# Patient Record
Sex: MALE | Race: WHITE | NOT HISPANIC OR LATINO | Employment: FULL TIME | URBAN - METROPOLITAN AREA
[De-identification: names, ages, dates, MRNs, and addresses within clinical notes are randomized per-mention and may not be internally consistent; named-entity substitution may affect disease eponyms.]

---

## 2017-04-13 ENCOUNTER — OFFICE VISIT (OUTPATIENT)
Dept: INTERNAL MEDICINE | Facility: CLINIC | Age: 37
End: 2017-04-13
Payer: COMMERCIAL

## 2017-04-13 VITALS
WEIGHT: 239.88 LBS | BODY MASS INDEX: 30.79 KG/M2 | HEART RATE: 95 BPM | HEIGHT: 74 IN | TEMPERATURE: 100 F | DIASTOLIC BLOOD PRESSURE: 70 MMHG | RESPIRATION RATE: 16 BRPM | SYSTOLIC BLOOD PRESSURE: 126 MMHG

## 2017-04-13 DIAGNOSIS — F41.1 GAD (GENERALIZED ANXIETY DISORDER): ICD-10-CM

## 2017-04-13 DIAGNOSIS — R41.840 CONCENTRATION DEFICIT: ICD-10-CM

## 2017-04-13 DIAGNOSIS — M51.36 BULGE OF LUMBAR DISC WITHOUT MYELOPATHY: ICD-10-CM

## 2017-04-13 DIAGNOSIS — Z79.4 TYPE 2 DIABETES MELLITUS WITHOUT COMPLICATION, WITH LONG-TERM CURRENT USE OF INSULIN: ICD-10-CM

## 2017-04-13 DIAGNOSIS — E11.9 TYPE 2 DIABETES MELLITUS WITHOUT COMPLICATION, WITH LONG-TERM CURRENT USE OF INSULIN: ICD-10-CM

## 2017-04-13 DIAGNOSIS — Z00.00 ANNUAL PHYSICAL EXAM: Primary | ICD-10-CM

## 2017-04-13 DIAGNOSIS — M25.861 PATELLOFEMORAL DYSFUNCTION OF RIGHT KNEE: ICD-10-CM

## 2017-04-13 PROCEDURE — 99999 PR PBB SHADOW E&M-NEW PATIENT-LVL III: CPT | Mod: PBBFAC,,, | Performed by: INTERNAL MEDICINE

## 2017-04-13 PROCEDURE — 99385 PREV VISIT NEW AGE 18-39: CPT | Mod: S$GLB,,, | Performed by: INTERNAL MEDICINE

## 2017-04-13 RX ORDER — METFORMIN HYDROCHLORIDE 500 MG/1
500 TABLET, EXTENDED RELEASE ORAL
COMMUNITY
End: 2019-03-19 | Stop reason: SDUPTHER

## 2017-04-13 RX ORDER — INSULIN GLARGINE 300 [IU]/ML
40 INJECTION, SOLUTION SUBCUTANEOUS NIGHTLY
COMMUNITY
End: 2017-10-23

## 2017-04-13 RX ORDER — INSULIN LISPRO 100 [IU]/ML
15 INJECTION, SOLUTION INTRAVENOUS; SUBCUTANEOUS
COMMUNITY
End: 2018-01-08 | Stop reason: DRUGHIGH

## 2017-04-13 RX ORDER — BUPROPION HYDROCHLORIDE 150 MG/1
150 TABLET ORAL DAILY
Qty: 30 TABLET | Refills: 11 | Status: SHIPPED | OUTPATIENT
Start: 2017-04-13 | End: 2017-10-23

## 2017-04-13 RX ORDER — BACLOFEN 20 MG/1
20 TABLET ORAL DAILY PRN
Qty: 30 TABLET | Refills: 11 | Status: SHIPPED | OUTPATIENT
Start: 2017-04-13 | End: 2019-02-11 | Stop reason: SDUPTHER

## 2017-04-13 RX ORDER — ETODOLAC 200 MG/1
200 CAPSULE ORAL DAILY PRN
Qty: 30 CAPSULE | Refills: 11 | Status: SHIPPED | OUTPATIENT
Start: 2017-04-13 | End: 2019-02-11 | Stop reason: SDUPTHER

## 2017-04-13 NOTE — PROGRESS NOTES
Subjective:       Patient ID: Chip Arora is a 36 y.o. male.    Chief Complaint: Establish Care    Patient is a 36 y.o.male who presents today for annual. He is a type 2 diabetes and every six months he visits with endocrine.       -Cholesterol: (due now)  -Vaccines: Influenza (done); Tetanus (due in three years); declines pneumonia vaccine for now  -Exercise: yes, walks dog for one hour 2-3 weeks; lots of stretching; push ups; weight lifting  -Diet: diabetic diet; low carb and low fat    MARIAELENA/ depression: affects blood sugar a lot; anxiety seems to be causing the depression. Mixture of social anxiety and regular anxiety. Mundane tasks seem daunting. Has taken zoloft and prozac in the past but it didn't work. Sees a counselor in the past. Trouble with concentration as well.    Back pain: flares intermittently; has taken baclofen and etodolac prn; needs refills    Past Medical History:   Diagnosis Date    Anxiety     Bulge of lumbar disc without myelopathy     Depression     Diabetes mellitus, type 2     sees endocrine; Dr. Hernandez at [facility]; states due to anthrax inoculation in the     Patellofemoral dysfunction     right    Rotator cuff disorder     right     Past Surgical History:   Procedure Laterality Date    WISDOM TOOTH EXTRACTION       Social History     Social History    Marital status:      Spouse name: N/A    Number of children: N/A    Years of education: N/A     Occupational History    [employer]      Social History Main Topics    Smoking status: Never Smoker    Smokeless tobacco: Not on file    Alcohol use No    Drug use: No    Sexual activity: Not on file     Other Topics Concern    Not on file     Social History Narrative    ; wife is a RN at [employer]     Review of patient's allergies indicates:  Allergies not on file  Mr. Arora does not currently have medications on file.    Review of Systems   Constitutional: Negative for appetite change, chills, diaphoresis,  fatigue and fever.   HENT: Negative for congestion, dental problem, ear discharge, ear pain, hearing loss, postnasal drip, sinus pressure and sore throat.    Eyes: Negative for discharge, redness and itching.   Respiratory: Negative for chest tightness, shortness of breath and wheezing.    Cardiovascular: Negative for chest pain, palpitations and leg swelling.   Gastrointestinal: Negative for abdominal pain, constipation, diarrhea, nausea and vomiting.   Endocrine: Negative for cold intolerance and heat intolerance.   Genitourinary: Negative for difficulty urinating, frequency, hematuria and urgency.   Musculoskeletal: Positive for back pain. Negative for arthralgias, gait problem, myalgias and neck pain.   Skin: Negative for color change and rash.   Allergic/Immunologic: Negative for environmental allergies.   Neurological: Negative for dizziness, syncope and headaches.   Hematological: Negative for adenopathy.   Psychiatric/Behavioral: Positive for decreased concentration. Negative for behavioral problems and sleep disturbance. The patient is nervous/anxious.        Objective:      Physical Exam   Constitutional: He is oriented to person, place, and time. He appears well-developed and well-nourished. No distress.   HENT:   Head: Normocephalic and atraumatic.   Right Ear: Tympanic membrane and external ear normal.   Left Ear: Tympanic membrane and external ear normal.   Nose: Nose normal. No mucosal edema or rhinorrhea.   Mouth/Throat: Uvula is midline, oropharynx is clear and moist and mucous membranes are normal. No oropharyngeal exudate, posterior oropharyngeal edema, posterior oropharyngeal erythema or tonsillar abscesses.   Eyes: Conjunctivae and EOM are normal. Pupils are equal, round, and reactive to light. Right eye exhibits no discharge. Left eye exhibits no discharge. No scleral icterus.   Neck: Normal range of motion. Neck supple. No JVD present. No tracheal deviation present. No thyromegaly present.    Cardiovascular: Normal rate, regular rhythm, normal heart sounds and intact distal pulses.  Exam reveals no gallop and no friction rub.    No murmur heard.  Pulmonary/Chest: Effort normal and breath sounds normal. No stridor. No respiratory distress. He has no wheezes. He has no rales. He exhibits no tenderness.   Abdominal: Soft. Bowel sounds are normal. He exhibits no distension. There is no tenderness. There is no rebound.   Musculoskeletal: Normal range of motion. He exhibits no edema or tenderness.   Lymphadenopathy:     He has no cervical adenopathy.   Neurological: He is alert and oriented to person, place, and time. He has normal reflexes.   Skin: Skin is warm. No rash noted. He is not diaphoretic. No erythema.   Psychiatric: He has a normal mood and affect. His behavior is normal.   Nursing note and vitals reviewed.      Assessment and Plan:       1. Annual physical exam  - CBC auto differential; Future  - Comprehensive metabolic panel; Future  - TSH; Future  - Lipid panel; Future  - Vitamin D; Future  - Urinalysis; Future  - Hemoglobin A1c; Future  - Microalbumin/creatinine urine ratio; Future    2. MARIAELENA (generalized anxiety disorder)  - buPROPion (WELLBUTRIN XL) 150 MG TB24 tablet; Take 1 tablet (150 mg total) by mouth once daily.  Dispense: 30 tablet; Refill: 11    3. Concentration deficit  - buPROPion (WELLBUTRIN XL) 150 MG TB24 tablet; Take 1 tablet (150 mg total) by mouth once daily.  Dispense: 30 tablet; Refill: 11    4. Type 2 diabetes mellitus without complication, with long-term current use of insulin  - follows with endocrine; pt states that his last A1C was 17    5. Bulge of lumbar disc without myelopathy  - baclofen and etodolac prn    6. Patellofemoral dysfunction of right knee  - baclofen and etodolac prn        No Follow-up on file.

## 2017-04-17 ENCOUNTER — LAB VISIT (OUTPATIENT)
Dept: LAB | Facility: HOSPITAL | Age: 37
End: 2017-04-17
Attending: INTERNAL MEDICINE
Payer: COMMERCIAL

## 2017-04-17 DIAGNOSIS — Z00.00 ANNUAL PHYSICAL EXAM: ICD-10-CM

## 2017-04-17 LAB
25(OH)D3+25(OH)D2 SERPL-MCNC: 26 NG/ML
ALBUMIN SERPL BCP-MCNC: 3.9 G/DL
ALP SERPL-CCNC: 96 U/L
ALT SERPL W/O P-5'-P-CCNC: 13 U/L
ANION GAP SERPL CALC-SCNC: 11 MMOL/L
AST SERPL-CCNC: 16 U/L
BASOPHILS # BLD AUTO: 0.04 K/UL
BASOPHILS NFR BLD: 0.4 %
BILIRUB SERPL-MCNC: 0.4 MG/DL
BUN SERPL-MCNC: 16 MG/DL
CALCIUM SERPL-MCNC: 9.6 MG/DL
CHLORIDE SERPL-SCNC: 99 MMOL/L
CHOLEST/HDLC SERPL: 6.3 {RATIO}
CO2 SERPL-SCNC: 27 MMOL/L
CREAT SERPL-MCNC: 0.9 MG/DL
DIFFERENTIAL METHOD: ABNORMAL
EOSINOPHIL # BLD AUTO: 0.7 K/UL
EOSINOPHIL NFR BLD: 7.3 %
ERYTHROCYTE [DISTWIDTH] IN BLOOD BY AUTOMATED COUNT: 12.9 %
EST. GFR  (AFRICAN AMERICAN): >60 ML/MIN/1.73 M^2
EST. GFR  (NON AFRICAN AMERICAN): >60 ML/MIN/1.73 M^2
GLUCOSE SERPL-MCNC: 255 MG/DL
HCT VFR BLD AUTO: 41.7 %
HDL/CHOLESTEROL RATIO: 16 %
HDLC SERPL-MCNC: 219 MG/DL
HDLC SERPL-MCNC: 35 MG/DL
HGB BLD-MCNC: 14.6 G/DL
LDLC SERPL CALC-MCNC: 151.4 MG/DL
LYMPHOCYTES # BLD AUTO: 3 K/UL
LYMPHOCYTES NFR BLD: 33.4 %
MCH RBC QN AUTO: 27.9 PG
MCHC RBC AUTO-ENTMCNC: 35 %
MCV RBC AUTO: 80 FL
MONOCYTES # BLD AUTO: 0.8 K/UL
MONOCYTES NFR BLD: 8.8 %
NEUTROPHILS # BLD AUTO: 4.5 K/UL
NEUTROPHILS NFR BLD: 49.8 %
NONHDLC SERPL-MCNC: 184 MG/DL
PLATELET # BLD AUTO: 248 K/UL
PMV BLD AUTO: 11 FL
POTASSIUM SERPL-SCNC: 5.3 MMOL/L
PROT SERPL-MCNC: 7.3 G/DL
RBC # BLD AUTO: 5.24 M/UL
SODIUM SERPL-SCNC: 137 MMOL/L
TRIGL SERPL-MCNC: 163 MG/DL
TSH SERPL DL<=0.005 MIU/L-ACNC: 3.26 UIU/ML
WBC # BLD AUTO: 9.06 K/UL

## 2017-04-17 PROCEDURE — 36415 COLL VENOUS BLD VENIPUNCTURE: CPT | Mod: PO

## 2017-04-17 PROCEDURE — 80061 LIPID PANEL: CPT

## 2017-04-17 PROCEDURE — 85025 COMPLETE CBC W/AUTO DIFF WBC: CPT

## 2017-04-17 PROCEDURE — 83036 HEMOGLOBIN GLYCOSYLATED A1C: CPT

## 2017-04-17 PROCEDURE — 82306 VITAMIN D 25 HYDROXY: CPT

## 2017-04-17 PROCEDURE — 84443 ASSAY THYROID STIM HORMONE: CPT

## 2017-04-17 PROCEDURE — 80053 COMPREHEN METABOLIC PANEL: CPT

## 2017-04-18 ENCOUNTER — TELEPHONE (OUTPATIENT)
Dept: INTERNAL MEDICINE | Facility: CLINIC | Age: 37
End: 2017-04-18

## 2017-04-18 DIAGNOSIS — E78.5 HYPERLIPIDEMIA, UNSPECIFIED HYPERLIPIDEMIA TYPE: Primary | ICD-10-CM

## 2017-04-18 LAB
ESTIMATED AVG GLUCOSE: 272 MG/DL
HBA1C MFR BLD HPLC: 11.1 %

## 2017-04-18 NOTE — TELEPHONE ENCOUNTER
Please inform patient of the following:    -Blood counts are normal; no signs of anemia  -Electrolytes, kidney and liver function are normal  - fasting glucose was 255  -Thyroid function is normal  -Cholesterol panel is elevated. Bad cholesterol is 151; our goal is less than 70. Triglycerides are 163; goal is less than 150. Recommend starting low fat and low cholesterol diet. Will do this for three months then repeat levels; if no improvement will need to start a medication. Recommend taking fish oil 2 grams daily to help out as well.  -Vit D is low; recommend starting vit D 1000 units daily otc  - diabetic marker is 11.1; f/u with endocrine  - urine reveals protein in urine; I do recommend starting a low dose medication to protect his kidney function; it is a blood pressure medication called lisinopril but it will be a low dosage to purely only protect his kidney. He can discuss with his endocrine if he prefers

## 2017-04-21 NOTE — TELEPHONE ENCOUNTER
Spoke to pt and informed of lab results, pt verbalized understanding. SAV message sent to pt with lab results, mailed diets to pt. Pt wants to hold off on lisinopril for now. Dr. Beatty notified.

## 2017-06-13 ENCOUNTER — TELEPHONE (OUTPATIENT)
Dept: INTERNAL MEDICINE | Facility: CLINIC | Age: 37
End: 2017-06-13

## 2017-06-13 DIAGNOSIS — M79.672 LEFT FOOT PAIN: Primary | ICD-10-CM

## 2017-06-15 ENCOUNTER — OFFICE VISIT (OUTPATIENT)
Dept: PODIATRY | Facility: CLINIC | Age: 37
End: 2017-06-15
Payer: COMMERCIAL

## 2017-06-15 VITALS
BODY MASS INDEX: 30.67 KG/M2 | HEART RATE: 87 BPM | HEIGHT: 74 IN | SYSTOLIC BLOOD PRESSURE: 163 MMHG | WEIGHT: 239 LBS | DIASTOLIC BLOOD PRESSURE: 95 MMHG

## 2017-06-15 DIAGNOSIS — M72.2 PLANTAR FASCIAL FIBROMATOSIS OF LEFT FOOT: ICD-10-CM

## 2017-06-15 DIAGNOSIS — E11.9 TYPE 2 DIABETES MELLITUS WITHOUT COMPLICATION, WITH LONG-TERM CURRENT USE OF INSULIN: ICD-10-CM

## 2017-06-15 DIAGNOSIS — M79.672 LEFT FOOT PAIN: Primary | ICD-10-CM

## 2017-06-15 DIAGNOSIS — Z79.4 TYPE 2 DIABETES MELLITUS WITHOUT COMPLICATION, WITH LONG-TERM CURRENT USE OF INSULIN: ICD-10-CM

## 2017-06-15 DIAGNOSIS — E11.9 ENCOUNTER FOR DIABETIC FOOT EXAM: ICD-10-CM

## 2017-06-15 PROCEDURE — 99204 OFFICE O/P NEW MOD 45 MIN: CPT | Mod: S$GLB,,, | Performed by: PODIATRIST

## 2017-06-15 PROCEDURE — 99999 PR PBB SHADOW E&M-EST. PATIENT-LVL III: CPT | Mod: PBBFAC,,, | Performed by: PODIATRIST

## 2017-06-15 PROCEDURE — 3046F HEMOGLOBIN A1C LEVEL >9.0%: CPT | Mod: S$GLB,,, | Performed by: PODIATRIST

## 2017-06-15 NOTE — LETTER
Kimberli 15, 2017      Yessenia Albert DO  [address] [facility] [id number]           [facility]  [address] [facility] [address]  Phone: [phone]          Patient: Chip Arora   MR Number: [id number]   YOB: 1980   Date of Visit: 6/15/2017       Dear Dr. Yessenia Albert:    Thank you for referring Chip Arora to me for evaluation. Attached you will find relevant portions of my assessment and plan of care.    If you have questions, please do not hesitate to call me. I look forward to following Chip Arora along with you.    Sincerely,    Debbie Augustin, ARIE    Enclosure  CC:  No Recipients    If you would like to receive this communication electronically, please contact [email or web address] or [phone] to request more information on [employer] Link access.    For providers and/or their staff who would like to refer a patient to [facility], please contact us through our one-stop-shop provider referral line, [facility], at [phone].    If you feel you have received this communication in error or would no longer like to receive these types of communications, please e-mail [email or web address]

## 2017-06-15 NOTE — PATIENT INSTRUCTIONS
Hemoglobin A1C   Date Value Ref Range Status   04/17/2017 11.1 (H) 4.5 - 6.2 % Final     Comment:     According to ADA guidelines, hemoglobin A1C <7.0% represents  optimal control in non-pregnant diabetic patients.  Different  metrics may apply to specific populations.   Standards of Medical Care in Diabetes - 2016.  For the purpose of screening for the presence of diabetes:  <5.7%     Consistent with the absence of diabetes  5.7-6.4%  Consistent with increasing risk for diabetes   (prediabetes)  >or=6.5%  Consistent with diabetes  Currently no consensus exists for use of hemoglobin A1C  for diagnosis of diabetes for children.         How to Check Your Feet    Below are tips to help you look for foot problems. Try to check your feet at the same time each day, such as when you get out of bed in the morning.    · Check the top of each foot. The tops of toes, back of the heel, and outer edge of the foot can get a lot of rubbing from poor-fitting shoes.    · Check the bottom of each foot. Daily wear and tear often leads to problems at pressure spots.    · Check the toes and nails. Fungal infections often occur between toes. Toenail problems can also be a sign of fungal infections or lead to breaks in the skin.    · Check your shoes, too. Loose objects inside a shoe can injure the foot. Use your hand to feel inside your shoes for things like concepción, loose stitching, or rough areas that could irritate your skin.        Diabetic Foot Care    Diabetes can lead to a number of different foot complications. Fortunately, most of these complications can be prevented with a little extra foot care. If diabetes is not well controlled, the high blood sugar can cause damage to blood vessels and result in poor circulation to the foot. When the skin does not get enough blood flow, it becomes prone to pressure sores and ulcers, which heal slowly.  High blood sugar can also damage nerves, interfering with the ability to feel pain and  pressure. When you cant feel your foot normally, it is easy to injure your skin, bones and joints without knowing it. For these reasons diabetes increases the risk of fungal infections, bunions and ulcers. Deep ulcers can lead to bone infection. Gangrene is the most serious foot complication of diabetes. It usually occurs on the tips of the toes as blacked areas of skin. The black area is dead tissue. In severe cases, gangrene spreads to involve the entire toe, other toes and the entire foot. Foot or toe amputation may be required. Good foot care and blood sugar control can prevent this.    Home Care  1. Wear comfortable, proper fitting shoes.  2. Wash your feet daily with warm water and mild soap.  3. After drying, apply a moisturizing cream or lotion.  4. Check your feet daily for skin breaks, blisters, swelling, or redness. Look between your toes also.  5. Wear cotton socks and change them every day.  6. Trim toe nails carefully and do not cut your cuticles.  7. Strive to keep your blood sugar under control with a combination of medicines, diet and activity.  8. If you smoke and have diabetes, it is very important that you stop. Smoking reduces blood flow to your foot.  9. Avoid activities that increase your risk of foot injury:  · Do not walk barefoot.  · Do not use heating pads or hot water bottles on your feet.  · Do not put your foot in a hot tub without first checking the temperature with your hand.  10) Schedule yearly foot exams.    Follow Up  with your doctor or as advised by our staff. Report any cut, puncture, scrape, other injury, blister, ingrown toenail or ulcer on your foot.    Get Prompt Medical Attention  if any of the following occur:  -- Open ulcer with pus draining from the wound  -- Increasing foot or leg pain  -- New areas of redness or swelling or tender areas of the foot    © 9907-7181 [employer]. [address] [place] [address], [place] [address]. All rights reserved. This  information is not intended as a substitute for professional medical care. Always follow your healthcare professional's instructions.      Plantar Fibroma and Plantar Fibromatosis    What is it?    A plantar fibroma is a benign nodule that grows on the bottom of the foot that usually appears in the second through sixth decade of life. It is usually slow growing and measures less than an inch in size. More invasive, rapid-growing and multiplanar fibromas are considered plantar fibromatosis. Both of them are benign tumors made up of  cells found in ligaments, or fibrocytes.      Symptoms and Clinical Presentation    Symptoms consist of a painful mass on the bottom of the foot, roughly in the middle of the arch or instep, between the heel pad and the forefoot pad. The mass will cause a soft convexity in the contour of the bottom of the foot that may be painful with pressure or shoewear.       Cause (including risk factors)     The cause is unknown but thought to have a genetic component. Trauma to the foot does not seem to be a factor.      Anatomy    Plantar fibromas reside in the deep fascia of the foot between the skin and the first (superficial) layers of muscle. The more aggressive condition of plantar fibromatosis may involve the skin and the muscle layers and may also wrap around the local digital nerves and arteries.       Diagnosis     There are a few conditions that can cause soft-tissue masses in the foot, including cysts, swollen tendons or tendon ruptures, nerve tumors (neurilemomas) or fat tumors. Foreign body reactions from previous penetrating trauma can also cause a mass in the bottom of the foot, as can an infection. A more serious synovial cell sarcoma, a malignancy, will usually show calcification on X-ray and a more worrisome appearance on MRI. Clinical exam, X-ray and sometimes an MRI may be needed for diganosis. Biopsy is usually not needed.       Treatment Options     Asymptomatic fibromas may  be observed. Painful fibromas may be treated with an off-loading insole or pad. Surgery is done for symptomatic fibromas when conservative treatment fails to give adequate pain relief. The recurrence rate is low for fibromas and significantly higher for plantar fibromatosis and in revision cases. Risks of surgery include wound complications; injury to local structures such as the digital nerves; and recurrence.      Recovery     Recovery may be hastened by elevation of the foot and diligent control of swelling to help prevent blood clot formation and delayed wound healing. Return to unrestricted activity and shoewear is in the one- to two-month range.   Outcome     Recurrence is rare for fibromas but more common in multiple lesions or if invasive lesions are encountered.      Complications     Potential complications include wound drainage or infection, a healed but painful wound, the return of a mass, and chronic neuritic pain, especially for an invasive lesion or in revision surgery.      Frequently Asked Questions     ?How did I get a plantar fibroma?   You inherited it but we dont know on which gene.      How long should I take off work for surgery?  One to two weeks, if you can keep your foot elevated and stay on crutches, or longer if this is not possible.      What happens if I wait?  There is no harm in waiting.

## 2017-06-15 NOTE — PROGRESS NOTES
CC:     Foot exam       HPI:   The patient is a 37 y.o.  male  who presents for a diabetic foot exam.     Patient reports no presence of abnormal sensation to the feet .    History of diabetic foot ulcers: none   History of foot surgery: none.     Shoes worn today:  Casual lace up shoes.   He is concerned about a bump on the bottom of his left foot, approximately one week.  No trauma to the area recalled.  There is tenderness with direct palpation. Doesn't hurt to much to walk on it because the bump is in the arch of the foot.      Primary care doctor is: Yessenia Albert DO  Patient last saw primary care doctor on: 4/13/17        Past Medical History:   Diagnosis Date    Anxiety     Bulge of lumbar disc without myelopathy     Depression     Diabetes mellitus, type 2     sees endocrine; Dr. Hernandez at [facility]; states due to anthrax inoculation in the     Patellofemoral dysfunction     right    Rotator cuff disorder     right         Current Outpatient Prescriptions on File Prior to Visit   Medication Sig Dispense Refill    baclofen (LIORESAL) 20 MG tablet Take 1 tablet (20 mg total) by mouth daily as needed (muscle spasm). 30 tablet 11    buPROPion (WELLBUTRIN XL) 150 MG TB24 tablet Take 1 tablet (150 mg total) by mouth once daily. 30 tablet 11    etodolac (LODINE) 200 MG Cap Take 1 capsule (200 mg total) by mouth daily as needed (pain). 30 capsule 11    insulin glargine, TOUJEO, (TOUJEO) 300 unit/mL (1.5 mL) InPn pen Inject 40 Units into the skin every evening.      insulin lispro (HUMALOG) 100 unit/mL injection Inject 15 Units into the skin 3 (three) times daily before meals.      metformin (GLUCOPHAGE XR) 500 MG 24 hr tablet Take 500 mg by mouth daily with breakfast.       No current facility-administered medications on file prior to visit.          Review of patient's allergies indicates:  No Known Allergies          ROS:  General ROS: negative  Respiratory ROS: no cough, shortness of breath, or  "wheezing  Cardiovascular ROS: no chest pain or dyspnea on exertion  Musculoskeletal ROS: negative  Neurological ROS:   negative for - impaired coordination/balance or numbness/tingling  Dermatological ROS: negative      LAST HbA1c:   Hemoglobin A1C   Date Value Ref Range Status   04/17/2017 11.1 (H) 4.5 - 6.2 % Final     Comment:     According to ADA guidelines, hemoglobin A1C <7.0% represents  optimal control in non-pregnant diabetic patients.  Different  metrics may apply to specific populations.   Standards of Medical Care in Diabetes - 2016.  For the purpose of screening for the presence of diabetes:  <5.7%     Consistent with the absence of diabetes  5.7-6.4%  Consistent with increasing risk for diabetes   (prediabetes)  >or=6.5%  Consistent with diabetes  Currently no consensus exists for use of hemoglobin A1C  for diagnosis of diabetes for children.             EXAM:   Vitals:    06/15/17 1000   BP: (!) 163/95   Pulse: 87   Weight: 108.4 kg (239 lb)   Height: 6' 2" (1.88 m)       General: alert, no distress, cooperative    Vascular:   Dorsalis pedis:   2+ bilateral.   Posterior Tibial:   2+ bilateral.   3 seconds capillary refill time   Temperature of toes are warm to touch.   normal hair growth on the feet.    Edema on feet:   none   Varicosities:  Hemosiderin deposits    Dermatological:    Skin: thin,  chronic bilateral dorsal red scaly plaque, no pruritis.  Nails: toenails 1-5 L and 1-5 R  are normal nails without lesions  Callus:  None  Open Wounds: None  Ecchymoses is not observed.      Erythema:  none .     Interdigital spaces: clean, dry and without evidence of break in skin integrity      Neurological:    normal light touch sensation and normal position sensation  [place] normal      Musculoskeletal:     Muscle strength: 5/5, adequate ROM, adequate strength     Right foot:  no gross deformity   Left foot: no gross deformity; marble-size palpable firm mass in the medial arch without overlying skin " changes.  No hyperemia to the area.  It is tender with direct palpation.  No other masses on the feet.       Hemoglobin A1C   Date Value Ref Range Status   04/17/2017 11.1 (H) 4.5 - 6.2 % Final     Comment:     According to ADA guidelines, hemoglobin A1C <7.0% represents  optimal control in non-pregnant diabetic patients.  Different  metrics may apply to specific populations.   Standards of Medical Care in Diabetes - 2016.  For the purpose of screening for the presence of diabetes:  <5.7%     Consistent with the absence of diabetes  5.7-6.4%  Consistent with increasing risk for diabetes   (prediabetes)  >or=6.5%  Consistent with diabetes  Currently no consensus exists for use of hemoglobin A1C  for diagnosis of diabetes for children.                ASSESSMENT/PLAN:      I counseled the patient on his conditions, their implications and medical management.       Encounter for diabetic foot exam  (Type 2 diabetes mellitus without complication, with long-term current use of insulin)  · Shoe inspection. Diabetic Foot Education. Patient reminded of the importance of good nutrition and blood sugar control to help prevent podiatric complications of diabetes. Patient instructed on proper foot hygiene. We discussed wearing proper shoe gear, daily foot inspections, never walking without protective shoe gear, never putting sharp instruments to feet.    Left foot pain 2/2 Plantar fascial fibromatosis of left foot  · Reassurance.  Monitor the area.  May consider MRI if worsening signs and symptoms.       Return in about 1 year (around 6/15/2018).

## 2017-08-09 ENCOUNTER — LAB VISIT (OUTPATIENT)
Dept: LAB | Facility: HOSPITAL | Age: 37
End: 2017-08-09
Attending: INTERNAL MEDICINE
Payer: COMMERCIAL

## 2017-08-09 ENCOUNTER — TELEPHONE (OUTPATIENT)
Dept: INTERNAL MEDICINE | Facility: CLINIC | Age: 37
End: 2017-08-09

## 2017-08-09 DIAGNOSIS — N28.9 UNSPECIFIED DISORDER OF KIDNEY AND URETER: ICD-10-CM

## 2017-08-09 LAB
25(OH)D3+25(OH)D2 SERPL-MCNC: 86 NG/ML
ANION GAP SERPL CALC-SCNC: 11 MMOL/L
BUN SERPL-MCNC: 17 MG/DL
CALCIUM SERPL-MCNC: 9.8 MG/DL
CHLORIDE SERPL-SCNC: 101 MMOL/L
CO2 SERPL-SCNC: 28 MMOL/L
CREAT SERPL-MCNC: 1.1 MG/DL
EST. GFR  (AFRICAN AMERICAN): >60 ML/MIN/1.73 M^2
EST. GFR  (NON AFRICAN AMERICAN): >60 ML/MIN/1.73 M^2
GLUCOSE SERPL-MCNC: 212 MG/DL
POTASSIUM SERPL-SCNC: 4.5 MMOL/L
SODIUM SERPL-SCNC: 140 MMOL/L
VIT B12 SERPL-MCNC: 1100 PG/ML

## 2017-08-09 PROCEDURE — 82607 VITAMIN B-12: CPT

## 2017-08-09 PROCEDURE — 36415 COLL VENOUS BLD VENIPUNCTURE: CPT | Mod: PO

## 2017-08-09 PROCEDURE — 82306 VITAMIN D 25 HYDROXY: CPT

## 2017-08-09 PROCEDURE — 80048 BASIC METABOLIC PNL TOTAL CA: CPT

## 2017-08-09 PROCEDURE — 84681 ASSAY OF C-PEPTIDE: CPT

## 2017-08-09 NOTE — TELEPHONE ENCOUNTER
Pt walked in with outside lab orders, lab refusing to draw due to order to [facility]. Pt requesting Dr. Beatty to order if possible.

## 2017-08-10 LAB — C PEPTIDE SERPL-MCNC: 1.07 NG/ML

## 2017-10-20 ENCOUNTER — TELEPHONE (OUTPATIENT)
Dept: ORTHOPEDICS | Facility: CLINIC | Age: 37
End: 2017-10-20

## 2017-10-20 ENCOUNTER — TELEPHONE (OUTPATIENT)
Dept: INTERNAL MEDICINE | Facility: CLINIC | Age: 37
End: 2017-10-20

## 2017-10-20 DIAGNOSIS — S92.506A CLOSED NONDISPLACED FRACTURE OF PHALANX OF LESSER TOE, UNSPECIFIED LATERALITY, UNSPECIFIED PHALANX, INITIAL ENCOUNTER: Primary | ICD-10-CM

## 2017-10-20 NOTE — TELEPHONE ENCOUNTER
Spoke to pt who stated that he fractured pinky toe at joint. Pt was seen in [facility] clinic and did have x-ray done. Pt was advised to f/u with ortho within 2 days. Advised pt to bring x-ray results to apt. Please order referral and route back to me.

## 2017-10-20 NOTE — TELEPHONE ENCOUNTER
Ortho Telephone Triage Call [phone]  Spoke with pt to schedule Ortho appt per referral by Dr. Albert for closed nondisplaced fracture 5th toe unspecified laterality. Pt states that he needed to be seen today r/t work and has been seen elsewhere. Pt has no further questions/concerns.

## 2017-10-23 ENCOUNTER — OFFICE VISIT (OUTPATIENT)
Dept: INTERNAL MEDICINE | Facility: CLINIC | Age: 37
End: 2017-10-23
Payer: COMMERCIAL

## 2017-10-23 VITALS
TEMPERATURE: 98 F | WEIGHT: 245.81 LBS | BODY MASS INDEX: 31.56 KG/M2 | SYSTOLIC BLOOD PRESSURE: 140 MMHG | HEART RATE: 85 BPM | RESPIRATION RATE: 15 BRPM | DIASTOLIC BLOOD PRESSURE: 92 MMHG

## 2017-10-23 DIAGNOSIS — R41.840 CONCENTRATION DEFICIT: ICD-10-CM

## 2017-10-23 DIAGNOSIS — M94.0 ACUTE COSTOCHONDRITIS: Primary | ICD-10-CM

## 2017-10-23 DIAGNOSIS — F41.8 SITUATIONAL ANXIETY: ICD-10-CM

## 2017-10-23 DIAGNOSIS — E10.3393 MODERATE NONPROLIFERATIVE DIABETIC RETINOPATHY OF BOTH EYES WITHOUT MACULAR EDEMA ASSOCIATED WITH TYPE 1 DIABETES MELLITUS: ICD-10-CM

## 2017-10-23 PROCEDURE — 99214 OFFICE O/P EST MOD 30 MIN: CPT | Mod: S$GLB,,, | Performed by: INTERNAL MEDICINE

## 2017-10-23 PROCEDURE — 99999 PR PBB SHADOW E&M-EST. PATIENT-LVL III: CPT | Mod: PBBFAC,,, | Performed by: INTERNAL MEDICINE

## 2017-10-23 RX ORDER — LIDOCAINE 50 MG/G
1 PATCH TOPICAL DAILY
Qty: 30 PATCH | Refills: 0 | Status: SHIPPED | OUTPATIENT
Start: 2017-10-23 | End: 2018-01-08

## 2017-10-23 RX ORDER — BUSPIRONE HYDROCHLORIDE 7.5 MG/1
7.5 TABLET ORAL 3 TIMES DAILY PRN
Qty: 90 TABLET | Refills: 11 | Status: SHIPPED | OUTPATIENT
Start: 2017-10-23 | End: 2018-06-26 | Stop reason: SDUPTHER

## 2017-10-23 RX ORDER — BUPROPION HYDROCHLORIDE 300 MG/1
300 TABLET ORAL DAILY
Qty: 30 TABLET | Refills: 11 | Status: SHIPPED | OUTPATIENT
Start: 2017-10-23 | End: 2018-04-20

## 2017-10-23 NOTE — PROGRESS NOTES
"Subjective:       Patient ID: Chip Arora is a 37 y.o. male.    Chief Complaint: Anxiety and Flank Pain (left "topical not an organ")    Patient is a 37 y.o.male who presents today for follow up.      1. Left sided rib/ flank pain. Thinks it is muscular. Affecting his sleep. Cannot lay on his left side due to this. Ongoing for one month. Also hurts when taking a deep breath. He has been taking etodolac and baclofen.     2. MARIAELENA: anxiety is much worse. Taking wellbutrin daily. He is having trouble focusing.     Review of Systems   Constitutional: Positive for unexpected weight change. Negative for activity change, appetite change, chills, diaphoresis, fatigue and fever.   HENT: Negative for congestion, dental problem, ear discharge, ear pain, hearing loss, postnasal drip, rhinorrhea, sinus pressure, sore throat and trouble swallowing.    Eyes: Positive for visual disturbance. Negative for discharge, redness and itching.   Respiratory: Positive for chest tightness. Negative for shortness of breath and wheezing.    Cardiovascular: Positive for chest pain. Negative for palpitations and leg swelling.   Gastrointestinal: Positive for constipation. Negative for abdominal pain, blood in stool, diarrhea, nausea and vomiting.   Endocrine: Negative for cold intolerance, heat intolerance, polydipsia and polyuria.   Genitourinary: Negative for difficulty urinating, frequency, hematuria and urgency.   Musculoskeletal: Negative for arthralgias, back pain, gait problem, joint swelling, myalgias and neck pain.   Skin: Negative for color change and rash.   Allergic/Immunologic: Negative for environmental allergies.   Neurological: Positive for headaches. Negative for dizziness, syncope and weakness.   Hematological: Negative for adenopathy.   Psychiatric/Behavioral: Positive for dysphoric mood. Negative for behavioral problems, confusion and sleep disturbance. The patient is not nervous/anxious.        Objective:      Physical Exam "   Constitutional: He is oriented to person, place, and time. He appears well-developed and well-nourished. No distress.   HENT:   Head: Normocephalic and atraumatic.   Right Ear: External ear normal.   Left Ear: External ear normal.   Nose: Nose normal.   Mouth/Throat: Oropharynx is clear and moist. No oropharyngeal exudate.   Eyes: Conjunctivae and EOM are normal. Pupils are equal, round, and reactive to light. Right eye exhibits no discharge. Left eye exhibits no discharge. No scleral icterus.   Neck: Normal range of motion. Neck supple. No JVD present. No tracheal deviation present. No thyromegaly present.   Cardiovascular: Normal rate, regular rhythm, normal heart sounds and intact distal pulses.  Exam reveals no gallop and no friction rub.    No murmur heard.  Pulmonary/Chest: Effort normal and breath sounds normal. No stridor. No respiratory distress. He has no wheezes. He has no rales. He exhibits no tenderness.   Abdominal: Soft. Bowel sounds are normal. He exhibits no distension. There is no tenderness. There is no rebound.   Musculoskeletal: Normal range of motion. He exhibits no edema or tenderness.   Lymphadenopathy:     He has no cervical adenopathy.   Neurological: He is alert and oriented to person, place, and time. He has normal reflexes. No cranial nerve deficit.   Skin: Skin is warm and dry. No rash noted. He is not diaphoretic. No erythema.   Psychiatric: He has a normal mood and affect. His behavior is normal.   Nursing note and vitals reviewed.      Assessment and Plan:       1. Acute costochondritis  - trial of etodolac and baclofen daily x 1 week  - lidocaine (LIDODERM) 5 %; Place 1 patch onto the skin once daily. Remove & Discard patch within 12 hours or as directed by MD  Dispense: 30 patch; Refill: 0    2. Concentration deficit  - increase wellbutrin to 300 mg  - buPROPion (WELLBUTRIN XL) 300 MG 24 hr tablet; Take 1 tablet (300 mg total) by mouth once daily.  Dispense: 30 tablet; Refill:  11    3. Situational anxiety  - busPIRone (BUSPAR) 7.5 MG tablet; Take 1 tablet (7.5 mg total) by mouth 3 (three) times daily as needed (anxiety).  Dispense: 90 tablet; Refill: 11    4. Moderate nonproliferative diabetic retinopathy of both eyes without macular edema associated with type 1 diabetes mellitus  - seeing optho          No Follow-up on file.

## 2017-10-27 DIAGNOSIS — E11.9 TYPE 2 DIABETES MELLITUS WITHOUT COMPLICATION: ICD-10-CM

## 2017-11-21 ENCOUNTER — PATIENT MESSAGE (OUTPATIENT)
Dept: INTERNAL MEDICINE | Facility: CLINIC | Age: 37
End: 2017-11-21

## 2017-11-21 DIAGNOSIS — E11.8 CONTROLLED DIABETES MELLITUS TYPE 2 WITH COMPLICATIONS, UNSPECIFIED LONG TERM INSULIN USE STATUS: ICD-10-CM

## 2017-11-21 DIAGNOSIS — E55.9 VITAMIN D DEFICIENCY: Primary | ICD-10-CM

## 2017-11-22 ENCOUNTER — LAB VISIT (OUTPATIENT)
Dept: LAB | Facility: HOSPITAL | Age: 37
End: 2017-11-22
Attending: INTERNAL MEDICINE
Payer: COMMERCIAL

## 2017-11-22 ENCOUNTER — PATIENT MESSAGE (OUTPATIENT)
Dept: INTERNAL MEDICINE | Facility: CLINIC | Age: 37
End: 2017-11-22

## 2017-11-22 DIAGNOSIS — E11.9 TYPE 2 DIABETES MELLITUS WITHOUT COMPLICATION: ICD-10-CM

## 2017-11-22 DIAGNOSIS — E55.9 VITAMIN D DEFICIENCY: ICD-10-CM

## 2017-11-22 DIAGNOSIS — E11.8 CONTROLLED DIABETES MELLITUS TYPE 2 WITH COMPLICATIONS, UNSPECIFIED LONG TERM INSULIN USE STATUS: ICD-10-CM

## 2017-11-22 LAB
25(OH)D3+25(OH)D2 SERPL-MCNC: 29 NG/ML
ANION GAP SERPL CALC-SCNC: 8 MMOL/L
BUN SERPL-MCNC: 16 MG/DL
C PEPTIDE SERPL-MCNC: 0.19 NG/ML
CALCIUM SERPL-MCNC: 10.2 MG/DL
CHLORIDE SERPL-SCNC: 100 MMOL/L
CO2 SERPL-SCNC: 31 MMOL/L
CREAT SERPL-MCNC: 1 MG/DL
EST. GFR  (AFRICAN AMERICAN): >60 ML/MIN/1.73 M^2
EST. GFR  (NON AFRICAN AMERICAN): >60 ML/MIN/1.73 M^2
ESTIMATED AVG GLUCOSE: 226 MG/DL
GLUCOSE SERPL-MCNC: 143 MG/DL
HBA1C MFR BLD HPLC: 9.5 %
POTASSIUM SERPL-SCNC: 4.3 MMOL/L
SODIUM SERPL-SCNC: 139 MMOL/L
VIT B12 SERPL-MCNC: 1366 PG/ML

## 2017-11-22 PROCEDURE — 82306 VITAMIN D 25 HYDROXY: CPT

## 2017-11-22 PROCEDURE — 82607 VITAMIN B-12: CPT

## 2017-11-22 PROCEDURE — 80048 BASIC METABOLIC PNL TOTAL CA: CPT

## 2017-11-22 PROCEDURE — 36415 COLL VENOUS BLD VENIPUNCTURE: CPT | Mod: PO

## 2017-11-22 PROCEDURE — 84681 ASSAY OF C-PEPTIDE: CPT

## 2017-11-22 PROCEDURE — 83036 HEMOGLOBIN GLYCOSYLATED A1C: CPT

## 2017-12-05 ENCOUNTER — PATIENT MESSAGE (OUTPATIENT)
Dept: INTERNAL MEDICINE | Facility: CLINIC | Age: 37
End: 2017-12-05

## 2017-12-05 ENCOUNTER — HOSPITAL ENCOUNTER (OUTPATIENT)
Dept: RADIOLOGY | Facility: HOSPITAL | Age: 37
Discharge: HOME | End: 2017-12-05
Attending: INTERNAL MEDICINE
Payer: COMMERCIAL

## 2017-12-05 ENCOUNTER — OFFICE VISIT (OUTPATIENT)
Dept: INTERNAL MEDICINE | Facility: CLINIC | Age: 37
End: 2017-12-05
Payer: COMMERCIAL

## 2017-12-05 VITALS
RESPIRATION RATE: 18 BRPM | SYSTOLIC BLOOD PRESSURE: 166 MMHG | DIASTOLIC BLOOD PRESSURE: 98 MMHG | WEIGHT: 250.44 LBS | TEMPERATURE: 98 F | HEIGHT: 74 IN | HEART RATE: 86 BPM | BODY MASS INDEX: 32.14 KG/M2

## 2017-12-05 DIAGNOSIS — B96.89 ACUTE BACTERIAL BRONCHITIS: Primary | ICD-10-CM

## 2017-12-05 DIAGNOSIS — B96.89 ACUTE BACTERIAL BRONCHITIS: ICD-10-CM

## 2017-12-05 DIAGNOSIS — E11.9 TYPE 2 DIABETES MELLITUS WITHOUT COMPLICATION, WITH LONG-TERM CURRENT USE OF INSULIN: ICD-10-CM

## 2017-12-05 DIAGNOSIS — J20.8 ACUTE BACTERIAL BRONCHITIS: Primary | ICD-10-CM

## 2017-12-05 DIAGNOSIS — J20.8 ACUTE BACTERIAL BRONCHITIS: ICD-10-CM

## 2017-12-05 DIAGNOSIS — Z79.4 TYPE 2 DIABETES MELLITUS WITHOUT COMPLICATION, WITH LONG-TERM CURRENT USE OF INSULIN: ICD-10-CM

## 2017-12-05 PROCEDURE — 71020 XR CHEST PA AND LATERAL: CPT | Mod: TC,PO

## 2017-12-05 PROCEDURE — 99214 OFFICE O/P EST MOD 30 MIN: CPT | Mod: S$GLB,,, | Performed by: INTERNAL MEDICINE

## 2017-12-05 PROCEDURE — 71020 XR CHEST PA AND LATERAL: CPT | Mod: 26,,, | Performed by: RADIOLOGY

## 2017-12-05 PROCEDURE — 99999 PR PBB SHADOW E&M-EST. PATIENT-LVL III: CPT | Mod: PBBFAC,,,

## 2017-12-05 RX ORDER — BENZONATATE 200 MG/1
200 CAPSULE ORAL 3 TIMES DAILY PRN
Qty: 30 CAPSULE | Refills: 0 | Status: SHIPPED | OUTPATIENT
Start: 2017-12-05 | End: 2017-12-15

## 2017-12-05 RX ORDER — INSULIN DEGLUDEC 200 U/ML
INJECTION, SOLUTION SUBCUTANEOUS
Refills: 3 | COMMUNITY
Start: 2017-09-07 | End: 2018-11-15 | Stop reason: SDUPTHER

## 2017-12-05 RX ORDER — DOXYCYCLINE 100 MG/1
100 CAPSULE ORAL 2 TIMES DAILY
Qty: 14 CAPSULE | Refills: 0 | Status: SHIPPED | OUTPATIENT
Start: 2017-12-05 | End: 2017-12-12

## 2017-12-05 RX ORDER — ALBUTEROL SULFATE 90 UG/1
2 AEROSOL, METERED RESPIRATORY (INHALATION) EVERY 6 HOURS PRN
Qty: 18 G | Refills: 0 | Status: SHIPPED | OUTPATIENT
Start: 2017-12-05 | End: 2018-01-08

## 2017-12-05 NOTE — PROGRESS NOTES
Subjective:       Patient ID: Chip Arora is a 37 y.o. male.    Chief Complaint: Cough; Nasal Congestion; Chest Congestion; Fever; and Generalized Body Aches    HPI   Chip Arora is a 37 y.o. male w/ insulin-dependent DMII (follows [facility])  who presents to clinic today w/ a productive cough along w/ other upper respiratory symptoms. Started around 11/25 and symptoms have only worsened. Tried Tylenol, dextromethorphan, pseudoephedrine and guaifenesin w/o much improvement. Noticed green sputum w/ productive cough. Complains of low-grade fevers at home w/ malaise and severe congestion. No sick contacts.     Review of Systems   Constitutional: Positive for chills, fatigue and fever. Negative for activity change, appetite change and unexpected weight change.   HENT: Positive for congestion, dental problem, postnasal drip, rhinorrhea, sinus pain, sinus pressure and sneezing. Negative for ear discharge, ear pain, hearing loss and voice change.    Eyes: Negative for visual disturbance.   Respiratory: Positive for cough and chest tightness. Negative for shortness of breath and wheezing.    Cardiovascular: Negative for chest pain and leg swelling.   Gastrointestinal: Negative for abdominal pain, diarrhea and nausea.   Endocrine: Negative for cold intolerance and heat intolerance.   Genitourinary: Negative for dysuria and hematuria.   Musculoskeletal: Negative for arthralgias and myalgias.   Skin: Negative for pallor and rash.   Neurological: Positive for dizziness and light-headedness. Negative for headaches.   Psychiatric/Behavioral: Negative for dysphoric mood and sleep disturbance.       Objective:      Physical Exam   Constitutional: He is oriented to person, place, and time. He appears well-developed and well-nourished. No distress.   HENT:   Head: Normocephalic and atraumatic.   Right Ear: Hearing, tympanic membrane, external ear and ear canal normal.   Left Ear: Hearing, tympanic membrane, external ear and ear  canal normal.   Nose: Mucosal edema, rhinorrhea and sinus tenderness present. Right sinus exhibits maxillary sinus tenderness and frontal sinus tenderness. Left sinus exhibits maxillary sinus tenderness and frontal sinus tenderness.   Mouth/Throat: Mucous membranes are normal. Oropharyngeal exudate present.   Eyes: Conjunctivae and EOM are normal. Pupils are equal, round, and reactive to light.   Neck: Normal range of motion. Neck supple.   Cardiovascular: Normal rate, regular rhythm, normal heart sounds and intact distal pulses.    Pulmonary/Chest: Effort normal and breath sounds normal.   Abdominal: Soft. Bowel sounds are normal.   Musculoskeletal: Normal range of motion. He exhibits no edema.   Neurological: He is alert and oriented to person, place, and time.   Skin: Skin is warm and dry. Capillary refill takes less than 2 seconds.   Psychiatric: He has a normal mood and affect. His behavior is normal. Judgment and thought content normal.       Assessment:       1. Acute bacterial bronchitis    2. Type 2 diabetes mellitus without complication, with long-term current use of insulin        38 yo m w/ poorly controlled T2DM who present w/ both upper and lower respiratory symptoms that have been present for nearly 2 weeks, concerning for bacterial bronchitis.   Plan:   1) Obtain PA/lateral CXR w/ concerns for PNA, though presenting hx suggests unlikely. Prescribed 100 mg of doxycycline BID for 7 days along benzonatate and albuterol for symptom management. If symptoms don't improve or worsen patient and wife instructed to call clinic and/or proceed to the ED. Will call pt about CXR results.    Gareth Cruz DO PGY-III  [facility] Medicine Resident   Pager [phone]

## 2017-12-05 NOTE — PROGRESS NOTES
Patient seen and examined with resident and agree with assessment and plan.    1.  Bronchitis - CXR.  Doxycycline 100 mg bid x 7 days. Albuterol inhaler.  Tessalon permarlen.  2.  Diabetes - not well controlled.  Avoid abx.

## 2018-01-03 ENCOUNTER — PATIENT MESSAGE (OUTPATIENT)
Dept: INTERNAL MEDICINE | Facility: CLINIC | Age: 38
End: 2018-01-03

## 2018-01-08 ENCOUNTER — OFFICE VISIT (OUTPATIENT)
Dept: INTERNAL MEDICINE | Facility: CLINIC | Age: 38
End: 2018-01-08
Payer: COMMERCIAL

## 2018-01-08 ENCOUNTER — LAB VISIT (OUTPATIENT)
Dept: LAB | Facility: HOSPITAL | Age: 38
End: 2018-01-08
Attending: INTERNAL MEDICINE
Payer: COMMERCIAL

## 2018-01-08 VITALS
SYSTOLIC BLOOD PRESSURE: 136 MMHG | TEMPERATURE: 99 F | HEART RATE: 72 BPM | HEIGHT: 74 IN | RESPIRATION RATE: 18 BRPM | WEIGHT: 246.06 LBS | BODY MASS INDEX: 31.58 KG/M2 | DIASTOLIC BLOOD PRESSURE: 86 MMHG

## 2018-01-08 DIAGNOSIS — R00.2 HEART PALPITATIONS: ICD-10-CM

## 2018-01-08 DIAGNOSIS — R10.9 ABDOMINAL PAIN, UNSPECIFIED ABDOMINAL LOCATION: ICD-10-CM

## 2018-01-08 DIAGNOSIS — Z79.4 TYPE 2 DIABETES MELLITUS WITHOUT COMPLICATION, WITH LONG-TERM CURRENT USE OF INSULIN: Primary | ICD-10-CM

## 2018-01-08 DIAGNOSIS — E11.9 TYPE 2 DIABETES MELLITUS WITHOUT COMPLICATION, WITH LONG-TERM CURRENT USE OF INSULIN: Primary | ICD-10-CM

## 2018-01-08 LAB
ALBUMIN SERPL BCP-MCNC: 3.7 G/DL
ALP SERPL-CCNC: 137 U/L
ALT SERPL W/O P-5'-P-CCNC: 16 U/L
AMYLASE SERPL-CCNC: 33 U/L
ANION GAP SERPL CALC-SCNC: 8 MMOL/L
AST SERPL-CCNC: 11 U/L
BILIRUB SERPL-MCNC: 0.2 MG/DL
BUN SERPL-MCNC: 15 MG/DL
CALCIUM SERPL-MCNC: 9.6 MG/DL
CHLORIDE SERPL-SCNC: 102 MMOL/L
CO2 SERPL-SCNC: 29 MMOL/L
CREAT SERPL-MCNC: 0.9 MG/DL
EST. GFR  (AFRICAN AMERICAN): >60 ML/MIN/1.73 M^2
EST. GFR  (NON AFRICAN AMERICAN): >60 ML/MIN/1.73 M^2
GLUCOSE SERPL-MCNC: 280 MG/DL
LIPASE SERPL-CCNC: 23 U/L
POTASSIUM SERPL-SCNC: 5.1 MMOL/L
PROT SERPL-MCNC: 7.7 G/DL
SODIUM SERPL-SCNC: 139 MMOL/L

## 2018-01-08 PROCEDURE — 82150 ASSAY OF AMYLASE: CPT

## 2018-01-08 PROCEDURE — 93010 ELECTROCARDIOGRAM REPORT: CPT | Mod: S$GLB,,, | Performed by: INTERNAL MEDICINE

## 2018-01-08 PROCEDURE — 83690 ASSAY OF LIPASE: CPT

## 2018-01-08 PROCEDURE — 99214 OFFICE O/P EST MOD 30 MIN: CPT | Mod: S$GLB,,, | Performed by: INTERNAL MEDICINE

## 2018-01-08 PROCEDURE — 36415 COLL VENOUS BLD VENIPUNCTURE: CPT | Mod: PO

## 2018-01-08 PROCEDURE — 80053 COMPREHEN METABOLIC PANEL: CPT

## 2018-01-08 PROCEDURE — 99999 PR PBB SHADOW E&M-EST. PATIENT-LVL III: CPT | Mod: PBBFAC,,, | Performed by: INTERNAL MEDICINE

## 2018-01-08 PROCEDURE — 93005 ELECTROCARDIOGRAM TRACING: CPT | Mod: S$GLB,,, | Performed by: INTERNAL MEDICINE

## 2018-01-08 RX ORDER — INSULIN LISPRO 200 [IU]/ML
INJECTION, SOLUTION SUBCUTANEOUS
COMMUNITY
Start: 2017-12-31 | End: 2019-07-18 | Stop reason: SDUPTHER

## 2018-01-08 RX ORDER — ESOMEPRAZOLE MAGNESIUM 40 MG/1
40 CAPSULE, DELAYED RELEASE ORAL
COMMUNITY
Start: 2018-01-05

## 2018-01-08 NOTE — PROGRESS NOTES
Subjective:       Patient ID: Chip Arora is a 37 y.o. male.    Chief Complaint: Forms (FMLA); Palpitations; and Nausea (associated with LUQ abd pain, no pain today)    Patient is a 37 y.o.male who presents today for diabetes follow up. He needs FMLA paperwork forms completed.     Abdominal pain and nausea: occurs after eating; associated with diarrhea. Wife noticed sweating and color change in face. He had eaten meatloaf. He takes trulicity on Sunday evenings. Sharp pain in left upper quadrant pain with it. No pain currently but still nauseated.     Palpitations: occurs during intercourse; will then have ED afterwards. He notes heavy pounding, associated with pain. He has stopped the wellbutrin thinking that was the cause.     Review of Systems   Constitutional: Positive for fatigue. Negative for appetite change, chills, diaphoresis and fever.   HENT: Negative for congestion, dental problem, ear discharge, ear pain, hearing loss, postnasal drip, sinus pressure and sore throat.    Eyes: Negative for discharge, redness and itching.   Respiratory: Negative for chest tightness, shortness of breath and wheezing.    Cardiovascular: Positive for chest pain. Negative for palpitations and leg swelling.   Gastrointestinal: Negative for abdominal pain, constipation, diarrhea, nausea and vomiting.   Endocrine: Negative for cold intolerance, heat intolerance, polydipsia, polyphagia and polyuria.   Genitourinary: Negative for difficulty urinating, frequency, hematuria and urgency.   Musculoskeletal: Negative for arthralgias, back pain, gait problem, myalgias and neck pain.   Skin: Negative for color change, pallor and rash.   Allergic/Immunologic: Negative for environmental allergies.   Neurological: Negative for dizziness, tremors, seizures, syncope, speech difficulty, weakness and headaches.   Hematological: Negative for adenopathy.   Psychiatric/Behavioral: Negative for behavioral problems, confusion and sleep disturbance.  The patient is nervous/anxious.        Objective:      Physical Exam   Constitutional: He is oriented to person, place, and time. He appears well-developed and well-nourished. No distress.   HENT:   Head: Normocephalic and atraumatic.   Right Ear: External ear normal.   Left Ear: External ear normal.   Nose: Nose normal.   Mouth/Throat: Oropharynx is clear and moist. No oropharyngeal exudate.   Eyes: Conjunctivae and EOM are normal. Pupils are equal, round, and reactive to light. Right eye exhibits no discharge. Left eye exhibits no discharge. No scleral icterus.   Neck: Normal range of motion. Neck supple. No JVD present. No tracheal deviation present. No thyromegaly present.   Cardiovascular: Normal rate, regular rhythm, normal heart sounds and intact distal pulses.  Exam reveals no gallop and no friction rub.    No murmur heard.  Pulmonary/Chest: Effort normal and breath sounds normal. No stridor. No respiratory distress. He has no wheezes. He has no rales. He exhibits no tenderness.   Abdominal: Soft. Bowel sounds are normal. He exhibits no distension. There is no tenderness. There is no rebound.   Musculoskeletal: Normal range of motion. He exhibits no edema or tenderness.   Lymphadenopathy:     He has no cervical adenopathy.   Neurological: He is alert and oriented to person, place, and time. He has normal reflexes. No cranial nerve deficit.   Skin: Skin is warm and dry. No rash noted. He is not diaphoretic. No erythema.   Psychiatric: He has a normal mood and affect. His behavior is normal.   Nursing note and vitals reviewed.      Assessment and Plan:       1. Type 2 diabetes mellitus without complication, with long-term current use of insulin  - fmla form completed  - pt needs to eat within 4 hours of getting to work    2. Heart palpitations  - EKG 12-lead  - Holter monitor - 24 hour; Future    3. Abdominal pain, unspecified abdominal location  - CT Abdomen Without Contrast; Future  - Amylase; Future  -  Lipase; Future  - Comprehensive metabolic panel; Future          No Follow-up on file.

## 2018-01-09 ENCOUNTER — PATIENT MESSAGE (OUTPATIENT)
Dept: INTERNAL MEDICINE | Facility: CLINIC | Age: 38
End: 2018-01-09

## 2018-01-11 ENCOUNTER — PATIENT MESSAGE (OUTPATIENT)
Dept: INTERNAL MEDICINE | Facility: CLINIC | Age: 38
End: 2018-01-11

## 2018-01-13 ENCOUNTER — PATIENT MESSAGE (OUTPATIENT)
Dept: INTERNAL MEDICINE | Facility: CLINIC | Age: 38
End: 2018-01-13

## 2018-01-16 ENCOUNTER — CLINICAL SUPPORT (OUTPATIENT)
Dept: CARDIOLOGY | Facility: CLINIC | Age: 38
End: 2018-01-16
Attending: INTERNAL MEDICINE
Payer: COMMERCIAL

## 2018-01-16 DIAGNOSIS — R00.2 HEART PALPITATIONS: ICD-10-CM

## 2018-01-16 PROCEDURE — 93224 XTRNL ECG REC UP TO 48 HRS: CPT | Mod: S$GLB,,, | Performed by: INTERNAL MEDICINE

## 2018-01-24 ENCOUNTER — PATIENT MESSAGE (OUTPATIENT)
Dept: INTERNAL MEDICINE | Facility: CLINIC | Age: 38
End: 2018-01-24

## 2018-01-24 DIAGNOSIS — E11.9 TYPE 2 DIABETES MELLITUS WITHOUT COMPLICATION, UNSPECIFIED LONG TERM INSULIN USE STATUS: Primary | ICD-10-CM

## 2018-01-29 ENCOUNTER — PATIENT MESSAGE (OUTPATIENT)
Dept: INTERNAL MEDICINE | Facility: CLINIC | Age: 38
End: 2018-01-29

## 2018-01-31 ENCOUNTER — LAB VISIT (OUTPATIENT)
Dept: LAB | Facility: HOSPITAL | Age: 38
End: 2018-01-31
Attending: INTERNAL MEDICINE
Payer: COMMERCIAL

## 2018-01-31 ENCOUNTER — PATIENT MESSAGE (OUTPATIENT)
Dept: INTERNAL MEDICINE | Facility: CLINIC | Age: 38
End: 2018-01-31

## 2018-01-31 DIAGNOSIS — E11.9 TYPE 2 DIABETES MELLITUS WITHOUT COMPLICATION, UNSPECIFIED LONG TERM INSULIN USE STATUS: ICD-10-CM

## 2018-01-31 LAB
25(OH)D3+25(OH)D2 SERPL-MCNC: 17 NG/ML
ALBUMIN SERPL BCP-MCNC: 3.7 G/DL
ALP SERPL-CCNC: 158 U/L
ALT SERPL W/O P-5'-P-CCNC: 15 U/L
AMYLASE SERPL-CCNC: 35 U/L
ANION GAP SERPL CALC-SCNC: 10 MMOL/L
AST SERPL-CCNC: 13 U/L
BILIRUB SERPL-MCNC: 0.5 MG/DL
BUN SERPL-MCNC: 23 MG/DL
CALCIUM SERPL-MCNC: 9.6 MG/DL
CHLORIDE SERPL-SCNC: 99 MMOL/L
CHOLEST SERPL-MCNC: 208 MG/DL
CHOLEST/HDLC SERPL: 6.3 {RATIO}
CO2 SERPL-SCNC: 29 MMOL/L
CREAT SERPL-MCNC: 0.9 MG/DL
EST. GFR  (AFRICAN AMERICAN): >60 ML/MIN/1.73 M^2
EST. GFR  (NON AFRICAN AMERICAN): >60 ML/MIN/1.73 M^2
ESTIMATED AVG GLUCOSE: 206 MG/DL
GLUCOSE SERPL-MCNC: 212 MG/DL
HBA1C MFR BLD HPLC: 8.8 %
HDLC SERPL-MCNC: 33 MG/DL
HDLC SERPL: 15.9 %
LDLC SERPL CALC-MCNC: 136.6 MG/DL
LIPASE SERPL-CCNC: 19 U/L
NONHDLC SERPL-MCNC: 175 MG/DL
POTASSIUM SERPL-SCNC: 4.7 MMOL/L
PROT SERPL-MCNC: 7.7 G/DL
SODIUM SERPL-SCNC: 138 MMOL/L
TRIGL SERPL-MCNC: 192 MG/DL
TSH SERPL DL<=0.005 MIU/L-ACNC: 2.03 UIU/ML
VIT B12 SERPL-MCNC: 721 PG/ML

## 2018-01-31 PROCEDURE — 84443 ASSAY THYROID STIM HORMONE: CPT

## 2018-01-31 PROCEDURE — 80053 COMPREHEN METABOLIC PANEL: CPT

## 2018-01-31 PROCEDURE — 82150 ASSAY OF AMYLASE: CPT

## 2018-01-31 PROCEDURE — 82607 VITAMIN B-12: CPT

## 2018-01-31 PROCEDURE — 83036 HEMOGLOBIN GLYCOSYLATED A1C: CPT

## 2018-01-31 PROCEDURE — 83690 ASSAY OF LIPASE: CPT

## 2018-01-31 PROCEDURE — 80061 LIPID PANEL: CPT

## 2018-01-31 PROCEDURE — 82306 VITAMIN D 25 HYDROXY: CPT

## 2018-01-31 PROCEDURE — 36415 COLL VENOUS BLD VENIPUNCTURE: CPT | Mod: PO

## 2018-02-06 ENCOUNTER — PATIENT MESSAGE (OUTPATIENT)
Dept: INTERNAL MEDICINE | Facility: CLINIC | Age: 38
End: 2018-02-06

## 2018-03-26 ENCOUNTER — PATIENT MESSAGE (OUTPATIENT)
Dept: INTERNAL MEDICINE | Facility: CLINIC | Age: 38
End: 2018-03-26

## 2018-03-26 DIAGNOSIS — E11.8 CONTROLLED DIABETES MELLITUS TYPE 2 WITH COMPLICATIONS, UNSPECIFIED LONG TERM INSULIN USE STATUS: ICD-10-CM

## 2018-03-26 DIAGNOSIS — N52.9 ERECTILE DYSFUNCTION, UNSPECIFIED ERECTILE DYSFUNCTION TYPE: Primary | ICD-10-CM

## 2018-03-26 DIAGNOSIS — F41.1 GAD (GENERALIZED ANXIETY DISORDER): ICD-10-CM

## 2018-04-05 ENCOUNTER — PATIENT MESSAGE (OUTPATIENT)
Dept: INTERNAL MEDICINE | Facility: CLINIC | Age: 38
End: 2018-04-05

## 2018-04-11 ENCOUNTER — LAB VISIT (OUTPATIENT)
Dept: LAB | Facility: HOSPITAL | Age: 38
End: 2018-04-11
Attending: INTERNAL MEDICINE
Payer: COMMERCIAL

## 2018-04-11 DIAGNOSIS — E11.8 CONTROLLED DIABETES MELLITUS TYPE 2 WITH COMPLICATIONS, UNSPECIFIED LONG TERM INSULIN USE STATUS: ICD-10-CM

## 2018-04-11 LAB
ALBUMIN SERPL BCP-MCNC: 3.8 G/DL
ALP SERPL-CCNC: 132 U/L
ALT SERPL W/O P-5'-P-CCNC: 17 U/L
ANION GAP SERPL CALC-SCNC: 7 MMOL/L
AST SERPL-CCNC: 12 U/L
BILIRUB SERPL-MCNC: 0.5 MG/DL
BUN SERPL-MCNC: 20 MG/DL
CALCIUM SERPL-MCNC: 9.8 MG/DL
CHLORIDE SERPL-SCNC: 98 MMOL/L
CO2 SERPL-SCNC: 31 MMOL/L
CREAT SERPL-MCNC: 0.9 MG/DL
EST. GFR  (AFRICAN AMERICAN): >60 ML/MIN/1.73 M^2
EST. GFR  (NON AFRICAN AMERICAN): >60 ML/MIN/1.73 M^2
ESTIMATED AVG GLUCOSE: 237 MG/DL
GLUCOSE SERPL-MCNC: 217 MG/DL
HBA1C MFR BLD HPLC: 9.9 %
POTASSIUM SERPL-SCNC: 5.1 MMOL/L
PROT SERPL-MCNC: 7.4 G/DL
SODIUM SERPL-SCNC: 136 MMOL/L

## 2018-04-11 PROCEDURE — 36415 COLL VENOUS BLD VENIPUNCTURE: CPT | Mod: PO

## 2018-04-11 PROCEDURE — 83036 HEMOGLOBIN GLYCOSYLATED A1C: CPT

## 2018-04-11 PROCEDURE — 80053 COMPREHEN METABOLIC PANEL: CPT

## 2018-04-19 NOTE — PROGRESS NOTES
"Subjective:      Chip Arora is a 37 y.o. male who was referred by Yessenia Albert DO for evaluation of ED.      Erectile Dysfunction  Patient complains of erectile dysfunction. Onset of dysfunction was 5 months ago and was sudden in onset.  Patient states the nature of difficulty is maintaining erection. Full erections occur with intercourse. Partial erections occur never. Libido is possibly affected.  Patient denies history of cardiovascular disease. He was recently on wellbutrin for depression but stopped 2 months ago. Previous treatment of ED includes none.    His medical history is significant for diabetes (type 2) diagnosed in 2002. His most recent A1C is 9.9; he states until recently he has been > 10 and often around 14.     The following portions of the patient's history were reviewed and updated as appropriate: allergies, current medications, past family history, past medical history, past social history, past surgical history and problem list.    Review of Systems  Constitutional: no fever or chills  ENT: no nasal congestion or sore throat  Respiratory: no cough or shortness of breath  Cardiovascular: no chest pain or palpitations  Gastrointestinal: no nausea or vomiting, tolerating diet  Genitourinary: as per HPI  Hematologic/Lymphatic: no easy bruising or lymphadenopathy  Musculoskeletal: no arthralgias or myalgias  Neurological: no seizures or tremors  Behavioral/Psych: no auditory or visual hallucinations     Objective:   Vitals: BP (!) 141/90 (BP Location: Left arm, Patient Position: Sitting, BP Method: X-Large (Automatic))   Pulse 98   Ht 6' 2" (1.88 m)   Wt 111.6 kg (246 lb 0.5 oz)   BMI 31.59 kg/m²     Physical Exam   Physical Exam   Constitutional: He is oriented to person, place, and time. He appears well-developed and well-nourished. No distress.   HENT:   Head: Normocephalic and atraumatic.   Right Ear: External ear normal.   Left Ear: External ear normal.   Nose: Nose normal. "   Mouth/Throat: Oropharynx is clear and moist.   Eyes: Conjunctivae and EOM are normal. Right eye exhibits no discharge. Left eye exhibits no discharge. No scleral icterus.   Neck: Neck supple. No tracheal deviation present. No thyromegaly present.   Cardiovascular: Normal rate and regular rhythm.  PMI is not displaced.    Pulmonary/Chest: Effort normal. No respiratory distress. He exhibits no tenderness.   Abdominal: Soft. He exhibits no distension. There is no tenderness. There is no CVA tenderness. No hernia.   Genitourinary: Testes normal and penis normal. Right testis shows no mass, no swelling and no tenderness. Left testis shows no mass, no swelling and no tenderness. No hypospadias.   Musculoskeletal: He exhibits no edema.   Neurological: He is alert and oriented to person, place, and time.   Skin: Skin is warm and dry. No rash noted. No erythema.   Psychiatric: He has a normal mood and affect. His speech is normal and behavior is normal. Judgment and thought content normal. His mood appears not anxious. Cognition and memory are normal.      Lab Review     Lab Results   Component Value Date    WBC 9.06 04/17/2017    HGB 14.6 04/17/2017    HCT 41.7 04/17/2017    MCV 80 (L) 04/17/2017     04/17/2017     Lab Results   Component Value Date    CREATININE 0.9 04/11/2018    BUN 20 04/11/2018     Assessment:     1. Erectile dysfunction due to arterial insufficiency        Plan:   1. Explained that in young men ED is often psychogenic, which may be contributing some here. However, given his long history of DM with relatively poor control, I think this is more likely the most significant underlying etiology.  2. Recommend trial of generic sildenafil. Discussed dosing and timing.  3. FU 3 mos

## 2018-04-20 ENCOUNTER — OFFICE VISIT (OUTPATIENT)
Dept: UROLOGY | Facility: CLINIC | Age: 38
End: 2018-04-20
Payer: COMMERCIAL

## 2018-04-20 VITALS
BODY MASS INDEX: 31.58 KG/M2 | HEIGHT: 74 IN | HEART RATE: 98 BPM | DIASTOLIC BLOOD PRESSURE: 90 MMHG | SYSTOLIC BLOOD PRESSURE: 141 MMHG | WEIGHT: 246.06 LBS

## 2018-04-20 DIAGNOSIS — N52.01 ERECTILE DYSFUNCTION DUE TO ARTERIAL INSUFFICIENCY: Primary | ICD-10-CM

## 2018-04-20 PROCEDURE — 99244 OFF/OP CNSLTJ NEW/EST MOD 40: CPT | Mod: S$GLB,,, | Performed by: UROLOGY

## 2018-04-20 PROCEDURE — 99999 PR PBB SHADOW E&M-EST. PATIENT-LVL III: CPT | Mod: PBBFAC,,, | Performed by: UROLOGY

## 2018-04-20 RX ORDER — SILDENAFIL CITRATE 20 MG/1
20 TABLET ORAL SEE ADMIN INSTRUCTIONS
Qty: 100 TABLET | Refills: 11 | Status: SHIPPED | OUTPATIENT
Start: 2018-04-20 | End: 2019-05-23

## 2018-04-20 NOTE — LETTER
April 20, 2018      Yessenia Albert DO  [address] [place] [address]           [place] [facility]  [address] [facility] [address]  Phone: [phone]          Patient: Chip Arora   MR Number: [id number]   YOB: 1980   Date of Visit: 4/20/2018       Dear Dr. Yessenia Albert:    Thank you for referring Chip Arora to me for evaluation. Attached you will find relevant portions of my assessment and plan of care.    If you have questions, please do not hesitate to call me. I look forward to following Chip Arora along with you.    Sincerely,    Diego Pruett MD    Enclosure  CC:  No Recipients    If you would like to receive this communication electronically, please contact [email or web address] or [phone] to request more information on [facility] access.    For providers and/or their staff who would like to refer a patient to [facility], please contact us through our one-stop-shop provider referral line, [facility], at [phone].    If you feel you have received this communication in error or would no longer like to receive these types of communications, please e-mail [email or web address]

## 2018-06-26 ENCOUNTER — OFFICE VISIT (OUTPATIENT)
Dept: PSYCHIATRY | Facility: CLINIC | Age: 38
End: 2018-06-26
Payer: COMMERCIAL

## 2018-06-26 VITALS
DIASTOLIC BLOOD PRESSURE: 91 MMHG | HEART RATE: 81 BPM | HEIGHT: 74 IN | WEIGHT: 249 LBS | SYSTOLIC BLOOD PRESSURE: 161 MMHG | BODY MASS INDEX: 31.95 KG/M2

## 2018-06-26 DIAGNOSIS — F41.8 SITUATIONAL ANXIETY: ICD-10-CM

## 2018-06-26 DIAGNOSIS — F90.0 ATTENTION DEFICIT HYPERACTIVITY DISORDER (ADHD), PREDOMINANTLY INATTENTIVE TYPE: Primary | ICD-10-CM

## 2018-06-26 PROCEDURE — 99999 PR PBB SHADOW E&M-EST. PATIENT-LVL III: CPT | Mod: PBBFAC,,, | Performed by: NURSE PRACTITIONER

## 2018-06-26 PROCEDURE — 3008F BODY MASS INDEX DOCD: CPT | Mod: CPTII,S$GLB,, | Performed by: NURSE PRACTITIONER

## 2018-06-26 PROCEDURE — 99205 OFFICE O/P NEW HI 60 MIN: CPT | Mod: S$GLB,,, | Performed by: NURSE PRACTITIONER

## 2018-06-26 RX ORDER — DEXTROAMPHETAMINE SACCHARATE, AMPHETAMINE ASPARTATE, DEXTROAMPHETAMINE SULFATE AND AMPHETAMINE SULFATE 2.5; 2.5; 2.5; 2.5 MG/1; MG/1; MG/1; MG/1
10 TABLET ORAL 2 TIMES DAILY
Qty: 60 TABLET | Refills: 0 | Status: SHIPPED | OUTPATIENT
Start: 2018-06-26 | End: 2018-06-26 | Stop reason: SDUPTHER

## 2018-06-26 RX ORDER — BUSPIRONE HYDROCHLORIDE 7.5 MG/1
7.5 TABLET ORAL 3 TIMES DAILY PRN
Qty: 90 TABLET | Refills: 11 | Status: SHIPPED | OUTPATIENT
Start: 2018-06-26 | End: 2019-06-20 | Stop reason: SDUPTHER

## 2018-06-26 RX ORDER — DEXTROAMPHETAMINE SACCHARATE, AMPHETAMINE ASPARTATE, DEXTROAMPHETAMINE SULFATE AND AMPHETAMINE SULFATE 2.5; 2.5; 2.5; 2.5 MG/1; MG/1; MG/1; MG/1
10 TABLET ORAL 2 TIMES DAILY
Qty: 60 TABLET | Refills: 0 | Status: SHIPPED | OUTPATIENT
Start: 2018-07-26 | End: 2018-10-24 | Stop reason: SDUPTHER

## 2018-06-26 NOTE — PROGRESS NOTES
"Outpatient Psychiatry Initial Visit (MD/NP)    6/26/2018    Chip Arora, a 38 y.o. male, presenting for initial evaluation visit. Met with patient.    Reason for Encounter: Referral from Yessenia Albert DO. Patient complains of anxiety and attention problems.    History of Present Illness:    Pt is a 37 y/o male with self-reported hx of anxiety problems.  Never evaluated by psychiatry.  PCP ordered trials of buspar, Zoloft, and Wellbutrin with no reported benefit. Currently endorses chronic anxiety/mood symptoms of excessive worrying, muscle tension, irritability, mental fatigue, avolition, and socially withdrawn.  Denies SI/HI/AVH.  Thought processes are clear and organized; no delusional content.  No hx of campbell.  Sleep and appetite intact.   Pt endorses hx of attention problems starting in childhood but never treated.  Symptoms affect functioning at home and school.  Pt in [facility] program at [facility] and reports trouble with focus and attention.       ADHD Adult:  · Have difficulty sustaining attention in tasks or fun activities?  yes  · Don't follow through on instructions and fail to finish work?  yes  · Have difficulty organizing tasks and activities? yes  · Avoid, dislike, or are reluctant to engage in work thar requires sustained mental effort?  yes  · Easily distracted? yes  · Forgetful in daily activities? yes  · Fidget with hands or feet, or squirm in seat?  no  · Have difficulty engaging in leisure activities or doing fun things quietly?  no  · Feel "on the go" or "driven by a motor"? no  · Blurt out answers before questions have been completed? no  · Have difficulty waiting your turn, are impatient?  no  · Interrupt or intrude on others? no    Psychiatric Medications: currently taking Buspar 7.5 mg po TID PRN    Past trials include: Zoloft (drowsiness), Wellbutrin    Psychosocial History: In [facility] program      Screens / Scales:  ASRS Adult ADHD Self-Report  Part A:  (20)  Part B: (14)     Medical History: " "IDDM    Review Of Systems:     GENERAL:  No weight gain or loss  SKIN:  No rashes or lacerations  HEAD:  No headaches  EYES:  No exophthalmos, jaundice or blindness  EARS:  No dizziness, tinnitus or hearing loss  NOSE:  No changes in smell  MOUTH & THROAT:  No dyskinetic movements or obvious goiter  CHEST:  No shortness of breath, hyperventilation or cough  CARDIOVASCULAR:  No tachycardia or chest pain  ABDOMEN:  No nausea, vomiting, pain, constipation or diarrhea  URINARY:  No frequency, dysuria or sexual dysfunction  ENDOCRINE:  No polydipsia, polyuria  MUSCULOSKELETAL:  No pain or stiffness of the joints  NEUROLOGIC:  No weakness, sensory changes, seizures, confusion, memory loss, tremor or other abnormal movements    Current Evaluation:     Nutritional Screening: Considering the patient's height and weight, medications, medical history and preferences, should a referral be made to the dietitian? no    Constitutional  Vitals:  Most recent vital signs, dated greater than 90 days prior to this appointment, were reviewed.    Vitals:    06/26/18 0747   BP: (!) 161/91   Pulse: 81   Weight: 112.9 kg (249 lb 0.2 oz)   Height: 6' 2" (1.88 m)        General:  unremarkable, age appropriate     Musculoskeletal  Muscle Strength/Tone:  no tremor, no tic   Gait & Station:  non-ataxic     Psychiatric  Speech:  no latency; no press   Mood & Affect:  euthymic  congruent and appropriate   Thought Process:  normal and logical   Associations:  intact   Thought Content:  normal, no suicidality, no homicidality, delusions, or paranoia   Insight:  intact   Judgement: behavior is adequate to circumstances   Orientation:  grossly intact   Memory: intact for content of interview   Language: grossly intact   Attention Span & Concentration:  able to focus   Fund of Knowledge:  intact and appropriate to age and level of education       Relevant Elements of Neurological Exam: normal gait    Functioning in Relationships:  Spouse/partner: see " above HPI  Peers: see above HPI  Employers: see above HPI    Laboratory Data  No visits with results within 1 Month(s) from this visit.   Latest known visit with results is:   Lab Visit on 04/11/2018   Component Date Value Ref Range Status    Sodium 04/11/2018 136  136 - 145 mmol/L Final    Potassium 04/11/2018 5.1  3.5 - 5.1 mmol/L Final    Chloride 04/11/2018 98  95 - 110 mmol/L Final    CO2 04/11/2018 31* 23 - 29 mmol/L Final    Glucose 04/11/2018 217* 70 - 110 mg/dL Final    BUN, Bld 04/11/2018 20  6 - 20 mg/dL Final    Creatinine 04/11/2018 0.9  0.5 - 1.4 mg/dL Final    Calcium 04/11/2018 9.8  8.7 - 10.5 mg/dL Final    Total Protein 04/11/2018 7.4  6.0 - 8.4 g/dL Final    Albumin 04/11/2018 3.8  3.5 - 5.2 g/dL Final    Total Bilirubin 04/11/2018 0.5  0.1 - 1.0 mg/dL Final    Alkaline Phosphatase 04/11/2018 132  55 - 135 U/L Final    AST 04/11/2018 12  10 - 40 U/L Final    ALT 04/11/2018 17  10 - 44 U/L Final    Anion Gap 04/11/2018 7* 8 - 16 mmol/L Final    eGFR if African American 04/11/2018 >60.0  >60 mL/min/1.73 m^2 Final    eGFR if non African American 04/11/2018 >60.0  >60 mL/min/1.73 m^2 Final    Hemoglobin A1C 04/11/2018 9.9* 4.0 - 5.6 % Final    Estimated Avg Glucose 04/11/2018 237* 68 - 131 mg/dL Final         Medications  Outpatient Encounter Prescriptions as of 6/26/2018   Medication Sig Dispense Refill    baclofen (LIORESAL) 20 MG tablet Take 1 tablet (20 mg total) by mouth daily as needed (muscle spasm). 30 tablet 11    busPIRone (BUSPAR) 7.5 MG tablet Take 1 tablet (7.5 mg total) by mouth 3 (three) times daily as needed (anxiety). 90 tablet 11    dulaglutide (TRULICITY) 0.75 mg/0.5 mL PnIj Inject 0.75 mg into the skin every 7 days.      esomeprazole (NEXIUM) 40 MG capsule       etodolac (LODINE) 200 MG Cap Take 1 capsule (200 mg total) by mouth daily as needed (pain). 30 capsule 11    HUMALOG KWIKPEN 200 unit/mL (3 mL) InPn       metformin (GLUCOPHAGE XR) 500 MG 24 hr  tablet Take 500 mg by mouth daily with breakfast.      sildenafil, antihypertensive, (REVATIO) 20 mg Tab Take 1 tablet (20 mg total) by mouth As instructed. Take 2-5 tablets as needed. 100 tablet 11    TRESIBA FLEXTOUCH U-200 200 unit/mL (3 mL) InPn INJECT 40 UNITS SUBCUTANEOUSLY AT BEDTIME  3     No facility-administered encounter medications on file as of 6/26/2018.            Assessment - Diagnosis - Goals:     Impression:      ICD-10-CM ICD-9-CM   1. Attention deficit hyperactivity disorder (ADHD), predominantly inattentive type F90.0 314.00   2. Situational anxiety F41.8 300.09       Strengths and Liabilities: Strength: Patient accepts guidance/feedback, Strength: Patient is expressive/articulate., Strength: Patient is intelligent., Liability: Patient lacks coping skills.    Treatment Goals:  Specify outcomes written in observable, behavioral terms:   Anxiety: acquiring relapse prevention skills, reducing negative automatic thoughts, reducing physical symptoms of anxiety and reducing time spent worrying (<30 minutes/day)  ADHD: will improve capacity for sustained attention and focus    Treatment Plan/Recommendations:   · Medication Management: The risks and benefits of medication were discussed with the patient.  · The treatment plan and follow up plan were reviewed with the patient.   · Adderall 10 mg po BID ( 2 month Rx printed)  · Continue Buspar 7.5 mg po TID   · Reviewed chart and labs  · Completed ASRS screens/scales  · Counseling this visit focused on building adaptive coping skills for anxiety, cognitive behavioral therapy (CBT) resources, and behavior modification strategies for ADHD.     Return to Clinic: 6 weeks    Counseling time: 32 minutes  Total time: 60 minutes  Consulting clinician was informed of the encounter and consult note.

## 2018-07-28 NOTE — PROGRESS NOTES
Subjective:       Patient ID: Chip Arora is a 38 y.o. male.    Chief Complaint: Annual Exam    Patient is a 38 y.o.male who presents today for annual. (+) fatigue lately.    Wants to establish with new endo    Cholesterol: (due now)  Eye: dr. Gallego; [place]  -Vaccines: Influenza (done); Tetanus (due in two years); declines pneumonia vaccine for now  -Exercise: yes, walks dog for one hour 2-3 weeks; lots of stretching; push ups; weight lifting  -Diet: diabetic diet; low carb and low fat; eating more just at home        Review of Systems   Constitutional: Negative for activity change, appetite change, chills, diaphoresis, fatigue, fever and unexpected weight change.   HENT: Negative for congestion, dental problem, ear discharge, ear pain, hearing loss, postnasal drip, rhinorrhea, sinus pressure, sore throat and trouble swallowing.    Eyes: Positive for visual disturbance. Negative for discharge, redness and itching.   Respiratory: Negative for chest tightness, shortness of breath and wheezing.    Cardiovascular: Positive for palpitations. Negative for chest pain and leg swelling.   Gastrointestinal: Positive for diarrhea. Negative for abdominal pain, blood in stool, constipation, nausea and vomiting.   Endocrine: Negative for cold intolerance, heat intolerance, polydipsia and polyuria.   Genitourinary: Negative for difficulty urinating, frequency, hematuria and urgency.   Musculoskeletal: Positive for arthralgias and neck pain. Negative for back pain, gait problem, joint swelling and myalgias.   Skin: Negative for color change and rash.   Allergic/Immunologic: Negative for environmental allergies.   Neurological: Positive for headaches. Negative for dizziness, syncope and weakness.   Hematological: Negative for adenopathy.   Psychiatric/Behavioral: Positive for dysphoric mood. Negative for behavioral problems, confusion and sleep disturbance. The patient is not nervous/anxious.        Objective:      Physical  Exam   Constitutional: He is oriented to person, place, and time. He appears well-developed and well-nourished. No distress.   HENT:   Head: Normocephalic and atraumatic.   Right Ear: External ear normal.   Left Ear: External ear normal.   Nose: Nose normal.   Mouth/Throat: Oropharynx is clear and moist. No oropharyngeal exudate.   Eyes: Conjunctivae and EOM are normal. Pupils are equal, round, and reactive to light. Right eye exhibits no discharge. Left eye exhibits no discharge. No scleral icterus.   Neck: Normal range of motion. Neck supple. No JVD present. No tracheal deviation present. No thyromegaly present.   Cardiovascular: Normal rate, regular rhythm, normal heart sounds and intact distal pulses.  Exam reveals no gallop and no friction rub.    No murmur heard.  Pulmonary/Chest: Effort normal and breath sounds normal. No stridor. No respiratory distress. He has no wheezes. He has no rales. He exhibits no tenderness.   Abdominal: Soft. Bowel sounds are normal. He exhibits no distension. There is no tenderness. There is no rebound.   Musculoskeletal: Normal range of motion. He exhibits no edema or tenderness.   Lymphadenopathy:     He has no cervical adenopathy.   Neurological: He is alert and oriented to person, place, and time. He has normal reflexes. No cranial nerve deficit.   Skin: Skin is warm and dry. No rash noted. He is not diaphoretic. No erythema.   Psychiatric: He has a normal mood and affect. His behavior is normal.   Nursing note and vitals reviewed.      Assessment and Plan:       1. Annual physical exam  - labs due  - declines pneumonia vaccine  - Testosterone; Future  - Vitamin B12; Future    2. Fatigue, unspecified type  - due for labs  - Testosterone; Future  - Vitamin B12; Future  - Testosterone, free; Future    3. Type 2 diabetes mellitus without complication, with long-term current use of insulin  - Ambulatory referral to Endocrinology    4. Allergic rhinitis, unspecified seasonality,  unspecified trigger  - trial of xyal and astelin          No Follow-up on file.

## 2018-07-30 ENCOUNTER — PATIENT MESSAGE (OUTPATIENT)
Dept: INTERNAL MEDICINE | Facility: CLINIC | Age: 38
End: 2018-07-30

## 2018-07-30 DIAGNOSIS — Z00.00 ROUTINE GENERAL MEDICAL EXAMINATION AT A HEALTH CARE FACILITY: Primary | ICD-10-CM

## 2018-07-31 ENCOUNTER — OFFICE VISIT (OUTPATIENT)
Dept: INTERNAL MEDICINE | Facility: CLINIC | Age: 38
End: 2018-07-31
Payer: COMMERCIAL

## 2018-07-31 VITALS
BODY MASS INDEX: 31.46 KG/M2 | DIASTOLIC BLOOD PRESSURE: 88 MMHG | HEART RATE: 97 BPM | TEMPERATURE: 99 F | HEIGHT: 74 IN | SYSTOLIC BLOOD PRESSURE: 126 MMHG | RESPIRATION RATE: 18 BRPM | WEIGHT: 245.13 LBS | OXYGEN SATURATION: 95 %

## 2018-07-31 DIAGNOSIS — J30.9 ALLERGIC RHINITIS, UNSPECIFIED SEASONALITY, UNSPECIFIED TRIGGER: ICD-10-CM

## 2018-07-31 DIAGNOSIS — Z79.4 TYPE 2 DIABETES MELLITUS WITHOUT COMPLICATION, WITH LONG-TERM CURRENT USE OF INSULIN: ICD-10-CM

## 2018-07-31 DIAGNOSIS — R53.83 FATIGUE, UNSPECIFIED TYPE: ICD-10-CM

## 2018-07-31 DIAGNOSIS — Z00.00 ANNUAL PHYSICAL EXAM: Primary | ICD-10-CM

## 2018-07-31 DIAGNOSIS — E11.9 TYPE 2 DIABETES MELLITUS WITHOUT COMPLICATION, WITH LONG-TERM CURRENT USE OF INSULIN: ICD-10-CM

## 2018-07-31 PROCEDURE — 3046F HEMOGLOBIN A1C LEVEL >9.0%: CPT | Mod: CPTII,S$GLB,, | Performed by: INTERNAL MEDICINE

## 2018-07-31 PROCEDURE — 99999 PR PBB SHADOW E&M-EST. PATIENT-LVL III: CPT | Mod: PBBFAC,,, | Performed by: INTERNAL MEDICINE

## 2018-07-31 PROCEDURE — 99395 PREV VISIT EST AGE 18-39: CPT | Mod: S$GLB,,, | Performed by: INTERNAL MEDICINE

## 2018-07-31 RX ORDER — LEVOCETIRIZINE DIHYDROCHLORIDE 5 MG/1
5 TABLET, FILM COATED ORAL NIGHTLY
Qty: 30 TABLET | Refills: 11 | Status: SHIPPED | OUTPATIENT
Start: 2018-07-31 | End: 2019-12-19 | Stop reason: SDUPTHER

## 2018-07-31 RX ORDER — AZELASTINE 1 MG/ML
1 SPRAY, METERED NASAL DAILY
Qty: 30 ML | Refills: 0 | Status: SHIPPED | OUTPATIENT
Start: 2018-07-31 | End: 2019-07-31

## 2018-08-01 ENCOUNTER — LAB VISIT (OUTPATIENT)
Dept: LAB | Facility: HOSPITAL | Age: 38
End: 2018-08-01
Attending: INTERNAL MEDICINE
Payer: COMMERCIAL

## 2018-08-01 ENCOUNTER — PATIENT MESSAGE (OUTPATIENT)
Dept: INTERNAL MEDICINE | Facility: CLINIC | Age: 38
End: 2018-08-01

## 2018-08-01 DIAGNOSIS — Z00.00 ROUTINE GENERAL MEDICAL EXAMINATION AT A HEALTH CARE FACILITY: ICD-10-CM

## 2018-08-01 DIAGNOSIS — Z00.00 ANNUAL PHYSICAL EXAM: ICD-10-CM

## 2018-08-01 DIAGNOSIS — R53.83 FATIGUE, UNSPECIFIED TYPE: ICD-10-CM

## 2018-08-01 LAB
25(OH)D3+25(OH)D2 SERPL-MCNC: 35 NG/ML
ALBUMIN SERPL BCP-MCNC: 3.8 G/DL
ALP SERPL-CCNC: 145 U/L
ALT SERPL W/O P-5'-P-CCNC: 17 U/L
ANION GAP SERPL CALC-SCNC: 10 MMOL/L
AST SERPL-CCNC: 13 U/L
BASOPHILS # BLD AUTO: 0.06 K/UL
BASOPHILS NFR BLD: 0.6 %
BILIRUB SERPL-MCNC: 0.3 MG/DL
BUN SERPL-MCNC: 13 MG/DL
CALCIUM SERPL-MCNC: 9.9 MG/DL
CHLORIDE SERPL-SCNC: 100 MMOL/L
CHOLEST SERPL-MCNC: 148 MG/DL
CHOLEST/HDLC SERPL: 5.1 {RATIO}
CO2 SERPL-SCNC: 30 MMOL/L
CREAT SERPL-MCNC: 0.8 MG/DL
DIFFERENTIAL METHOD: NORMAL
EOSINOPHIL # BLD AUTO: 0.5 K/UL
EOSINOPHIL NFR BLD: 4.8 %
ERYTHROCYTE [DISTWIDTH] IN BLOOD BY AUTOMATED COUNT: 13.2 %
EST. GFR  (AFRICAN AMERICAN): >60 ML/MIN/1.73 M^2
EST. GFR  (NON AFRICAN AMERICAN): >60 ML/MIN/1.73 M^2
ESTIMATED AVG GLUCOSE: 226 MG/DL
GLUCOSE SERPL-MCNC: 140 MG/DL
HBA1C MFR BLD HPLC: 9.5 %
HCT VFR BLD AUTO: 44.1 %
HDLC SERPL-MCNC: 29 MG/DL
HDLC SERPL: 19.6 %
HGB BLD-MCNC: 14.7 G/DL
IMM GRANULOCYTES # BLD AUTO: 0.02 K/UL
IMM GRANULOCYTES NFR BLD AUTO: 0.2 %
LDLC SERPL CALC-MCNC: 87 MG/DL
LYMPHOCYTES # BLD AUTO: 2.8 K/UL
LYMPHOCYTES NFR BLD: 28.8 %
MCH RBC QN AUTO: 27.5 PG
MCHC RBC AUTO-ENTMCNC: 33.3 G/DL
MCV RBC AUTO: 83 FL
MONOCYTES # BLD AUTO: 0.9 K/UL
MONOCYTES NFR BLD: 9.6 %
NEUTROPHILS # BLD AUTO: 5.5 K/UL
NEUTROPHILS NFR BLD: 56 %
NONHDLC SERPL-MCNC: 119 MG/DL
NRBC BLD-RTO: 0 /100 WBC
PLATELET # BLD AUTO: 300 K/UL
PMV BLD AUTO: 10.6 FL
POTASSIUM SERPL-SCNC: 4.5 MMOL/L
PROT SERPL-MCNC: 7.4 G/DL
RBC # BLD AUTO: 5.34 M/UL
SODIUM SERPL-SCNC: 140 MMOL/L
TESTOST SERPL-MCNC: 201 NG/DL
TRIGL SERPL-MCNC: 160 MG/DL
TSH SERPL DL<=0.005 MIU/L-ACNC: 3.73 UIU/ML
VIT B12 SERPL-MCNC: 441 PG/ML
WBC # BLD AUTO: 9.77 K/UL

## 2018-08-01 PROCEDURE — 85025 COMPLETE CBC W/AUTO DIFF WBC: CPT

## 2018-08-01 PROCEDURE — 80053 COMPREHEN METABOLIC PANEL: CPT

## 2018-08-01 PROCEDURE — 82306 VITAMIN D 25 HYDROXY: CPT

## 2018-08-01 PROCEDURE — 80061 LIPID PANEL: CPT

## 2018-08-01 PROCEDURE — 83036 HEMOGLOBIN GLYCOSYLATED A1C: CPT

## 2018-08-01 PROCEDURE — 36415 COLL VENOUS BLD VENIPUNCTURE: CPT | Mod: PO

## 2018-08-01 PROCEDURE — 84402 ASSAY OF FREE TESTOSTERONE: CPT

## 2018-08-01 PROCEDURE — 82607 VITAMIN B-12: CPT

## 2018-08-01 PROCEDURE — 84403 ASSAY OF TOTAL TESTOSTERONE: CPT

## 2018-08-01 PROCEDURE — 84443 ASSAY THYROID STIM HORMONE: CPT

## 2018-08-03 LAB — TESTOST FREE SERPL-MCNC: 8.4 PG/ML

## 2018-08-11 ENCOUNTER — PATIENT MESSAGE (OUTPATIENT)
Dept: INTERNAL MEDICINE | Facility: CLINIC | Age: 38
End: 2018-08-11

## 2018-08-11 DIAGNOSIS — E34.9 TESTOSTERONE DEFICIENCY: Primary | ICD-10-CM

## 2018-08-11 DIAGNOSIS — Z79.4 TYPE 2 DIABETES MELLITUS WITHOUT COMPLICATION, WITH LONG-TERM CURRENT USE OF INSULIN: ICD-10-CM

## 2018-08-11 DIAGNOSIS — E11.9 TYPE 2 DIABETES MELLITUS WITHOUT COMPLICATION, WITH LONG-TERM CURRENT USE OF INSULIN: ICD-10-CM

## 2018-08-18 ENCOUNTER — PATIENT MESSAGE (OUTPATIENT)
Dept: INTERNAL MEDICINE | Facility: CLINIC | Age: 38
End: 2018-08-18

## 2018-08-18 RX ORDER — AMOXICILLIN AND CLAVULANATE POTASSIUM 875; 125 MG/1; MG/1
1 TABLET, FILM COATED ORAL 2 TIMES DAILY
Qty: 20 TABLET | Refills: 0 | Status: SHIPPED | OUTPATIENT
Start: 2018-08-18 | End: 2018-08-28

## 2018-10-24 ENCOUNTER — PATIENT MESSAGE (OUTPATIENT)
Dept: PSYCHIATRY | Facility: CLINIC | Age: 38
End: 2018-10-24

## 2018-10-24 DIAGNOSIS — F90.0 ATTENTION DEFICIT HYPERACTIVITY DISORDER (ADHD), PREDOMINANTLY INATTENTIVE TYPE: ICD-10-CM

## 2018-10-24 RX ORDER — DEXTROAMPHETAMINE SACCHARATE, AMPHETAMINE ASPARTATE, DEXTROAMPHETAMINE SULFATE AND AMPHETAMINE SULFATE 2.5; 2.5; 2.5; 2.5 MG/1; MG/1; MG/1; MG/1
10 TABLET ORAL 2 TIMES DAILY
Qty: 60 TABLET | Refills: 0 | Status: SHIPPED | OUTPATIENT
Start: 2018-10-24 | End: 2019-06-20 | Stop reason: SDUPTHER

## 2018-11-15 ENCOUNTER — PATIENT MESSAGE (OUTPATIENT)
Dept: INTERNAL MEDICINE | Facility: CLINIC | Age: 38
End: 2018-11-15

## 2018-11-15 RX ORDER — INSULIN DEGLUDEC 200 U/ML
INJECTION, SOLUTION SUBCUTANEOUS
Qty: 3 ML | Refills: 3 | Status: SHIPPED | OUTPATIENT
Start: 2018-11-15 | End: 2019-01-12 | Stop reason: SDUPTHER

## 2018-11-19 ENCOUNTER — PATIENT MESSAGE (OUTPATIENT)
Dept: INTERNAL MEDICINE | Facility: CLINIC | Age: 38
End: 2018-11-19

## 2018-11-26 ENCOUNTER — PATIENT MESSAGE (OUTPATIENT)
Dept: INTERNAL MEDICINE | Facility: CLINIC | Age: 38
End: 2018-11-26

## 2018-11-27 NOTE — PROGRESS NOTES
Subjective:       Patient ID: Chip Arora is a 38 y.o. male.    Chief Complaint: Follow-up; FMLA forms; and Sinusitis    Patient is a 38 y.o.male who presents today for follow up. Needs FMLA forms completed. Started with sinus congestion and productive cough last Friday. Symptoms not improved with otc meds.    Cholesterol: (due now; needs new endo doc)  Eye: dr. Gallego; [place]  -Vaccines: Influenza (done); Tetanus (due in two years); declines pneumonia vaccine for now  -Exercise: yes, walks dog for one hour 2-3 weeks; lots of stretching; push ups; weight lifting  -Diet: diabetic diet; low carb and low fat; eating more just at home    Review of Systems   Constitutional: Negative for appetite change, chills, diaphoresis, fatigue and fever.   HENT: Positive for congestion, postnasal drip, sinus pressure and sore throat. Negative for dental problem, ear discharge, ear pain and hearing loss.    Eyes: Negative for discharge, redness and itching.   Respiratory: Negative for chest tightness, shortness of breath and wheezing.    Cardiovascular: Negative for chest pain, palpitations and leg swelling.   Gastrointestinal: Negative for abdominal pain, constipation, diarrhea, nausea and vomiting.   Endocrine: Negative for cold intolerance and heat intolerance.   Genitourinary: Negative for difficulty urinating, frequency, hematuria and urgency.   Musculoskeletal: Negative for arthralgias, back pain, gait problem, myalgias and neck pain.   Skin: Negative for color change and rash.   Allergic/Immunologic: Negative for environmental allergies.   Neurological: Negative for dizziness, syncope and headaches.   Hematological: Negative for adenopathy.   Psychiatric/Behavioral: Negative for behavioral problems and sleep disturbance. The patient is not nervous/anxious.        Objective:      Physical Exam   Constitutional: He is oriented to person, place, and time. He appears well-developed and well-nourished. No distress.   HENT:    Head: Normocephalic and atraumatic.   Right Ear: Tympanic membrane and external ear normal.   Left Ear: Tympanic membrane and external ear normal.   Nose: Mucosal edema and rhinorrhea present.   Mouth/Throat: Uvula is midline, oropharynx is clear and moist and mucous membranes are normal. No oropharyngeal exudate, posterior oropharyngeal edema, posterior oropharyngeal erythema or tonsillar abscesses.   Eyes: Conjunctivae and EOM are normal. Pupils are equal, round, and reactive to light. Right eye exhibits no discharge. Left eye exhibits no discharge. No scleral icterus.   Neck: Normal range of motion. Neck supple. No JVD present. No tracheal deviation present. No thyromegaly present.   Cardiovascular: Normal rate, regular rhythm, normal heart sounds and intact distal pulses. Exam reveals no gallop and no friction rub.   No murmur heard.  Pulmonary/Chest: Effort normal and breath sounds normal. No stridor. No respiratory distress. He has no wheezes. He has no rales. He exhibits no tenderness.   Abdominal: Soft. Bowel sounds are normal. He exhibits no distension. There is no tenderness. There is no rebound.   Musculoskeletal: Normal range of motion. He exhibits no edema or tenderness.   Lymphadenopathy:     He has no cervical adenopathy.   Neurological: He is alert and oriented to person, place, and time. He has normal reflexes. No cranial nerve deficit.   Skin: Skin is warm and dry. No rash noted. He is not diaphoretic. No erythema.   Psychiatric: He has a normal mood and affect. His behavior is normal.   Nursing note and vitals reviewed.      Assessment and Plan:       1. Type 2 diabetes mellitus without complication, with long-term current use of insulin  - FMLA forms completed  - due for labs  - Ambulatory referral to Endocrinology    2. Sinusitis, unspecified chronicity, unspecified location  - rest, fluids; astelin bid  - doxycycline (VIBRAMYCIN) 100 MG Cap; Take 1 capsule (100 mg total) by mouth 2 (two)  times daily. for 7 days  Dispense: 14 capsule; Refill: 0    Notify clinic if symptoms change, worsen or do not improve        No Follow-up on file.

## 2018-11-28 ENCOUNTER — OFFICE VISIT (OUTPATIENT)
Dept: INTERNAL MEDICINE | Facility: CLINIC | Age: 38
End: 2018-11-28
Payer: COMMERCIAL

## 2018-11-28 ENCOUNTER — TELEPHONE (OUTPATIENT)
Dept: INTERNAL MEDICINE | Facility: CLINIC | Age: 38
End: 2018-11-28

## 2018-11-28 VITALS
BODY MASS INDEX: 31.51 KG/M2 | DIASTOLIC BLOOD PRESSURE: 86 MMHG | HEART RATE: 76 BPM | HEIGHT: 74 IN | RESPIRATION RATE: 16 BRPM | SYSTOLIC BLOOD PRESSURE: 132 MMHG | WEIGHT: 245.56 LBS | TEMPERATURE: 99 F

## 2018-11-28 DIAGNOSIS — E11.9 TYPE 2 DIABETES MELLITUS WITHOUT COMPLICATION, WITH LONG-TERM CURRENT USE OF INSULIN: Primary | ICD-10-CM

## 2018-11-28 DIAGNOSIS — J32.9 SINUSITIS, UNSPECIFIED CHRONICITY, UNSPECIFIED LOCATION: ICD-10-CM

## 2018-11-28 DIAGNOSIS — Z79.4 TYPE 2 DIABETES MELLITUS WITHOUT COMPLICATION, WITH LONG-TERM CURRENT USE OF INSULIN: Primary | ICD-10-CM

## 2018-11-28 PROCEDURE — 3046F HEMOGLOBIN A1C LEVEL >9.0%: CPT | Mod: CPTII,S$GLB,, | Performed by: INTERNAL MEDICINE

## 2018-11-28 PROCEDURE — 3008F BODY MASS INDEX DOCD: CPT | Mod: CPTII,S$GLB,, | Performed by: INTERNAL MEDICINE

## 2018-11-28 PROCEDURE — 99214 OFFICE O/P EST MOD 30 MIN: CPT | Mod: S$GLB,,, | Performed by: INTERNAL MEDICINE

## 2018-11-28 PROCEDURE — 99999 PR PBB SHADOW E&M-EST. PATIENT-LVL III: CPT | Mod: PBBFAC,,, | Performed by: INTERNAL MEDICINE

## 2018-11-28 RX ORDER — DOXYCYCLINE 100 MG/1
100 CAPSULE ORAL 2 TIMES DAILY
Qty: 14 CAPSULE | Refills: 0 | Status: SHIPPED | OUTPATIENT
Start: 2018-11-28 | End: 2018-12-05

## 2018-11-28 NOTE — TELEPHONE ENCOUNTER
----- Message from Nayana Frye sent at 11/28/2018  1:12 PM CST -----  Doctor appointment and lab have been scheduled.  Please link lab orders to the lab appointment.  Date of doctor appointment:    Physical or EP:  EP  Date of lab appointment:  12/3  Comments:

## 2019-01-09 ENCOUNTER — LAB VISIT (OUTPATIENT)
Dept: LAB | Facility: HOSPITAL | Age: 39
End: 2019-01-09
Attending: INTERNAL MEDICINE
Payer: COMMERCIAL

## 2019-01-09 DIAGNOSIS — E11.9 TYPE 2 DIABETES MELLITUS WITHOUT COMPLICATION, WITH LONG-TERM CURRENT USE OF INSULIN: ICD-10-CM

## 2019-01-09 DIAGNOSIS — Z79.4 TYPE 2 DIABETES MELLITUS WITHOUT COMPLICATION, WITH LONG-TERM CURRENT USE OF INSULIN: ICD-10-CM

## 2019-01-09 LAB
ALBUMIN SERPL BCP-MCNC: 3.9 G/DL
ALP SERPL-CCNC: 114 U/L
ALT SERPL W/O P-5'-P-CCNC: 23 U/L
ANION GAP SERPL CALC-SCNC: 7 MMOL/L
AST SERPL-CCNC: 18 U/L
BILIRUB SERPL-MCNC: 0.5 MG/DL
BUN SERPL-MCNC: 16 MG/DL
CALCIUM SERPL-MCNC: 9.7 MG/DL
CHLORIDE SERPL-SCNC: 102 MMOL/L
CHOLEST SERPL-MCNC: 184 MG/DL
CHOLEST/HDLC SERPL: 5.6 {RATIO}
CO2 SERPL-SCNC: 29 MMOL/L
CREAT SERPL-MCNC: 0.9 MG/DL
EST. GFR  (AFRICAN AMERICAN): >60 ML/MIN/1.73 M^2
EST. GFR  (NON AFRICAN AMERICAN): >60 ML/MIN/1.73 M^2
ESTIMATED AVG GLUCOSE: 229 MG/DL
GLUCOSE SERPL-MCNC: 134 MG/DL
HBA1C MFR BLD HPLC: 9.6 %
HDLC SERPL-MCNC: 33 MG/DL
HDLC SERPL: 17.9 %
LDLC SERPL CALC-MCNC: 129.4 MG/DL
NONHDLC SERPL-MCNC: 151 MG/DL
POTASSIUM SERPL-SCNC: 4.6 MMOL/L
PROT SERPL-MCNC: 7.6 G/DL
SODIUM SERPL-SCNC: 138 MMOL/L
TRIGL SERPL-MCNC: 108 MG/DL

## 2019-01-09 PROCEDURE — 80061 LIPID PANEL: CPT

## 2019-01-09 PROCEDURE — 80053 COMPREHEN METABOLIC PANEL: CPT

## 2019-01-09 PROCEDURE — 83036 HEMOGLOBIN GLYCOSYLATED A1C: CPT

## 2019-01-09 PROCEDURE — 36415 COLL VENOUS BLD VENIPUNCTURE: CPT | Mod: PO

## 2019-01-10 ENCOUNTER — OFFICE VISIT (OUTPATIENT)
Dept: ENDOCRINOLOGY | Facility: CLINIC | Age: 39
End: 2019-01-10
Payer: COMMERCIAL

## 2019-01-10 VITALS
HEIGHT: 74 IN | HEART RATE: 82 BPM | BODY MASS INDEX: 31.63 KG/M2 | SYSTOLIC BLOOD PRESSURE: 140 MMHG | WEIGHT: 246.5 LBS | DIASTOLIC BLOOD PRESSURE: 88 MMHG

## 2019-01-10 DIAGNOSIS — E11.9 TYPE 2 DIABETES MELLITUS WITHOUT COMPLICATION, WITH LONG-TERM CURRENT USE OF INSULIN: ICD-10-CM

## 2019-01-10 DIAGNOSIS — Z79.4 TYPE 2 DIABETES MELLITUS WITH MICROALBUMINURIA, WITH LONG-TERM CURRENT USE OF INSULIN: Primary | ICD-10-CM

## 2019-01-10 DIAGNOSIS — E11.29 TYPE 2 DIABETES MELLITUS WITH MICROALBUMINURIA, WITH LONG-TERM CURRENT USE OF INSULIN: Primary | ICD-10-CM

## 2019-01-10 DIAGNOSIS — R80.9 TYPE 2 DIABETES MELLITUS WITH MICROALBUMINURIA, WITH LONG-TERM CURRENT USE OF INSULIN: Primary | ICD-10-CM

## 2019-01-10 DIAGNOSIS — Z79.4 TYPE 2 DIABETES MELLITUS WITHOUT COMPLICATION, WITH LONG-TERM CURRENT USE OF INSULIN: ICD-10-CM

## 2019-01-10 DIAGNOSIS — I10 ESSENTIAL HYPERTENSION: ICD-10-CM

## 2019-01-10 DIAGNOSIS — E78.2 MIXED DYSLIPIDEMIA: ICD-10-CM

## 2019-01-10 LAB — GLUCOSE SERPL-MCNC: 154 MG/DL (ref 70–110)

## 2019-01-10 PROCEDURE — 3046F HEMOGLOBIN A1C LEVEL >9.0%: CPT | Mod: CPTII,S$GLB,, | Performed by: INTERNAL MEDICINE

## 2019-01-10 PROCEDURE — 3077F SYST BP >= 140 MM HG: CPT | Mod: CPTII,S$GLB,, | Performed by: INTERNAL MEDICINE

## 2019-01-10 PROCEDURE — 99204 OFFICE O/P NEW MOD 45 MIN: CPT | Mod: S$GLB,,, | Performed by: INTERNAL MEDICINE

## 2019-01-10 PROCEDURE — 3079F DIAST BP 80-89 MM HG: CPT | Mod: CPTII,S$GLB,, | Performed by: INTERNAL MEDICINE

## 2019-01-10 PROCEDURE — 99999 PR PBB SHADOW E&M-EST. PATIENT-LVL IV: ICD-10-PCS | Mod: PBBFAC,,, | Performed by: INTERNAL MEDICINE

## 2019-01-10 PROCEDURE — 82962 GLUCOSE BLOOD TEST: CPT | Mod: S$GLB,,, | Performed by: INTERNAL MEDICINE

## 2019-01-10 PROCEDURE — 3079F PR MOST RECENT DIASTOLIC BLOOD PRESSURE 80-89 MM HG: ICD-10-PCS | Mod: CPTII,S$GLB,, | Performed by: INTERNAL MEDICINE

## 2019-01-10 PROCEDURE — 82962 POCT GLUCOSE, HAND-HELD DEVICE: ICD-10-PCS | Mod: S$GLB,,, | Performed by: INTERNAL MEDICINE

## 2019-01-10 PROCEDURE — 3008F PR BODY MASS INDEX (BMI) DOCUMENTED: ICD-10-PCS | Mod: CPTII,S$GLB,, | Performed by: INTERNAL MEDICINE

## 2019-01-10 PROCEDURE — 3046F PR MOST RECENT HEMOGLOBIN A1C LEVEL > 9.0%: ICD-10-PCS | Mod: CPTII,S$GLB,, | Performed by: INTERNAL MEDICINE

## 2019-01-10 PROCEDURE — 99204 PR OFFICE/OUTPT VISIT, NEW, LEVL IV, 45-59 MIN: ICD-10-PCS | Mod: S$GLB,,, | Performed by: INTERNAL MEDICINE

## 2019-01-10 PROCEDURE — 99999 PR PBB SHADOW E&M-EST. PATIENT-LVL IV: CPT | Mod: PBBFAC,,, | Performed by: INTERNAL MEDICINE

## 2019-01-10 PROCEDURE — 3008F BODY MASS INDEX DOCD: CPT | Mod: CPTII,S$GLB,, | Performed by: INTERNAL MEDICINE

## 2019-01-10 PROCEDURE — 3077F PR MOST RECENT SYSTOLIC BLOOD PRESSURE >= 140 MM HG: ICD-10-PCS | Mod: CPTII,S$GLB,, | Performed by: INTERNAL MEDICINE

## 2019-01-10 RX ORDER — OLMESARTAN MEDOXOMIL 20 MG/1
20 TABLET ORAL DAILY
Qty: 60 TABLET | Refills: 3 | Status: SHIPPED | OUTPATIENT
Start: 2019-01-10 | End: 2020-01-08 | Stop reason: SDUPTHER

## 2019-01-10 RX ORDER — ATORVASTATIN CALCIUM 20 MG/1
20 TABLET, FILM COATED ORAL NIGHTLY
Qty: 60 TABLET | Refills: 3 | Status: SHIPPED | OUTPATIENT
Start: 2019-01-10 | End: 2019-07-12

## 2019-01-10 NOTE — PROGRESS NOTES
Subjective:      Patient ID: Chip Arora is a 38 y.o. male.    Chief Complaint: Type 2 DM    History of Present Illness  A 39 yo man with a diagnosis of type 2 DM, is here to establish care.   Medical history includes depression, ADHD, GERD.    Was initially dx in . Reports having symptoms of polyuria at that time. At the time of dx, he was started on Metformin. Was also on Rosiglitazone for a short period (about 8 months). Insulin was added in .   Reports having diarrhea when the dose of Metformin was increased.    Current medications:   -Metformin  mg daily  -Trulicity 0.75 mg weekly (started about 1 year ago)  -Tresiba 40 units, hs  -Humalog 12 units with breakfast, 14-16 units with lunch and dinner    Reports missing Humalog around lunchtime about once in 2 weeks.  Hasn't taken Tresiba the past two nights due to delay in the mail order.    BG ranges as follows:  Fastin-150  Before dinner: 150-180      Hypoglycemia: No      Diabetes complications: diabetic retinopathy (s/p laser therapy), neuropathy    HbA1c: 9.6%  Urine micro-albumin: (Aug, 2018): 81 ug/ml    ACE inhibitor of angiotensin II receptor blocker: no  Statin: no  Low dose ASA: no  Eye exam within last year: last week  Dental exam: 6 months ago  Podiatry exam: about one and a half years ago    Denies personal history of thyroid cancer, pancreatitis, or DKA.    FH: mother has type 2 DM. No h/o thyroid cancer.    Reports erectile dysfunction. First noticed about one year ago. Currently takes Sildenafil as needed (prescribed by Urology).   Reports not having spontaneous erections. Reports normal libido.      Review of Systems   Constitutional: Negative for unexpected weight change.   Eyes: Negative for visual disturbance.   Respiratory: Negative for shortness of breath.    Cardiovascular: Negative for chest pain.   Gastrointestinal: Negative for abdominal pain.   Genitourinary: Negative for urgency.   Musculoskeletal: Negative for  arthralgias.   Skin: Negative for wound.   Neurological: Negative for headaches.   Hematological: Does not bruise/bleed easily.   Psychiatric/Behavioral: Negative for sleep disturbance.       Objective:   Physical Exam   Constitutional: He is oriented to person, place, and time. He appears well-developed.   HENT:   Right Ear: External ear normal.   Left Ear: External ear normal.   Nose: Nose normal.   Hearing grossly normal  Dentition grossly normal   Neck: No tracheal deviation present. No thyromegaly present.   Cardiovascular: Normal rate.   No murmur heard.  Pulmonary/Chest: Effort normal and breath sounds normal.   Abdominal: Soft. He exhibits no mass. There is no tenderness.   Musculoskeletal: He exhibits no edema.   No digital clubbing or extremity cyanosis   Neurological: He is alert and oriented to person, place, and time. Coordination normal.   Decreased sensation in right foot (plantar surface)   Skin: No rash noted.   No subcutaneous nodules noted.   Psychiatric: He has a normal mood and affect. His behavior is normal.   Alert and oriented to person, place, and situation.   Nursing note and vitals reviewed.      Lab Review:   Results for MARVEL SALAS (MRN [id number]) as of 1/10/2019 08:10   Ref. Range 1/9/2019 10:51   Hemoglobin A1C Latest Ref Range: 4.0 - 5.6 % 9.6 (H)   Estimated Avg Glucose Latest Ref Range: 68 - 131 mg/dL 229 (H)        Ref. Range 8/1/2018 10:36   Testosterone, Free Latest Ref Range: 5.1 - 41.5 pg/mL 8.4   Testosterone, Total Latest Ref Range: 195.0 - 1138.0 ng/dL 201     Assessment & Plan:   Type 2 diabetes mellitus with ckd 1, DR with long-term current use of insulin  Pt has poorly controlled type 2 DM with HbA1c of 9.6%    -Continue current insulin regimen:Tresiba 40 units, hs and Humalog 12 units with breakfast, 14-16 units with lunch and dinner    -Continue Metformin  mg daily  -Add Empagliflozin 10 mg daily  -Increase dose of Trulicity to 1.5 mg weekly  -Add olmesartan as pt  has elevated urine microalbumin  -Add Atorvastatin 20 mg, hs  -Follow up with Podiatry  -Follow up with diabetic education  -Repeat HbA1c in 3 months  -Follow up in 3 months    Discussed with Dr. Santiago GUSTAFSON, Julissa Henderson MD,  have personally taken the history and examined the patient and agree with the resident's note as stated above.    initially declined education but may reconsider   Reminded that our dept policy of yearly education.        Essential hypertension  Add arb    Mixed dyslipidemia  Add statin per ADA recommendations

## 2019-01-10 NOTE — ASSESSMENT & PLAN NOTE
Pt has poorly controlled type 2 DM with HbA1c of 9.6%    -Continue current insulin regimen:Tresiba 40 units, hs and Humalog 12 units with breakfast, 14-16 units with lunch and dinner    -Continue Metformin  mg daily  -Add Empagliflozin 10 mg daily  -Increase dose of Trulicity to 1.5 mg weekly  -Add olmesartan as pt has elevated urine microalbumin  -Add Atorvastatin 20 mg, hs  -Follow up with Podiatry  -Follow up with diabetic education  -Repeat HbA1c in 3 months  -Follow up in 3 months

## 2019-01-10 NOTE — LETTER
January 10, 2019      Yessenia Albert, DO  [address] [facility] [place] [address]           [facility] - Endocrinology  [address]  [place] [address]  Phone: [phone]          Patient: Chip Arora   MR Number: [id number]   YOB: 1980   Date of Visit: 1/10/2019       Dear Dr. Yessenia Albert:    Thank you for referring Chip Arora to me for evaluation. Attached you will find relevant portions of my assessment and plan of care.    If you have questions, please do not hesitate to call me. I look forward to following Chip Arora along with you.    Sincerely,    Yaima Reynoso MD    Enclosure  CC:  No Recipients    If you would like to receive this communication electronically, please contact [email or web address] or [phone] to request more information on [email or web address] Link access.    For providers and/or their staff who would like to refer a patient to [facility], please contact us through our one-stop-shop provider referral line, [facility], at [phone].    If you feel you have received this communication in error or would no longer like to receive these types of communications, please e-mail [email or web address]

## 2019-01-13 RX ORDER — INSULIN DEGLUDEC 200 U/ML
INJECTION, SOLUTION SUBCUTANEOUS
Qty: 3 ML | Refills: 3 | Status: SHIPPED | OUTPATIENT
Start: 2019-01-13 | End: 2019-01-14 | Stop reason: SDUPTHER

## 2019-01-14 ENCOUNTER — PATIENT MESSAGE (OUTPATIENT)
Dept: INTERNAL MEDICINE | Facility: CLINIC | Age: 39
End: 2019-01-14

## 2019-01-14 RX ORDER — INSULIN DEGLUDEC 200 U/ML
INJECTION, SOLUTION SUBCUTANEOUS
Qty: 3 ML | Refills: 3 | Status: SHIPPED | OUTPATIENT
Start: 2019-01-14 | End: 2020-01-27 | Stop reason: SDUPTHER

## 2019-02-05 ENCOUNTER — CLINICAL SUPPORT (OUTPATIENT)
Dept: DIABETES | Facility: CLINIC | Age: 39
End: 2019-02-05
Payer: COMMERCIAL

## 2019-02-05 DIAGNOSIS — Z79.4 TYPE 2 DIABETES MELLITUS WITHOUT COMPLICATION, WITH LONG-TERM CURRENT USE OF INSULIN: ICD-10-CM

## 2019-02-05 DIAGNOSIS — E11.9 TYPE 2 DIABETES MELLITUS WITHOUT COMPLICATION, WITH LONG-TERM CURRENT USE OF INSULIN: ICD-10-CM

## 2019-02-05 PROCEDURE — G0108 PR DIAB MANAGE TRN  PER INDIV: ICD-10-PCS | Mod: S$GLB,,, | Performed by: INTERNAL MEDICINE

## 2019-02-05 PROCEDURE — 99999 PR PBB SHADOW E&M-EST. PATIENT-LVL III: ICD-10-PCS | Mod: PBBFAC,,,

## 2019-02-05 PROCEDURE — G0108 DIAB MANAGE TRN  PER INDIV: HCPCS | Mod: S$GLB,,, | Performed by: INTERNAL MEDICINE

## 2019-02-05 PROCEDURE — 99999 PR PBB SHADOW E&M-EST. PATIENT-LVL III: CPT | Mod: PBBFAC,,,

## 2019-02-05 NOTE — PROGRESS NOTES
Diabetes Education  Author: Noreen Kline RD, CDE  Date: 2/5/2019    Diabetes Care Management Summary  Diabetes Education Record Assessment/Progress: Initial     Diabetes Type  Diabetes Type : Type II    Diabetes History  Diabetes Diagnosis: >10 years  Current Treatment: Oral Medication, Injectable, Insulin(Has had to reduce insulin with changes in medication at last ENDO visit and changes in eating habits; taking Humalog 0-6 units AC and Tresiba 30 units HS; no more AM lows since reducing Tresiba from 40 to 30 units)    Health Maintenance was reviewed today with patient. Discussed with patient importance of routine eye exams, foot exams/foot care, blood work (i.e.: A1c, microalbumin, and lipid), dental visits, yearly flu vaccine, and pneumonia vaccine as indicated by PCP. Patient verbalized understanding.     Health Maintenance Topics with due status: Not Due       Topic Last Completion Date    Foot Exam 07/31/2018    Eye Exam 07/31/2018    Lipid Panel 01/09/2019    Hemoglobin A1c 01/09/2019     Health Maintenance Due   Topic Date Due    TETANUS VACCINE  04/22/1999    Influenza Vaccine  08/01/2018       Nutrition  Meal Plan 24 Hour Recall - Breakfast: (2 eggs (hardboiled), kenny, small source of CHO if less than 100; no insulin with no carbs; 4-6 units if eats CHO)  Meal Plan 24 Hour Recall - Lunch: ([place] - takes 0-4 units depending on BG before meal)  Meal Plan 24 Hour Recall - Dinner: (Meats, veggies, small source of CHO - may take about 4 units depending on BG before meal)  Meal Plan 24 Hour Recall - Snack: (Celery and PB)    Monitoring   Self Monitoring : (Using the Elvin; BG average 99)  Blood Glucose Logs: No  Do you use a personal continuous glucose monitor?: Yes  What kind of glucose monitor do you use?: Freestyle Elvin Flash  In the last month, how often have you had a low blood sugar reaction?: (Since medication adjustment - initially was having 3-4 lows a week; now down to about once a week)  What  are your symptoms of low blood sugar?: (Shaky, cold sweats)  How do you treat low blood sugar?: (Honey)  Can you tell when your blood sugar is too high?: yes  How do you treat high blood sugar?: (Has not been having BGs over 150 lately so not having to do anything - in the past, may drink more water or would give extra insulin)    Exercise   Exercise Type: none    Current Diabetes Treatment   Current Treatment: Oral Medication, Injectable, Insulin(Has had to reduce insulin with changes in medication at last ENDO visit and changes in eating habits; taking Humalog 0-6 units AC and Tresiba 30 units HS; no more AM lows since reducing Tresiba from 40 to 30 units)    Social History  Preferred Learning Method: Face to Face  Primary Support: Self  Smoking Status: Never a Smoker  Alcohol Use: Never    DDS-2 Score  ( > 3 = SIGNIFICANT DISTRESS): 1    Barriers to Change  Barriers to Change: None    Readiness to Learn   Readiness to Learn : Acceptance    Cultural Influences  Cultural Influences: No    Diabetes Education Assessment/Progress  Diabetes Disease Process (diabetes disease process and treatment options): Demonstrates Understanding/Competency(verbalizes/demonstrates), Discussion, Individual Session, Written Materials Provided  Nutrition (Incorporating nutritional management into one's lifestyle): Demonstrates Understanding/Competency (verbalizes/demonstrates), Discussion, Individual Session, Written Materials Provided  Physical Activity (incorporating physical activity into one's lifestyle): Instructed, Discussion, Individual Session, Written Materials Provided(Patient plans to start soon - goal is 3 days a week)  Medications (states correct name, dose, onset, peak, duration, side effects & timing of meds): Demonstrates Understanding/Competency(verbalizes/demonstrates), Discussion, Individual Session(Reviewed medication regimen - seems to have a good understanding of adjusting his insulin based on his CHO  intake)  Monitoring (monitoring blood glucose/other parameters & using results): Demonstrates Understanding/Competency (verbalizes/demonstrates)(Elvin has really helped in better managing his diabetes)  Acute Complications (preventing, detecting, and treating acute complications): Demonstrates Understanding/Competency (verbalizes/demonstrates)  Chronic Complications (preventing, detecting, and treating chronic complications): Demonstrates Understanding/Competency (verbalizes/demonstrates)  Clinical (diabetes, other pertinent medical history, and relevant comorbidities reviewed during visit): Demonstrates Understanding/Competency (verbalizes/demonstrates)  Cognitive (knowledge of self-management skills, functional health literacy): Demonstrates Understanding/Competency (verbalizes/demonstrates)  Psychosocial (emotional response to diabetes): Demonstrates Understanding/Competency (verbalizes/demonstrates)  Diabetes Distress and Support Systems: Demonstrates Understanding/Competency (verbalizes/demonstrates)  Behavioral (readiness for change, lifestyle practices, self-care behaviors): Demonstrates Understanding/Competency (verbalizes/demonstrates)    Goals  Patient has selected/evaluated goals during today's session: Yes, selected  Physical Activity: Set(Increase physical activity)    Diabetes Care Plan/Intervention  Education Plan/Intervention: Individual Follow-Up DSMT    Today's Self-Management Care Plan was developed with the patient's input and is based on barriers identified during today's assessment.    The long and short-term goals in the care plan were written with the patient/caregiver's input. The patient has agreed to work toward these goals to improve his overall diabetes control.      The patient received a copy of today's self-management plan and verbalized understanding of the care plan, goals, and all of today's instructions.      The patient was encouraged to communicate with his physician and care  team regarding his condition(s) and treatment.  I provided the patient with my contact information today and encouraged him to contact me via phone or patient portal as needed.     Education Units of Time   Time Spent: 45 min

## 2019-02-11 ENCOUNTER — PATIENT MESSAGE (OUTPATIENT)
Dept: INTERNAL MEDICINE | Facility: CLINIC | Age: 39
End: 2019-02-11

## 2019-02-11 RX ORDER — ETODOLAC 200 MG/1
200 CAPSULE ORAL DAILY PRN
Qty: 30 CAPSULE | Refills: 11 | Status: SHIPPED | OUTPATIENT
Start: 2019-02-11

## 2019-02-11 RX ORDER — BACLOFEN 20 MG/1
20 TABLET ORAL DAILY PRN
Qty: 30 TABLET | Refills: 11 | Status: SHIPPED | OUTPATIENT
Start: 2019-02-11 | End: 2019-06-26

## 2019-02-19 ENCOUNTER — PATIENT MESSAGE (OUTPATIENT)
Dept: INTERNAL MEDICINE | Facility: CLINIC | Age: 39
End: 2019-02-19

## 2019-02-20 ENCOUNTER — OFFICE VISIT (OUTPATIENT)
Dept: INTERNAL MEDICINE | Facility: CLINIC | Age: 39
End: 2019-02-20
Payer: COMMERCIAL

## 2019-02-20 VITALS
SYSTOLIC BLOOD PRESSURE: 130 MMHG | TEMPERATURE: 99 F | WEIGHT: 242.5 LBS | DIASTOLIC BLOOD PRESSURE: 80 MMHG | RESPIRATION RATE: 18 BRPM | BODY MASS INDEX: 31.12 KG/M2 | HEART RATE: 97 BPM | HEIGHT: 74 IN

## 2019-02-20 DIAGNOSIS — J20.8 ACUTE BACTERIAL BRONCHITIS: Primary | ICD-10-CM

## 2019-02-20 DIAGNOSIS — B96.89 ACUTE BACTERIAL BRONCHITIS: Primary | ICD-10-CM

## 2019-02-20 PROCEDURE — 99213 OFFICE O/P EST LOW 20 MIN: CPT | Mod: 25,S$GLB,, | Performed by: FAMILY MEDICINE

## 2019-02-20 PROCEDURE — 96372 PR INJECTION,THERAP/PROPH/DIAG2ST, IM OR SUBCUT: ICD-10-PCS | Mod: S$GLB,,, | Performed by: FAMILY MEDICINE

## 2019-02-20 PROCEDURE — 99213 PR OFFICE/OUTPT VISIT, EST, LEVL III, 20-29 MIN: ICD-10-PCS | Mod: 25,S$GLB,, | Performed by: FAMILY MEDICINE

## 2019-02-20 PROCEDURE — 3075F PR MOST RECENT SYSTOLIC BLOOD PRESS GE 130-139MM HG: ICD-10-PCS | Mod: CPTII,S$GLB,, | Performed by: FAMILY MEDICINE

## 2019-02-20 PROCEDURE — 99999 PR PBB SHADOW E&M-EST. PATIENT-LVL III: ICD-10-PCS | Mod: PBBFAC,,, | Performed by: FAMILY MEDICINE

## 2019-02-20 PROCEDURE — 3075F SYST BP GE 130 - 139MM HG: CPT | Mod: CPTII,S$GLB,, | Performed by: FAMILY MEDICINE

## 2019-02-20 PROCEDURE — 96372 THER/PROPH/DIAG INJ SC/IM: CPT | Mod: S$GLB,,, | Performed by: FAMILY MEDICINE

## 2019-02-20 PROCEDURE — 3079F DIAST BP 80-89 MM HG: CPT | Mod: CPTII,S$GLB,, | Performed by: FAMILY MEDICINE

## 2019-02-20 PROCEDURE — 3008F PR BODY MASS INDEX (BMI) DOCUMENTED: ICD-10-PCS | Mod: CPTII,S$GLB,, | Performed by: FAMILY MEDICINE

## 2019-02-20 PROCEDURE — 3008F BODY MASS INDEX DOCD: CPT | Mod: CPTII,S$GLB,, | Performed by: FAMILY MEDICINE

## 2019-02-20 PROCEDURE — 3079F PR MOST RECENT DIASTOLIC BLOOD PRESSURE 80-89 MM HG: ICD-10-PCS | Mod: CPTII,S$GLB,, | Performed by: FAMILY MEDICINE

## 2019-02-20 PROCEDURE — 99999 PR PBB SHADOW E&M-EST. PATIENT-LVL III: CPT | Mod: PBBFAC,,, | Performed by: FAMILY MEDICINE

## 2019-02-20 RX ORDER — ALBUTEROL SULFATE 90 UG/1
2 AEROSOL, METERED RESPIRATORY (INHALATION) EVERY 6 HOURS PRN
Qty: 18 G | Refills: 0 | Status: SHIPPED | OUTPATIENT
Start: 2019-02-20 | End: 2019-05-23

## 2019-02-20 RX ORDER — CEFTRIAXONE 1 G/1
1 INJECTION, POWDER, FOR SOLUTION INTRAMUSCULAR; INTRAVENOUS
Status: COMPLETED | OUTPATIENT
Start: 2019-02-20 | End: 2019-02-20

## 2019-02-20 RX ORDER — PROMETHAZINE HYDROCHLORIDE AND CODEINE PHOSPHATE 6.25; 1 MG/5ML; MG/5ML
5 SOLUTION ORAL NIGHTLY PRN
Qty: 240 ML | Refills: 0 | Status: SHIPPED | OUTPATIENT
Start: 2019-02-20 | End: 2019-03-02

## 2019-02-20 RX ORDER — AZITHROMYCIN 250 MG/1
TABLET, FILM COATED ORAL
Qty: 6 TABLET | Refills: 0 | Status: SHIPPED | OUTPATIENT
Start: 2019-02-20 | End: 2019-05-23

## 2019-02-20 RX ADMIN — CEFTRIAXONE 1 G: 1 INJECTION, POWDER, FOR SOLUTION INTRAMUSCULAR; INTRAVENOUS at 10:02

## 2019-02-21 NOTE — PROGRESS NOTES
Subjective:   Patient ID: Chip Arora is a 38 y.o. male.    Chief Complaint: Cough (green mucus); Fever (low grade); Generalized Body Aches; and Sore Throat (after coughing)      Cough   This is a new problem. The current episode started 1 to 4 weeks ago. The problem has been gradually worsening. The cough is productive of brown sputum and productive of purulent sputum. Associated symptoms include chest pain, chills, ear congestion, ear pain, a fever and headaches. Pertinent negatives include no postnasal drip, rash, rhinorrhea, sore throat, shortness of breath or wheezing. Nothing aggravates the symptoms. He has tried nothing for the symptoms.       Patient queried and denies any further complaints.        ALLERGIES AND MEDICATIONS: updated and reviewed.  Review of patient's allergies indicates:  No Known Allergies    Current Outpatient Medications:     baclofen (LIORESAL) 20 MG tablet, Take 1 tablet (20 mg total) by mouth daily as needed (muscle spasm)., Disp: 30 tablet, Rfl: 11    busPIRone (BUSPAR) 7.5 MG tablet, Take 1 tablet (7.5 mg total) by mouth 3 (three) times daily as needed (anxiety)., Disp: 90 tablet, Rfl: 11    dextroamphetamine-amphetamine 10 mg Tab, Take 1 tablet (10 mg total) by mouth 2 (two) times daily. (Patient taking differently: Take 10 mg by mouth as needed. ), Disp: 60 tablet, Rfl: 0    dulaglutide (TRULICITY) 1.5 mg/0.5 mL PnIj, Inject 1.5 mg into the skin every 7 days., Disp: 4 Syringe, Rfl: 5    empagliflozin (JARDIANCE) 10 mg Tab, Take 10 mg by mouth once daily., Disp: 30 tablet, Rfl: 5    esomeprazole (NEXIUM) 40 MG capsule, Take 40 mg by mouth before breakfast. , Disp: , Rfl:     etodolac (LODINE) 200 MG Cap, Take 1 capsule (200 mg total) by mouth daily as needed (pain)., Disp: 30 capsule, Rfl: 11    flash glucose scanning reader (FREESTYLE VIRGIL 14 DAY READER) Misc, Use as directed, Disp: 1 each, Rfl: 0    flash glucose sensor (FREESTYLE VIRGIL 14 DAY SENSOR) Kit, Change every  14 days, Disp: 2 kit, Rfl: 6    HUMALOG KWIKPEN 200 unit/mL (3 mL) InPn, 12 units with breakfast, 14-16 units with lunch and dinner, Disp: , Rfl:     metformin (GLUCOPHAGE XR) 500 MG 24 hr tablet, Take 500 mg by mouth daily with breakfast., Disp: , Rfl:     olmesartan (BENICAR) 20 MG tablet, Take 1 tablet (20 mg total) by mouth once daily., Disp: 60 tablet, Rfl: 3    sildenafil, antihypertensive, (REVATIO) 20 mg Tab, Take 1 tablet (20 mg total) by mouth As instructed. Take 2-5 tablets as needed., Disp: 100 tablet, Rfl: 11    TRESIBA FLEXTOUCH U-200 200 unit/mL (3 mL) InPn, INJECT 40 UNITS SUBCUTANEOUSLY AT BEDTIME, Disp: 3 mL, Rfl: 3    albuterol (PROVENTIL/VENTOLIN HFA) 90 mcg/actuation inhaler, Inhale 2 puffs into the lungs every 6 (six) hours as needed for Wheezing. Rescue, Disp: 18 g, Rfl: 0    atorvastatin (LIPITOR) 20 MG tablet, Take 1 tablet (20 mg total) by mouth every evening., Disp: 60 tablet, Rfl: 3    azelastine (ASTELIN) 137 mcg (0.1 %) nasal spray, 1 spray (137 mcg total) by Nasal route once daily., Disp: 30 mL, Rfl: 0    azithromycin (Z-CLARISSA) 250 MG tablet, Take 2 tablets by mouth on day 1; Take 1 tablet by mouth on days 2-5, Disp: 6 tablet, Rfl: 0    levocetirizine (XYZAL) 5 MG tablet, Take 1 tablet (5 mg total) by mouth every evening., Disp: 30 tablet, Rfl: 11    promethazine-codeine 6.25-10 mg/5 ml (PHENERGAN WITH CODEINE) 6.25-10 mg/5 mL syrup, Take 5 mLs by mouth nightly as needed for Cough. Do not take and drive/ work, Disp: 240 mL, Rfl: 0    Review of Systems   Constitutional: Positive for chills and fever. Negative for activity change, appetite change, diaphoresis, fatigue and unexpected weight change.   HENT: Positive for ear pain. Negative for congestion, ear discharge, postnasal drip, rhinorrhea, sneezing and sore throat.    Eyes: Negative for photophobia and discharge.   Respiratory: Positive for cough. Negative for chest tightness, shortness of breath and wheezing.   "  Cardiovascular: Positive for chest pain. Negative for palpitations.   Gastrointestinal: Negative for abdominal distention, abdominal pain, diarrhea, nausea and vomiting.   Genitourinary: Negative for dysuria.   Musculoskeletal: Negative for arthralgias and neck pain.   Skin: Negative for rash.   Neurological: Positive for headaches.       Objective:     Vitals:    02/20/19 1025   BP: 130/80   Pulse: 97   Resp: 18   Temp: 98.8 °F (37.1 °C)   TempSrc: Oral   Weight: 110 kg (242 lb 8.1 oz)   Height: 6' 2" (1.88 m)   PainSc:   3     Body mass index is 31.14 kg/m².    Physical Exam   Constitutional: He appears well-developed and well-nourished.   HENT:   Head: Normocephalic and atraumatic.   Right Ear: Hearing, tympanic membrane, external ear and ear canal normal. No drainage or swelling. No decreased hearing is noted.   Left Ear: Hearing, tympanic membrane, external ear and ear canal normal. No drainage or swelling. No decreased hearing is noted.   Nose: Nose normal. No rhinorrhea.   Mouth/Throat: Posterior oropharyngeal erythema present. No oropharyngeal exudate or posterior oropharyngeal edema.   Eyes: Conjunctivae, EOM and lids are normal. Pupils are equal, round, and reactive to light. Right eye exhibits no discharge and no exudate. Left eye exhibits no discharge and no exudate. Right conjunctiva is not injected. Left conjunctiva is not injected.   Neck: Trachea normal and full passive range of motion without pain. Normal carotid pulses, no hepatojugular reflux and no JVD present. Carotid bruit is not present. No neck rigidity. No edema and no erythema present. No thyroid mass and no thyromegaly present.   Cardiovascular: Normal rate, regular rhythm and normal heart sounds.   Pulmonary/Chest: Effort normal. No respiratory distress.   Abdominal: Soft. Normal appearance and bowel sounds are normal. There is no tenderness. There is negative Acuna's sign.   Lymphadenopathy:     He has no cervical adenopathy. "   Neurological: He is alert.   Skin: Skin is warm and dry.   Psychiatric: He has a normal mood and affect. His speech is normal and behavior is normal.   Nursing note and vitals reviewed.      Assessment and Plan:   Chip was seen today for cough, fever, generalized body aches and sore throat.    Diagnoses and all orders for this visit:    Acute bacterial bronchitis    Other orders  -     promethazine-codeine 6.25-10 mg/5 ml (PHENERGAN WITH CODEINE) 6.25-10 mg/5 mL syrup; Take 5 mLs by mouth nightly as needed for Cough. Do not take and drive/ work  -     albuterol (PROVENTIL/VENTOLIN HFA) 90 mcg/actuation inhaler; Inhale 2 puffs into the lungs every 6 (six) hours as needed for Wheezing. Rescue  -     cefTRIAXone injection 1 g  -     azithromycin (Z-CLARISSA) 250 MG tablet; Take 2 tablets by mouth on day 1; Take 1 tablet by mouth on days 2-5    Hydrate, rest, OTC Mucinex Expectorant as directed, Nasal saline as needed.  OTC Zyrtec as directed.      Follow-up in about 2 weeks (around 3/6/2019), or if symptoms worsen or fail to improve.    THIS NOTE WILL BE SHARED WITH THE PATIENT.

## 2019-03-18 ENCOUNTER — PATIENT MESSAGE (OUTPATIENT)
Dept: INTERNAL MEDICINE | Facility: CLINIC | Age: 39
End: 2019-03-18

## 2019-03-19 RX ORDER — METFORMIN HYDROCHLORIDE 500 MG/1
500 TABLET, EXTENDED RELEASE ORAL
Qty: 90 TABLET | Refills: 3 | Status: SHIPPED | OUTPATIENT
Start: 2019-03-19 | End: 2020-03-13

## 2019-04-19 ENCOUNTER — PATIENT MESSAGE (OUTPATIENT)
Dept: ENDOCRINOLOGY | Facility: CLINIC | Age: 39
End: 2019-04-19

## 2019-04-22 ENCOUNTER — PATIENT MESSAGE (OUTPATIENT)
Dept: INTERNAL MEDICINE | Facility: CLINIC | Age: 39
End: 2019-04-22

## 2019-04-22 ENCOUNTER — PATIENT MESSAGE (OUTPATIENT)
Dept: ENDOCRINOLOGY | Facility: CLINIC | Age: 39
End: 2019-04-22

## 2019-04-22 DIAGNOSIS — E11.9 TYPE 2 DIABETES MELLITUS WITHOUT COMPLICATION, WITH LONG-TERM CURRENT USE OF INSULIN: ICD-10-CM

## 2019-04-22 DIAGNOSIS — Z79.4 TYPE 2 DIABETES MELLITUS WITHOUT COMPLICATION, WITH LONG-TERM CURRENT USE OF INSULIN: ICD-10-CM

## 2019-05-09 NOTE — TELEPHONE ENCOUNTER
Pt requesting urology referral for ED, psych referral for anxiety/ depression. Pt would also like to hold off on annual until your return from maternity leave. Annual due 4/14/18, last evaluated 1/8/18. If ok to hold off, can we run 3 month labs?   
Referral sent to [facility] to schedule pt.     
Referral to urology and psych placed  Ok to wait on annual; do labs; make sure he is following up with endocrine as well in the meantime  
0

## 2019-05-17 ENCOUNTER — LAB VISIT (OUTPATIENT)
Dept: LAB | Facility: HOSPITAL | Age: 39
End: 2019-05-17
Attending: STUDENT IN AN ORGANIZED HEALTH CARE EDUCATION/TRAINING PROGRAM
Payer: COMMERCIAL

## 2019-05-17 DIAGNOSIS — E11.9 TYPE 2 DIABETES MELLITUS WITHOUT COMPLICATION, WITH LONG-TERM CURRENT USE OF INSULIN: ICD-10-CM

## 2019-05-17 DIAGNOSIS — Z79.4 TYPE 2 DIABETES MELLITUS WITHOUT COMPLICATION, WITH LONG-TERM CURRENT USE OF INSULIN: ICD-10-CM

## 2019-05-17 LAB
ESTIMATED AVG GLUCOSE: 166 MG/DL (ref 68–131)
HBA1C MFR BLD HPLC: 7.4 % (ref 4–5.6)

## 2019-05-17 PROCEDURE — 36415 COLL VENOUS BLD VENIPUNCTURE: CPT | Mod: PO

## 2019-05-17 PROCEDURE — 83036 HEMOGLOBIN GLYCOSYLATED A1C: CPT

## 2019-05-20 ENCOUNTER — PATIENT MESSAGE (OUTPATIENT)
Dept: ENDOCRINOLOGY | Facility: CLINIC | Age: 39
End: 2019-05-20

## 2019-05-23 ENCOUNTER — PATIENT MESSAGE (OUTPATIENT)
Dept: ENDOCRINOLOGY | Facility: CLINIC | Age: 39
End: 2019-05-23

## 2019-05-23 ENCOUNTER — OFFICE VISIT (OUTPATIENT)
Dept: ENDOCRINOLOGY | Facility: CLINIC | Age: 39
End: 2019-05-23
Payer: COMMERCIAL

## 2019-05-23 VITALS
DIASTOLIC BLOOD PRESSURE: 70 MMHG | BODY MASS INDEX: 31.32 KG/M2 | HEIGHT: 74 IN | SYSTOLIC BLOOD PRESSURE: 110 MMHG | WEIGHT: 244.06 LBS | RESPIRATION RATE: 16 BRPM

## 2019-05-23 DIAGNOSIS — E78.2 MIXED DYSLIPIDEMIA: ICD-10-CM

## 2019-05-23 DIAGNOSIS — E11.3393 MODERATE NONPROLIFERATIVE DIABETIC RETINOPATHY OF BOTH EYES WITHOUT MACULAR EDEMA ASSOCIATED WITH TYPE 2 DIABETES MELLITUS: ICD-10-CM

## 2019-05-23 DIAGNOSIS — E11.9 TYPE 2 DIABETES MELLITUS WITHOUT COMPLICATION, WITH LONG-TERM CURRENT USE OF INSULIN: ICD-10-CM

## 2019-05-23 DIAGNOSIS — Z79.4 TYPE 2 DIABETES MELLITUS WITHOUT COMPLICATION, WITH LONG-TERM CURRENT USE OF INSULIN: ICD-10-CM

## 2019-05-23 DIAGNOSIS — Z79.4 TYPE 2 DIABETES MELLITUS WITH MICROALBUMINURIA, WITH LONG-TERM CURRENT USE OF INSULIN: Primary | ICD-10-CM

## 2019-05-23 DIAGNOSIS — E11.29 TYPE 2 DIABETES MELLITUS WITH MICROALBUMINURIA, WITH LONG-TERM CURRENT USE OF INSULIN: Primary | ICD-10-CM

## 2019-05-23 DIAGNOSIS — R80.9 TYPE 2 DIABETES MELLITUS WITH MICROALBUMINURIA, WITH LONG-TERM CURRENT USE OF INSULIN: Primary | ICD-10-CM

## 2019-05-23 PROCEDURE — 3008F BODY MASS INDEX DOCD: CPT | Mod: CPTII,S$GLB,, | Performed by: STUDENT IN AN ORGANIZED HEALTH CARE EDUCATION/TRAINING PROGRAM

## 2019-05-23 PROCEDURE — 3074F SYST BP LT 130 MM HG: CPT | Mod: CPTII,S$GLB,, | Performed by: STUDENT IN AN ORGANIZED HEALTH CARE EDUCATION/TRAINING PROGRAM

## 2019-05-23 PROCEDURE — 99214 PR OFFICE/OUTPT VISIT, EST, LEVL IV, 30-39 MIN: ICD-10-PCS | Mod: S$GLB,,, | Performed by: STUDENT IN AN ORGANIZED HEALTH CARE EDUCATION/TRAINING PROGRAM

## 2019-05-23 PROCEDURE — 99214 OFFICE O/P EST MOD 30 MIN: CPT | Mod: S$GLB,,, | Performed by: STUDENT IN AN ORGANIZED HEALTH CARE EDUCATION/TRAINING PROGRAM

## 2019-05-23 PROCEDURE — 3008F PR BODY MASS INDEX (BMI) DOCUMENTED: ICD-10-PCS | Mod: CPTII,S$GLB,, | Performed by: STUDENT IN AN ORGANIZED HEALTH CARE EDUCATION/TRAINING PROGRAM

## 2019-05-23 PROCEDURE — 3078F PR MOST RECENT DIASTOLIC BLOOD PRESSURE < 80 MM HG: ICD-10-PCS | Mod: CPTII,S$GLB,, | Performed by: STUDENT IN AN ORGANIZED HEALTH CARE EDUCATION/TRAINING PROGRAM

## 2019-05-23 PROCEDURE — 3074F PR MOST RECENT SYSTOLIC BLOOD PRESSURE < 130 MM HG: ICD-10-PCS | Mod: CPTII,S$GLB,, | Performed by: STUDENT IN AN ORGANIZED HEALTH CARE EDUCATION/TRAINING PROGRAM

## 2019-05-23 PROCEDURE — 3045F PR MOST RECENT HEMOGLOBIN A1C LEVEL 7.0-9.0%: ICD-10-PCS | Mod: CPTII,S$GLB,, | Performed by: STUDENT IN AN ORGANIZED HEALTH CARE EDUCATION/TRAINING PROGRAM

## 2019-05-23 PROCEDURE — 99999 PR PBB SHADOW E&M-EST. PATIENT-LVL V: ICD-10-PCS | Mod: PBBFAC,,, | Performed by: STUDENT IN AN ORGANIZED HEALTH CARE EDUCATION/TRAINING PROGRAM

## 2019-05-23 PROCEDURE — 3078F DIAST BP <80 MM HG: CPT | Mod: CPTII,S$GLB,, | Performed by: STUDENT IN AN ORGANIZED HEALTH CARE EDUCATION/TRAINING PROGRAM

## 2019-05-23 PROCEDURE — 3045F PR MOST RECENT HEMOGLOBIN A1C LEVEL 7.0-9.0%: CPT | Mod: CPTII,S$GLB,, | Performed by: STUDENT IN AN ORGANIZED HEALTH CARE EDUCATION/TRAINING PROGRAM

## 2019-05-23 PROCEDURE — 99999 PR PBB SHADOW E&M-EST. PATIENT-LVL V: CPT | Mod: PBBFAC,,, | Performed by: STUDENT IN AN ORGANIZED HEALTH CARE EDUCATION/TRAINING PROGRAM

## 2019-05-23 NOTE — PROGRESS NOTES
I have reviewed and concur with Dr. Reynoso's history, physical, assessment, and plan.  I have personally interviewed and examined the patient.    Anitra Orta MD

## 2019-05-23 NOTE — ASSESSMENT & PLAN NOTE
Prescribed Atorvastatin but pt would like to try taking fish oil first and see if there is an improvement in his lipid profile.     Will repeat lipid profile with next set of labs.

## 2019-05-23 NOTE — ASSESSMENT & PLAN NOTE
Pt has well controlled type 2 DM. HbA1c has improved on current medications.     Goal HbA1c: 7%    As per the BG readings on Elvin, his BG levels tends to rise after lunch.     -Recommend increasing the dose of Humalog with lunch ~8 units.   -Continue Humalog ~5 units with breakfast and dinner  -Advised him to count carbs (ICR- 1:8)  -Continue Tresiba 36 units, hs  -Continue Metformin  mg daily  -Continue Empagliflozin 10 mg daily  -Continue Trulicity to 1.5 mg weekly    -Pt is hesitant to increase the dose of Metformin (had GI side effects in the past) or Empagliflozin (afraid of dehydration).    -Reports lightheadedness/ dizziness after eating breakfast every morning. Unclear if it is related to low BP. No episodes of hypoglycemia in the morning as per Elvin.  -Advised to check BP when symptomatic  -Recommended to decrease the dose of Olmesartan to 10 mg, nightly.    -Re-check HbA1c in 3 months  -F/U in 6 months

## 2019-05-23 NOTE — PROGRESS NOTES
Subjective:      Patient ID: Chip Arora is a 39 y.o. male.    Chief Complaint:  Type 2 DM    History of Present Illness  A 37 yo man with a diagnosis of type 2 DM, is here to establish care.   Medical history includes depression, ADHD, GERD.     Was initially dx in . Reports having symptoms of polyuria at that time. At the time of dx, he was started on Metformin. Was also on Rosiglitazone for a short period (about 8 months). Insulin was added in .   Reports having diarrhea when the dose of Metformin was increased.    Current medications:   Metformin  mg daily  Empagliflozin 10 mg daily  Trulicity to 1.5 mg weekly  Tresiba 36 units, hs   Humalog 5-6 units with meals. Takes Humalog based on what he eats (eg. 3 units for 1 cup of rice) plus 1 unit for every 10 mg/dl glucose level >100.      Misses insulin (Humalog) about once or twice a day because of low BG level.  Didn't take Jardiance and Trulicity for about two weeks in April due to insurance issues.     Recent HbA1c: 7.4%    BG Readings: uses Elvin. Reviewed BG levels  Fasting Bs      Any hypoglycemic episodes:   Reports couple of hypoglycemic episodes overnight (about 30 mg/dl).       Has glucagon kit at home: No    Diet & Exercise:   B: Eggs (2), kenny, and coffee  L: [employer]-- eats between noon-3PM  D: Meat and vegetables. Sometimes potatoes. Eats between 7 PM to midnight  Doesn't usually snack during the day.    Exercises about once a week for about 45 min.    ADA STANDARDS of CARE:        ACE inhibitor of angiotensin II receptor blocker: On Olmesartan        Statin drug: Currently not taking statin        Low dose ASA: No        Eye exam within last year: about 1 month ago        Microalbumin: Aug, 2018        Lipid profile: 2019    Diabetes complications: diabetic retinopathy (s/p laser therapy), neuropathy    Diabetes education: 2019    Denies personal history of thyroid cancer, pancreatitis, or DKA.     FH: mother has type  2 DM. No h/o thyroid cancer.    Pt also reports erectile dysfunction. Reports normal libido. Was seen by Urology in the past.       Review of Systems   Constitutional: Negative for unexpected weight change.   Eyes: Negative for visual disturbance.   Respiratory: Negative for shortness of breath.    Cardiovascular: Negative for chest pain.   Gastrointestinal: Negative for abdominal pain.   Genitourinary: Negative for urgency.   Musculoskeletal: Negative for arthralgias.   Skin: Negative for wound.   Neurological: Negative for headaches.   Hematological: Does not bruise/bleed easily.   Psychiatric/Behavioral: Negative for sleep disturbance.       Objective:   Physical Exam   Constitutional: He is oriented to person, place, and time. He appears well-developed and well-nourished.   HENT:   Head: Normocephalic and atraumatic.   Neck: Neck supple. No thyromegaly present.   Cardiovascular: Normal rate, regular rhythm and normal heart sounds.   Pulmonary/Chest: Effort normal. No respiratory distress.   Abdominal: Soft. There is no tenderness.   Feet:   Right Foot:   Protective Sensation: 6 sites tested. 6 sites sensed.   Skin Integrity: Negative for ulcer, blister or skin breakdown.   Left Foot:   Protective Sensation: 6 sites tested. 6 sites sensed.   Skin Integrity: Negative for ulcer, blister or skin breakdown.   Neurological: He is alert and oriented to person, place, and time.   Skin: Skin is warm. No rash noted.   No injection site reaction   Psychiatric: He has a normal mood and affect. His behavior is normal.       Lab Review:     Results for MARVEL SALAS (MRN [id number]) as of 5/23/2019 13:52   Ref. 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     Will repeat lipid profile with next set of labs.     Type 2 diabetes mellitus with microalbuminuria, with long-term current use of insulin  Pt has well controlled type 2 DM. HbA1c has improved on current medications.     Goal HbA1c: 7%    As per the BG readings on Elvin, his BG levels tends to rise after lunch.     -Recommend increasing the dose of Humalog with lunch ~8 units.   -Continue Humalog ~5 units with breakfast and dinner  -Advised him to count carbs (ICR- 1:8)  -Continue Tresiba 36 units, hs  -Continue Metformin  mg daily  -Continue Empagliflozin 10 mg daily  -Continue Trulicity to 1.5 mg weekly    -Pt is hesitant to increase the dose of Metformin (had GI side effects in the past) or Empagliflozin (afraid of dehydration).    -Reports lightheadedness/ dizziness after eating breakfast every morning. Unclear if it is related to low BP. No episodes of hypoglycemia in the morning as per Elvin.  -Advised to check BP when symptomatic  -Recommended to decrease the dose of Olmesartan to 10 mg, nightly.    -Re-check HbA1c in 3 months  -F/U in 6 months      Discussed with Dr. Orta

## 2019-05-23 NOTE — PATIENT INSTRUCTIONS
Increase dose of Humalog with lunch to 8 units.  Take Olmesartan 10 mg, nightly  Check your BP when symptomatic

## 2019-06-20 ENCOUNTER — OFFICE VISIT (OUTPATIENT)
Dept: PSYCHIATRY | Facility: CLINIC | Age: 39
End: 2019-06-20
Payer: COMMERCIAL

## 2019-06-20 DIAGNOSIS — F90.0 ATTENTION DEFICIT HYPERACTIVITY DISORDER (ADHD), PREDOMINANTLY INATTENTIVE TYPE: ICD-10-CM

## 2019-06-20 DIAGNOSIS — F32.0 CURRENT MILD EPISODE OF MAJOR DEPRESSIVE DISORDER, UNSPECIFIED WHETHER RECURRENT: Primary | ICD-10-CM

## 2019-06-20 DIAGNOSIS — F41.8 SITUATIONAL ANXIETY: ICD-10-CM

## 2019-06-20 PROCEDURE — 99999 PR PBB SHADOW E&M-EST. PATIENT-LVL II: CPT | Mod: PBBFAC,,, | Performed by: NURSE PRACTITIONER

## 2019-06-20 PROCEDURE — 99999 PR PBB SHADOW E&M-EST. PATIENT-LVL II: ICD-10-PCS | Mod: PBBFAC,,, | Performed by: NURSE PRACTITIONER

## 2019-06-20 PROCEDURE — 99213 PR OFFICE/OUTPT VISIT, EST, LEVL III, 20-29 MIN: ICD-10-PCS | Mod: S$GLB,,, | Performed by: NURSE PRACTITIONER

## 2019-06-20 PROCEDURE — 99213 OFFICE O/P EST LOW 20 MIN: CPT | Mod: S$GLB,,, | Performed by: NURSE PRACTITIONER

## 2019-06-20 RX ORDER — BUSPIRONE HYDROCHLORIDE 7.5 MG/1
7.5 TABLET ORAL 3 TIMES DAILY PRN
Qty: 270 TABLET | Refills: 3 | Status: SHIPPED | OUTPATIENT
Start: 2019-06-20 | End: 2019-07-12

## 2019-06-20 RX ORDER — BUPROPION HYDROCHLORIDE 150 MG/1
150 TABLET ORAL DAILY
Qty: 90 TABLET | Refills: 3 | Status: SHIPPED | OUTPATIENT
Start: 2019-06-20 | End: 2020-04-15 | Stop reason: SDUPTHER

## 2019-06-20 RX ORDER — DEXTROAMPHETAMINE SACCHARATE, AMPHETAMINE ASPARTATE, DEXTROAMPHETAMINE SULFATE AND AMPHETAMINE SULFATE 2.5; 2.5; 2.5; 2.5 MG/1; MG/1; MG/1; MG/1
10 TABLET ORAL 2 TIMES DAILY
Qty: 60 TABLET | Refills: 0 | Status: SHIPPED | OUTPATIENT
Start: 2019-06-20 | End: 2019-06-20 | Stop reason: SDUPTHER

## 2019-06-20 RX ORDER — DEXTROAMPHETAMINE SACCHARATE, AMPHETAMINE ASPARTATE, DEXTROAMPHETAMINE SULFATE AND AMPHETAMINE SULFATE 2.5; 2.5; 2.5; 2.5 MG/1; MG/1; MG/1; MG/1
10 TABLET ORAL 2 TIMES DAILY
Qty: 60 TABLET | Refills: 0 | Status: SHIPPED | OUTPATIENT
Start: 2019-07-20 | End: 2019-06-20 | Stop reason: SDUPTHER

## 2019-06-20 RX ORDER — DEXTROAMPHETAMINE SACCHARATE, AMPHETAMINE ASPARTATE, DEXTROAMPHETAMINE SULFATE AND AMPHETAMINE SULFATE 2.5; 2.5; 2.5; 2.5 MG/1; MG/1; MG/1; MG/1
10 TABLET ORAL 2 TIMES DAILY
Qty: 60 TABLET | Refills: 0 | Status: SHIPPED | OUTPATIENT
Start: 2019-08-20 | End: 2020-05-21

## 2019-06-20 NOTE — PROGRESS NOTES
Outpatient Psychiatry Follow-Up Visit (MD/NP)    6/20/2019    Clinical Status of Patient:  Outpatient (Ambulatory)    Chief Complaint:  Chip Arora is a 39 y.o. male who presents today for follow-up of anxiety and attention problems.  Met with patient.      Last visit was: 6/26/18. Chart and  reviewed.     Interval History and Content of Current Session:  Current Psychiatric Medications/changes  · Adderall 10 mg po BID ( 2 month Rx printed)  · Continue Buspar 7.5 mg po TID     Pt states that he is about to finish graduate program.  Also having some depression symptoms (anhedonia, lack of interest); no anxiety symptoms.  Hx of tiredness on Zoloft and Celexa.  Hx of good response on Wellbutrin.  Will restart Wellbutrin and increase to 300 mg after 30 days if symptoms persist.  Reports good response to Buspar and Adderall.  Denies SI/HI/AVH.     Psychotherapy:  · Target symptoms: depression, lack of focus  · Why chosen therapy is appropriate versus another modality: relevant to diagnosis  · Outcome monitoring methods: self-report  · Therapeutic intervention type: insight oriented psychotherapy  · Topics discussed/themes: building skills sets for symptom management, symptom recognition  · The patient's response to the intervention is accepting. The patient's progress toward treatment goals is good.   · Duration of intervention: 13 minutes.    Review of Systems   · PSYCHIATRIC: Pertinant items are noted in the narrative.  · CONSTITUTIONAL: No weight gain or loss.   · MUSCULOSKELETAL: No pain or stiffness of the joints.  · NEUROLOGIC: No weakness, sensory changes, seizures, confusion, memory loss, tremor or other abnormal movements.  · ENDOCRINE: No polydipsia or polyuria.  · INTEGUMENTARY: No rashes or lacerations.  · EYES: No exophthalmos, jaundice or blindness.  · ENT: No dizziness, tinnitus or hearing loss.  · RESPIRATORY: No shortness of breath.  · CARDIOVASCULAR: No tachycardia or chest  pain.  · GASTROINTESTINAL: No nausea, vomiting, pain, constipation or diarrhea.  · GENITOURINARY: No frequency, dysuria or sexual dysfunction.  · HEMATOLOGIC/LYMPHATIC: No excessive bleeding, prolonged or excessive bleeding after dental extraction/injury.    Past Medical, Family and Social History: The patient's past medical, family and social history have been reviewed and updated as appropriate within the electronic medical record - see encounter notes.    Compliance: yes    Side effects: None    Risk Parameters:  Patient reports no suicidal ideation  Patient reports no homicidal ideation  Patient reports no self-injurious behavior  Patient reports no violent behavior    Exam (detailed: at least 9 elements; comprehensive: all 15 elements)   Constitutional  Vitals:  Most recent vital signs, dated greater than 90 days prior to this appointment, were reviewed.   There were no vitals filed for this visit.     General:  unremarkable, age appropriate     Musculoskeletal  Muscle Strength/Tone:  no tremor, no tic   Gait & Station:  non-ataxic     Psychiatric  Speech:  no latency; no press   Mood & Affect:  euthymic  congruent and appropriate   Thought Process:  normal and logical   Associations:  intact   Thought Content:  normal, no suicidality, no homicidality, delusions, or paranoia   Insight:  intact   Judgement: behavior is adequate to circumstances   Orientation:  grossly intact   Memory: intact for content of interview   Language: grossly intact   Attention Span & Concentration:  able to focus   Fund of Knowledge:  intact and appropriate to age and level of education     Assessment and Diagnosis   Status/Progress: Based on the examination today, the patient's problem(s) is/are adequately but not ideally controlled.  New problems have been presented today (depression).   Co-morbidities and Lack of compliance are not complicating management of the primary condition.  There are no active rule-out diagnoses for this  patient at this time.     General Impression:       ICD-10-CM ICD-9-CM   1. Current mild episode of major depressive disorder, unspecified whether recurrent F32.0 296.21   2. Situational anxiety F41.8 300.09   3. Attention deficit hyperactivity disorder (ADHD), predominantly inattentive type F90.0 314.00       Intervention/Counseling/Treatment Plan   · Medication Management: The risks and benefits of medication were discussed with the patient.   · Start Wellbutrin  mg po daily.  May increase to 300 mg next visit.  · Continue Adderall 10 mg po BID ( 2 month Rx printed)  · Continue Buspar 7.5 mg po TID     Return to Clinic: 6 months    Risks, benefits, side effects and alternative treatments discussed with patient. Patient agrees with the current plan as documented.  Encouraged Patient to keep future appointments.  Take medications as prescribed and abstain from substance abuse.  Pt to present to ED for thoughts to harm himself or others

## 2019-06-24 NOTE — PROGRESS NOTES
Subjective:       Patient ID: Chip Arora is a 39 y.o. male.    Chief Complaint: Back Pain    Patient is a 39 y.o.male who presents today for back pain.  - ongoing for 2 months  - was getting into bed, heard a loud pop sound in the back  - L1-L2 area froze up  - pain has been intensifying since then  - tried stretching, low impact exercises have not helped  - pain does radiate to the legs at times  - he tried etodolac and muscle relaxer and ibuprofen; nothing is helping.   - hard to stand for more than 30 min at a time.  - now getting tightness to thoracic spine as well due to compensation  - 3-4 weeks after popping sound, if he lays on his back, he wakes up and cannot feel lower legs and feet.  Takes 10-15 minutes before numbness resolves. When sensation returns, he is having neuropathic pain in his toes.   - he has had to call into work a few times.   - he has baclofen at home. Tried robaxin as well with no relief.   - no bowel or bladder loss      Review of Systems   Constitutional: Negative for appetite change, chills and diaphoresis.   HENT: Negative for congestion, dental problem, drooling, ear discharge, ear pain, hearing loss, mouth sores and tinnitus.    Eyes: Negative for discharge, redness and itching.   Respiratory: Negative for chest tightness, shortness of breath and wheezing.    Cardiovascular: Negative for chest pain, palpitations and leg swelling.   Gastrointestinal: Negative for abdominal pain, constipation, diarrhea, nausea and vomiting.   Endocrine: Negative for cold intolerance and heat intolerance.   Genitourinary: Negative for decreased urine volume, difficulty urinating, flank pain and hematuria.   Musculoskeletal: Positive for back pain. Negative for arthralgias, gait problem and myalgias.   Skin: Negative for color change and rash.   Allergic/Immunologic: Negative for environmental allergies.   Neurological: Negative for dizziness, syncope and headaches.   Hematological: Negative for  adenopathy.   Psychiatric/Behavioral: Negative for agitation, behavioral problems, confusion and sleep disturbance.       Objective:      Physical Exam   Constitutional: He is oriented to person, place, and time. He appears well-developed and well-nourished. No distress.   HENT:   Head: Normocephalic and atraumatic.   Right Ear: External ear normal.   Left Ear: External ear normal.   Nose: Nose normal.   Mouth/Throat: Oropharynx is clear and moist. No oropharyngeal exudate.   Eyes: Pupils are equal, round, and reactive to light. Conjunctivae and EOM are normal. Right eye exhibits no discharge. Left eye exhibits no discharge. No scleral icterus.   Neck: Normal range of motion. Neck supple. No JVD present. No tracheal deviation present. No thyromegaly present.   Cardiovascular: Normal rate, regular rhythm, normal heart sounds and intact distal pulses. Exam reveals no gallop and no friction rub.   No murmur heard.  Pulmonary/Chest: Effort normal and breath sounds normal. No stridor. No respiratory distress. He has no wheezes. He has no rales. He exhibits no tenderness.   Abdominal: Soft. Bowel sounds are normal. He exhibits no distension. There is no tenderness. There is no rebound.   Musculoskeletal: He exhibits no edema.        Lumbar back: He exhibits decreased range of motion and tenderness.   Lymphadenopathy:     He has no cervical adenopathy.   Neurological: He is alert and oriented to person, place, and time. He has normal reflexes. No cranial nerve deficit.   Skin: Skin is warm and dry. No rash noted. He is not diaphoretic. No erythema.   Psychiatric: He has a normal mood and affect. His behavior is normal.   Nursing note and vitals reviewed.      Assessment and Plan:       1. Lumbar radiculopathy  - trial of gabapentin and flexeril prn  - Notify clinic if symptoms change, worsen or do not improve  - X-Ray Lumbar Spine Ap And Lateral; Future  - Ambulatory Referral to [facility]  - MRI Lumbar Spine W WO  Contrast; Future  - Ambulatory consult to [facility]          No follow-ups on file.

## 2019-06-26 ENCOUNTER — HOSPITAL ENCOUNTER (OUTPATIENT)
Dept: RADIOLOGY | Facility: HOSPITAL | Age: 39
Discharge: HOME | End: 2019-06-26
Attending: INTERNAL MEDICINE
Payer: COMMERCIAL

## 2019-06-26 ENCOUNTER — OFFICE VISIT (OUTPATIENT)
Dept: INTERNAL MEDICINE | Facility: CLINIC | Age: 39
End: 2019-06-26
Payer: COMMERCIAL

## 2019-06-26 VITALS
BODY MASS INDEX: 33.35 KG/M2 | HEIGHT: 72 IN | DIASTOLIC BLOOD PRESSURE: 81 MMHG | TEMPERATURE: 99 F | RESPIRATION RATE: 16 BRPM | WEIGHT: 246.25 LBS | SYSTOLIC BLOOD PRESSURE: 135 MMHG | HEART RATE: 82 BPM

## 2019-06-26 DIAGNOSIS — M54.16 LUMBAR RADICULOPATHY: ICD-10-CM

## 2019-06-26 DIAGNOSIS — M54.16 LUMBAR RADICULOPATHY: Primary | ICD-10-CM

## 2019-06-26 PROCEDURE — 72100 XR LUMBAR SPINE AP AND LATERAL: ICD-10-PCS | Mod: 26,,, | Performed by: RADIOLOGY

## 2019-06-26 PROCEDURE — 99214 PR OFFICE/OUTPT VISIT, EST, LEVL IV, 30-39 MIN: ICD-10-PCS | Mod: S$GLB,,, | Performed by: INTERNAL MEDICINE

## 2019-06-26 PROCEDURE — 3079F DIAST BP 80-89 MM HG: CPT | Mod: CPTII,S$GLB,, | Performed by: INTERNAL MEDICINE

## 2019-06-26 PROCEDURE — 99214 OFFICE O/P EST MOD 30 MIN: CPT | Mod: S$GLB,,, | Performed by: INTERNAL MEDICINE

## 2019-06-26 PROCEDURE — 3075F SYST BP GE 130 - 139MM HG: CPT | Mod: CPTII,S$GLB,, | Performed by: INTERNAL MEDICINE

## 2019-06-26 PROCEDURE — 72100 X-RAY EXAM L-S SPINE 2/3 VWS: CPT | Mod: TC,PO

## 2019-06-26 PROCEDURE — 3079F PR MOST RECENT DIASTOLIC BLOOD PRESSURE 80-89 MM HG: ICD-10-PCS | Mod: CPTII,S$GLB,, | Performed by: INTERNAL MEDICINE

## 2019-06-26 PROCEDURE — 99999 PR PBB SHADOW E&M-EST. PATIENT-LVL IV: ICD-10-PCS | Mod: PBBFAC,,, | Performed by: INTERNAL MEDICINE

## 2019-06-26 PROCEDURE — 72100 X-RAY EXAM L-S SPINE 2/3 VWS: CPT | Mod: 26,,, | Performed by: RADIOLOGY

## 2019-06-26 PROCEDURE — 3008F PR BODY MASS INDEX (BMI) DOCUMENTED: ICD-10-PCS | Mod: CPTII,S$GLB,, | Performed by: INTERNAL MEDICINE

## 2019-06-26 PROCEDURE — 3008F BODY MASS INDEX DOCD: CPT | Mod: CPTII,S$GLB,, | Performed by: INTERNAL MEDICINE

## 2019-06-26 PROCEDURE — 3075F PR MOST RECENT SYSTOLIC BLOOD PRESS GE 130-139MM HG: ICD-10-PCS | Mod: CPTII,S$GLB,, | Performed by: INTERNAL MEDICINE

## 2019-06-26 PROCEDURE — 99999 PR PBB SHADOW E&M-EST. PATIENT-LVL IV: CPT | Mod: PBBFAC,,, | Performed by: INTERNAL MEDICINE

## 2019-06-26 RX ORDER — CYCLOBENZAPRINE HCL 10 MG
10 TABLET ORAL 3 TIMES DAILY PRN
Qty: 30 TABLET | Refills: 0 | Status: SHIPPED | OUTPATIENT
Start: 2019-06-26 | End: 2019-09-24 | Stop reason: SDUPTHER

## 2019-06-26 RX ORDER — GABAPENTIN 300 MG/1
300 CAPSULE ORAL NIGHTLY
Qty: 30 CAPSULE | Refills: 11 | Status: SHIPPED | OUTPATIENT
Start: 2019-06-26 | End: 2019-07-12 | Stop reason: SDUPTHER

## 2019-07-01 ENCOUNTER — TELEPHONE (OUTPATIENT)
Dept: INTERNAL MEDICINE | Facility: CLINIC | Age: 39
End: 2019-07-01

## 2019-07-01 NOTE — TELEPHONE ENCOUNTER
----- Message from Delfina Cody sent at 7/1/2019 12:30 PM CDT -----  Contact: BRITTNEY/ Tito [phone]  ext [phone]  and fax # [phone]  You referred patient for an mri to [facility] and they need patient's last x-ray reports and last medical office notes. Please fax asap . Pt's mri is scheduled for this Wednesday.

## 2019-07-09 ENCOUNTER — PATIENT MESSAGE (OUTPATIENT)
Dept: INTERNAL MEDICINE | Facility: CLINIC | Age: 39
End: 2019-07-09

## 2019-07-10 NOTE — PROGRESS NOTES
Subjective:      Patient ID: Chip Arora is a 39 y.o. male.    Chief Complaint: Back Pain    Mr Arora is a 40 yo male sent in consultation by Dr. Gonsalves for evaluation of low back pain.  He has had back pain for the past 20 years, but worse the past 2 months.  He feels like he usually has a flare once a year, but this one is lasting longer and getting worse.  He feels like pain use to be minor, but more intense.  The pain is a dull lumbar pain and sharp sacral pain on right and the outside of the right leg pain and to top of the leg.  The leg pain and back pain is constant.  The pain is with any position for too long.  He has pain with lifting the leg up and getting out of car and out of bed.  He has a hard time bringing right leg out.  He feels like his right back gets tight and left back is not being used when pain is severe.  He does not have MRI report.  We do have report interpretation from PCP.  The pain will go to outsid of calf.  The pain is a pinch.  He does not feel like the pain goes to the foot. He is taking gabapentin 300mg po BID, he is taking etodolac bid, and flexeril at night.  He does feel like the meds help.  He is getting scheduled for healthy back.  He has done PT in the past.  He has been to chiropractor in the past.  Pain is 6/10 now, worst 9/10 in the morning without meds, best 4/10 after medicine atnight    X-ray lumbar 6/26/2019  Mild DJD.  The lumbosacral disc space is slightly narrowed.  The other disc spaces are well maintained.  No fracture, spondylolisthesis or bone destruction identified    MRI lumbar outside Dr. Gonsalves impression  L4-L5 central herniated disc that is displacing your S1 nerve root   L5-S1 central herniated disc   Annular fissure to L5-S1   Facet arthropathy or degenerative change   Foraminal stenosis       Past Medical History:  No date: Anxiety  No date: Bulge of lumbar disc without myelopathy  No date: Depression  No date: Diabetes mellitus, type 2      Comment:   sees endocrine; Dr. Hernandez at [facility]; states due to                anthrax inoculation in the   1/10/2019: Essential hypertension  No date: Hx of psychiatric care  No date: Patellofemoral dysfunction      Comment:  right  No date: Psychiatric problem  No date: Rotator cuff disorder      Comment:  right  No date: Therapy    Past Surgical History:  No date: WISDOM TOOTH EXTRACTION    Review of patient's family history indicates:  Problem: Diabetes      Relation: Mother          Age of Onset: (Not Specified)          Comment: mild  Problem: Cancer      Relation: Maternal Grandmother          Age of Onset: (Not Specified)          Comment: breast      Social History    Socioeconomic History      Marital status:       Spouse name: Not on file      Number of children: Not on file      Years of education: Not on file      Highest education level: Not on file    Occupational History      Occupation: [employer]    Social Needs      Financial resource strain: Not on file      Food insecurity:        Worry: Not on file        Inability: Not on file      Transportation needs:        Medical: Not on file        Non-medical: Not on file    Tobacco Use      Smoking status: Never Smoker      Smokeless tobacco: Never Used    Substance and Sexual Activity      Alcohol use: No      Drug use: No      Sexual activity: Yes        Partners: Female    Lifestyle      Physical activity:        Days per week: Not on file        Minutes per session: Not on file      Stress: Not on file    Relationships      Social connections:        Talks on phone: Not on file        Gets together: Not on file        Attends Yazdanism service: Not on file        Active member of club or organization: Not on file        Attends meetings of clubs or organizations: Not on file        Relationship status: Not on file    Other Topics      Concerns:        Patient feels they ought to cut down on drinking/drug use: Not Asked        Patient annoyed by  others criticizing their drinking/drug use: Not Asked        Patient has felt bad or guilty about drinking/drug use: Not Asked        Patient has had a drink/used drugs as an eye opener in the AM: Not Asked    Social History Narrative      ; wife is a RN at [employer]      Current Outpatient Medications:  atorvastatin (LIPITOR) 20 MG tablet, Take 1 tablet (20 mg total) by mouth every evening., Disp: 60 tablet, Rfl: 3  azelastine (ASTELIN) 137 mcg (0.1 %) nasal spray, 1 spray (137 mcg total) by Nasal route once daily., Disp: 30 mL, Rfl: 0  buPROPion (WELLBUTRIN XL) 150 MG TB24 tablet, Take 1 tablet (150 mg total) by mouth once daily., Disp: 90 tablet, Rfl: 3  busPIRone (BUSPAR) 7.5 MG tablet, Take 1 tablet (7.5 mg total) by mouth 3 (three) times daily as needed (anxiety)., Disp: 270 tablet, Rfl: 3  (START ON 8/20/2019) dextroamphetamine-amphetamine 10 mg Tab, Take 1 tablet (10 mg total) by mouth 2 (two) times daily., Disp: 60 tablet, Rfl: 0  dulaglutide (TRULICITY) 1.5 mg/0.5 mL PnIj, Inject 1.5 mg into the skin every 7 days., Disp: 12 Syringe, Rfl: 3  empagliflozin (JARDIANCE) 10 mg Tab, Take 10 mg by mouth once daily., Disp: 60 tablet, Rfl: 3  esomeprazole (NEXIUM) 40 MG capsule, Take 40 mg by mouth before breakfast. , Disp: , Rfl:   etodolac (LODINE) 200 MG Cap, Take 1 capsule (200 mg total) by mouth daily as needed (pain)., Disp: 30 capsule, Rfl: 11  flash glucose scanning reader (FREESTYLE VIRGIL 14 DAY READER) Misc, Use as directed, Disp: 1 each, Rfl: 0  flash glucose sensor (FREESTYLE VIRGIL 14 DAY SENSOR) Kit, Change every 14 days, Disp: 2 kit, Rfl: 6  gabapentin (NEURONTIN) 300 MG capsule, Take 1 capsule (300 mg total) by mouth every evening., Disp: 30 capsule, Rfl: 11  HUMALOG KWIKPEN 200 unit/mL (3 mL) InPn, 12 units with breakfast, 14-16 units with lunch and dinner, Disp: , Rfl:   levocetirizine (XYZAL) 5 MG tablet, Take 1 tablet (5 mg total) by mouth every evening., Disp: 30 tablet, Rfl:  11  metFORMIN (GLUCOPHAGE XR) 500 MG 24 hr tablet, Take 1 tablet (500 mg total) by mouth daily with breakfast., Disp: 90 tablet, Rfl: 3  olmesartan (BENICAR) 20 MG tablet, Take 1 tablet (20 mg total) by mouth once daily., Disp: 60 tablet, Rfl: 3  TRESIBA FLEXTOUCH U-200 200 unit/mL (3 mL) InPn, INJECT 40 UNITS SUBCUTANEOUSLY AT BEDTIME, Disp: 3 mL, Rfl: 3    No current facility-administered medications for this visit.       Review of patient's allergies indicates:  No Known Allergies        Review of Systems   Constitution: Negative for weight gain and weight loss.   Cardiovascular: Negative for chest pain.   Respiratory: Positive for shortness of breath (with back pain).    Musculoskeletal: Positive for back pain. Negative for joint pain and joint swelling.   Gastrointestinal: Negative for abdominal pain, bowel incontinence, nausea and vomiting.   Genitourinary: Negative for bladder incontinence.   Neurological: Negative for numbness and paresthesias.         Objective:        General: Chip is well-developed, well-nourished, appears stated age, in no acute distress, alert and oriented to time, place and person.     General    Vitals reviewed.  Constitutional: He is oriented to person, place, and time. He appears well-developed and well-nourished.   HENT:   Head: Normocephalic and atraumatic.   Pulmonary/Chest: Effort normal.   Neurological: He is alert and oriented to person, place, and time.   Psychiatric: He has a normal mood and affect. His behavior is normal. Judgment and thought content normal.     General Musculoskeletal Exam   Gait: normal (slow and hesitant)     Right Ankle/Foot Exam     Tests   Heel Walk: able to perform  Tiptoe Walk: able to perform    Left Ankle/Foot Exam     Tests   Heel Walk: able to perform  Tiptoe Walk: able to perform      Right Hip Exam     Range of Motion   External rotation: 50 (with groin pain)   Internal rotation: 10 (with groin pain)     Tests   Pain w/ forced internal  rotation (JUAN): absent  Left Hip Exam     Tests   Pain w/ forced internal rotation (JUAN): absent      Back (L-Spine & T-Spine) / Neck (C-Spine) Exam     Tenderness Right paramedian tenderness of the Lower T-Spine.     Back (L-Spine & T-Spine) Range of Motion   Extension: 20 (pain in upper and lower back)   Flexion: 80 (pain upper back)   Lateral bend right: 10 (pain lower thoracic)   Lateral bend left: 10 (pain lower thoracic)   Rotation right: 30   Rotation left: 30     Spinal Sensation   Right Side Sensation  C-Spine Level: normal   L-Spine Level: normal  S-Spine Level: normal  Left Side Sensation  C-Spine Level: normal  L-Spine Level: normal  S-Spine Level: normal    Back (L-Spine & T-Spine) Tests   Right Side Tests  Straight leg raise:      Sitting SLR: > 70 degrees      Left Side Tests  Straight leg raise:     Sitting SLR: > 70 degrees          Other He has no scoliosis .  Spinal Kyphosis:  Absent      Muscle Strength   Right Upper Extremity   Biceps: 5/5/5   Deltoid:  5/5  Triceps:  5/5  Wrist extension: 5/5/5   Finger Flexors:  5/5  Left Upper Extremity  Biceps: 5/5/5   Deltoid:  5/5  Triceps:  5/5  Wrist extension: 5/5/5   Finger Flexors:  5/5  Right Lower Extremity   Hip Flexion: 5/5   Quadriceps:  5/5   Anterior tibial:  5/5/5  EHL:  5/5  Left Lower Extremity   Hip Flexion: 5/5   Quadriceps:  5/5   Anterior tibial:  5/5/5   EHL:  5/5    Reflexes     Left Side  Biceps:  2+  Triceps:  2+  Brachioradialis:  2+  Quadriceps:  2+  Achilles:  2+  Left Adair's Sign:  Absent  Babinski Sign:  absent    Right Side   Biceps:  2+  Triceps:  2+  Brachioradialis:  2+  Quadriceps:  2+  Achilles:  2+  Right Adair's Sign:  absent  Babinski Sign:  absent    Vascular Exam     Right Pulses        Carotid:                  2+    Left Pulses        Carotid:                  2+              Assessment:       1. DDD (degenerative disc disease), lumbar    2. Chronic right-sided low back pain with right-sided sciatica            Plan:       Orders Placed This Encounter    Procedure Order to [facility]    Ambulatory consult to [facility]       More than 50% of the total time of 45 minutes was spent in counseling on diagnosis and treatment options. I reviewed the MRI report available, and will try to get the full report and the images.  We discussed back pain and the nature of back pain.  We discussed that it will likely improve and that it is not one thing that causes the pain but an accumulation of multiple things that we do.  We discussed posture sitting and the importance of trying to sit better.  We discussed the benefits of therapy and exercise and continuing to move.  1.  Right L5 and S1 TF Jweel with pain management  2  he will bring the disc for review and take to his pain management appointment  3.  Pt at healthy back pattern 1 lumbar  4.  Flexeril try half during the day  5.  Gabapentin 300mg po BID, we discussed increasing to 3-4 a day  6.  Etodolac twice a day  7.  RTC 3months      MRI report review 7/15/2019  L4-L5 central herniated disc  With cranial caudal subligamentous extension that is displacing bilateral L5 nerve root   L5-S1 central herniated disc displacing bilateral S1 nerve root   Annular fissure to L4-5, L5-S1   Bilateral Facet arthropathy L4-5, L5-S1  Foraminal stenosis  l4-5, l5-S1  Straightening of the normal lumbar lordosis    Follow-up: No follow-ups on file. If there are any questions prior to this, the patient was instructed to contact the office.

## 2019-07-11 ENCOUNTER — PATIENT OUTREACH (OUTPATIENT)
Dept: ADMINISTRATIVE | Facility: OTHER | Age: 39
End: 2019-07-11

## 2019-07-12 ENCOUNTER — OFFICE VISIT (OUTPATIENT)
Dept: SPINE | Facility: CLINIC | Age: 39
End: 2019-07-12
Attending: PHYSICAL MEDICINE & REHABILITATION
Payer: COMMERCIAL

## 2019-07-12 VITALS
BODY MASS INDEX: 32.76 KG/M2 | HEIGHT: 72 IN | WEIGHT: 241.88 LBS | HEART RATE: 92 BPM | SYSTOLIC BLOOD PRESSURE: 121 MMHG | DIASTOLIC BLOOD PRESSURE: 78 MMHG | TEMPERATURE: 99 F

## 2019-07-12 DIAGNOSIS — M51.36 DDD (DEGENERATIVE DISC DISEASE), LUMBAR: Primary | ICD-10-CM

## 2019-07-12 DIAGNOSIS — M54.41 CHRONIC RIGHT-SIDED LOW BACK PAIN WITH RIGHT-SIDED SCIATICA: ICD-10-CM

## 2019-07-12 DIAGNOSIS — G89.29 CHRONIC RIGHT-SIDED LOW BACK PAIN WITH RIGHT-SIDED SCIATICA: ICD-10-CM

## 2019-07-12 PROCEDURE — 3078F DIAST BP <80 MM HG: CPT | Mod: CPTII,S$GLB,, | Performed by: PHYSICAL MEDICINE & REHABILITATION

## 2019-07-12 PROCEDURE — 99204 OFFICE O/P NEW MOD 45 MIN: CPT | Mod: S$GLB,,, | Performed by: PHYSICAL MEDICINE & REHABILITATION

## 2019-07-12 PROCEDURE — 99204 PR OFFICE/OUTPT VISIT, NEW, LEVL IV, 45-59 MIN: ICD-10-PCS | Mod: S$GLB,,, | Performed by: PHYSICAL MEDICINE & REHABILITATION

## 2019-07-12 PROCEDURE — 3008F PR BODY MASS INDEX (BMI) DOCUMENTED: ICD-10-PCS | Mod: CPTII,S$GLB,, | Performed by: PHYSICAL MEDICINE & REHABILITATION

## 2019-07-12 PROCEDURE — 3078F PR MOST RECENT DIASTOLIC BLOOD PRESSURE < 80 MM HG: ICD-10-PCS | Mod: CPTII,S$GLB,, | Performed by: PHYSICAL MEDICINE & REHABILITATION

## 2019-07-12 PROCEDURE — 3074F PR MOST RECENT SYSTOLIC BLOOD PRESSURE < 130 MM HG: ICD-10-PCS | Mod: CPTII,S$GLB,, | Performed by: PHYSICAL MEDICINE & REHABILITATION

## 2019-07-12 PROCEDURE — 3074F SYST BP LT 130 MM HG: CPT | Mod: CPTII,S$GLB,, | Performed by: PHYSICAL MEDICINE & REHABILITATION

## 2019-07-12 PROCEDURE — 99999 PR PBB SHADOW E&M-EST. PATIENT-LVL IV: ICD-10-PCS | Mod: PBBFAC,,, | Performed by: PHYSICAL MEDICINE & REHABILITATION

## 2019-07-12 PROCEDURE — 99999 PR PBB SHADOW E&M-EST. PATIENT-LVL IV: CPT | Mod: PBBFAC,,, | Performed by: PHYSICAL MEDICINE & REHABILITATION

## 2019-07-12 PROCEDURE — 3008F BODY MASS INDEX DOCD: CPT | Mod: CPTII,S$GLB,, | Performed by: PHYSICAL MEDICINE & REHABILITATION

## 2019-07-12 RX ORDER — GABAPENTIN 300 MG/1
300 CAPSULE ORAL 3 TIMES DAILY
Qty: 90 CAPSULE | Refills: 2 | Status: SHIPPED | OUTPATIENT
Start: 2019-07-12 | End: 2020-04-15 | Stop reason: SDUPTHER

## 2019-07-12 NOTE — LETTER
July 12, 2019      Yessenia Albert, DO  [address] [facility] [address]           [address]  [facility] [place]  [facility] [address]  Phone: [phone]  Fax: [phone]          Patient: Chip Arora   MR Number: [id number]   YOB: 1980   Date of Visit: 7/12/2019       Dear Dr. Yessenia Albert:    Thank you for referring Chip Arora to me for evaluation. Attached you will find relevant portions of my assessment and plan of care.    If you have questions, please do not hesitate to call me. I look forward to following Chip Arora along with you.    Sincerely,    Miriam Hughes MD    Enclosure  CC:  No Recipients    If you would like to receive this communication electronically, please contact [email or web address] or [phone] to request more information on [employer] access.    For providers and/or their staff who would like to refer a patient to [facility], please contact us through our one-stop-shop provider referral line, [facility], at [phone].    If you feel you have received this communication in error or would no longer like to receive these types of communications, please e-mail [email or web address]

## 2019-07-15 ENCOUNTER — TELEPHONE (OUTPATIENT)
Dept: PAIN MEDICINE | Facility: CLINIC | Age: 39
End: 2019-07-15

## 2019-07-15 NOTE — TELEPHONE ENCOUNTER
[facility] New Consult          Phuong Francois MRN# [id number]   Age: 66 year old YOB: 1951        Reason for consult: CLL       HPI:     66-year-old female with history of CLL is here for a second opinion for CLL.  Her regular oncologist is Dr. Judith Lopez from [facility].      Diagnosis: CLL diagnosed in 2017, with normal cyogenetics  Treatment: No Rx    Oncologic history: In 2017, patient went to PCP for annual exam and was found to have left lump in the neck, cervical lymphadenopathy. CBC showed white count of 24.8 thousand, hemoglobin 13.7, platelet count of 204,000.  With 80% lymphocytes in the differential.  Flow cytometry from peripheral blood consistent with CLL: showed a population of lambda restricted B lymphocytes with positive CD23, CD43 and CD5. (CD19 brightly positive, moderately positive CD79b/CD45/CD23/CD22/CD43/CD5 and dimly positive for CD20 negative markers for CD2/CD 3/CD34///kappa/FMC 7/ CD10/ CD38).   Cytogenetics: Normal cytogenetics with no 17 P deletion or 11 q. Deletion  Hepatitis B and hepatitis C are non-reactive  Ig, IgA: 61, IgM: 33  Stage at diagnosis: Byrne stage I     Most recent CBC on 2018 shows WBC of 83,000, hemoglobin 13.4%, platelets 160 with differential showing 77% lymphocytes.  His chemistries sodium 143, potassium 4.2, chloride 107, bicarb 27, BUN 15, creatinine 0.86, calcium 9, albumin 4.3, total protein 6.5, total bilirubin 0.6, alkaline phosphatase 87, AST 30, ALT 19, , GFR 70    CT imaging 2018    CT of the neck on 2018 showing diffuse lymphadenopathy right neck level 5 lymph node 2.1 into 1.3 cm and left-sided level 5 lymph node 1.5 and 1.2 cm  CT of the chest abdomen pelvis shows right axillary lymph node 3.1 into 1.6 cm and left axillary lymph node 3.3 and 1.6 cm.  Spleen is 10.7 into 11.2 into 9.3 cm.  Left para-aortic lymph nodes measures 2.5 and 1.8 cm.  She also has  Attempted to contact patient to schedule procedure, no answer, left voice message to return call.   significant pelvic and retroperitoneal lymphadenopathy with the largest being right external iliac at 3.4 and 2.5 cm, no enlarged bilateral inguinal lymphadenopathy.    Interval Hx:  Over the last few months she has noticed gradual increase in size of LN in the necka nd noticed new LN in the groin and axilla. She however denies any other symptoms. No fever, night sweats and chills, wt loss, abdominal pain, nausea, vomiting, diarrhea, infectious symptoms. She has noticed some swelling in the legs, rt >lt since 2-3 months. It is stable. ROS is also positive for rash, itchy, raised and red, random occurrence like bug bite. She started having hives and itching a/w rash. She is taking benadryl on and off and steroid cream and seems to be under control. Dermatology biopsied one site which showed bug bite.     Review of system: Complete ROS otherwise negative         Past Medical History:    No significant PMHx  Basal cell Ca excised         Past Surgical History:   Gall bladder resection >10 yrs  Bunion surgery  Back surgery, microdiscetomy, herniated disc           Social History:     Social History     Social History     Marital status:      Spouse name: N/A     Number of children: N/A     Years of education: N/A     Occupational History     Not on file.     Social History Main Topics     Smoking status: Not on file     Smokeless tobacco: Not on file     Alcohol use Not on file     Drug use: Not on file     Sexual activity: Not on file     Other Topics Concern     Not on file     Social History Narrative   Drink occasionally once 2-3 weeks  Was a nurse, [employer]  Lives with , [facility]  3 kids, 38, 37 and 32yrs.            Family History:   Great aunt: Breast cancer  FGM: Uterine cancer  Father: heart problems, mitral valve, CHF  Mother: Necrotic bowel          Allergies:   . Allergies not on file          Medications:     Current Outpatient Prescriptions   Medication     Ascorbic Acid (VITAMIN  "C PO)     DiphenhydrAMINE HCl (BENADRYL PO)     IBUPROFEN PO     multivitamin, therapeutic with minerals (MULTI-VITAMIN) TABS tablet     No current facility-administered medications for this visit.            Vital signs:                         Estimated body mass index is 24.18 kg/(m^2) as calculated from the following:    Height as of this encounter: 1.6 m (5' 3\").    Weight as of this encounter: 61.9 kg (136 lb 8 oz).    Physical exam:    Gen: Well built and nourished, not in any acute distress  HEENT: Mucous Membrane Moist, no oral lesions, no pallor, icterus  Neck: supple, b/l cervical LN in lower neck 1-2 cm, mobile and discrete  Lymph Nodes: B/l axillary LN rt >lt, multiple 2-3 cm, mobile and discrete, b/l inguinal LN 1-2 cm multiple  CVS: Regular Rate Rhythm, no murmurs  Resp: CTA, no added sounds  GI: Soft, non-tender, no Hepatospleenomegaly  Extremities: no edema   Skin: no bruises  Neuro: AAOx4. Cranial nerves grossly intact. Strength 5/5 throughout. Sensations grossly intact.          Data:   The labs and imaging were reviewed.    Assessment and Plan    #CLL, FERNANDEZ stage 1, normal risk cytogenetics-   Pt is otherwise healthy and relatively asymptomatic. She does not have high risk cytogenetics or cytopenias nor  bulk LN or splenomegaly which are all indications for treatment. Therefore, watchful waiting with regular CBC, CMP and imaging (only if indicated) is appropriate. Should she develop any these symptoms in future we can consider treatment.     We discussed about natural Hx of CLL and indications for treatment. She asked several intelligent questions which were answered to her satisfaction. She is happy to f/u with her regular Oncologist. She can contact us for any questions.    Staffed with Dr. Topete.    Surinder Terrazas  Hem Onc fellow  [phone]      Surinder Terrazas MD       Attending Addendum:  The patient was seen and evaluated. All medical records, testing results were reviewed and the plan " "was discussed with the fellow. The note above has been edited to reflect my findings.    Additional comments:  Pleasant 67 yo woman with CLL (without high risk cytogenetics) diagnosed in 2017 with steady WBC progression over the last year and some non-bulky KELSEY in the neck and above and below the diaphragm but with adequate Hgb and platelets and no B symptoms.    As above we reviewed CLL natural history, CLL risk statification by FISH/Cytogenetics, and approaches to observation and treatment. We agree with the current approach of observation with regular CBC checks and physical exams. We conveyed the importance of annual flu shots, vigilance about infection management, and consideration of \"killed\" shingles vaccine (shingrix) and to avoid any live viruses.     She is in agreement with the above and in agreement with observation and was happy to hear the consensus in recommendations. I gave her my card if questions come up in the future but she will be returning to her regular oncologist for ongoing management.    Mis Topete    "

## 2019-07-16 ENCOUNTER — TELEPHONE (OUTPATIENT)
Dept: PAIN MEDICINE | Facility: CLINIC | Age: 39
End: 2019-07-16

## 2019-07-18 ENCOUNTER — TELEPHONE (OUTPATIENT)
Dept: ENDOCRINOLOGY | Facility: CLINIC | Age: 39
End: 2019-07-18

## 2019-07-18 ENCOUNTER — PATIENT MESSAGE (OUTPATIENT)
Dept: ENDOCRINOLOGY | Facility: CLINIC | Age: 39
End: 2019-07-18

## 2019-07-18 DIAGNOSIS — Z79.4 TYPE 2 DIABETES MELLITUS WITHOUT COMPLICATION, WITH LONG-TERM CURRENT USE OF INSULIN: Primary | ICD-10-CM

## 2019-07-18 DIAGNOSIS — E11.9 TYPE 2 DIABETES MELLITUS WITHOUT COMPLICATION, WITH LONG-TERM CURRENT USE OF INSULIN: Primary | ICD-10-CM

## 2019-07-18 RX ORDER — INSULIN LISPRO 200 [IU]/ML
INJECTION, SOLUTION SUBCUTANEOUS
Qty: 45 ML | Refills: 3 | Status: SHIPPED | OUTPATIENT
Start: 2019-07-18

## 2019-07-18 NOTE — TELEPHONE ENCOUNTER
Confirmed current Humalog dose with pt.   He states he is currently doing ICR (1:8) plus LDC.   Will send refill.

## 2019-07-19 ENCOUNTER — TELEPHONE (OUTPATIENT)
Dept: PAIN MEDICINE | Facility: CLINIC | Age: 39
End: 2019-07-19

## 2019-07-19 DIAGNOSIS — M51.36 DDD (DEGENERATIVE DISC DISEASE), LUMBAR: Primary | ICD-10-CM

## 2019-07-29 ENCOUNTER — PATIENT OUTREACH (OUTPATIENT)
Dept: ADMINISTRATIVE | Facility: OTHER | Age: 39
End: 2019-07-29

## 2019-08-01 ENCOUNTER — PATIENT OUTREACH (OUTPATIENT)
Dept: ADMINISTRATIVE | Facility: HOSPITAL | Age: 39
End: 2019-08-01

## 2019-08-02 ENCOUNTER — CLINICAL SUPPORT (OUTPATIENT)
Dept: REHABILITATION | Facility: OTHER | Age: 39
End: 2019-08-02
Attending: PHYSICAL MEDICINE & REHABILITATION
Payer: COMMERCIAL

## 2019-08-02 DIAGNOSIS — M62.81 WEAKNESS OF TRUNK MUSCULATURE: ICD-10-CM

## 2019-08-02 DIAGNOSIS — G89.29 CHRONIC RIGHT-SIDED LOW BACK PAIN WITH RIGHT-SIDED SCIATICA: ICD-10-CM

## 2019-08-02 DIAGNOSIS — M54.41 CHRONIC RIGHT-SIDED LOW BACK PAIN WITH RIGHT-SIDED SCIATICA: ICD-10-CM

## 2019-08-02 PROBLEM — M54.40 CHRONIC RIGHT-SIDED LOW BACK PAIN WITH SCIATICA: Status: ACTIVE | Noted: 2019-08-02

## 2019-08-02 PROCEDURE — 97161 PT EVAL LOW COMPLEX 20 MIN: CPT

## 2019-08-02 PROCEDURE — 97110 THERAPEUTIC EXERCISES: CPT

## 2019-08-02 NOTE — PATIENT INSTRUCTIONS
On Elbows (Prone)        Rise up on elbows as high as possible, keeping hips on floor. Hold ____ seconds.  Repeat ____ times per set. Do ____ sets per session. Do ____ sessions per day.     [email or web address]     Copyright © [employer]. All rights reserved.   Lower Trunk Rotation Stretch        Keeping back flat and feet together, rotate knees to left side. Hold ____ seconds.  Repeat ____ times per set. Do ____ sets per session. Do ____ sessions per day.     [email or web address]     Copyright © [employer]. All rights reserved.   Lumbar Rotation (Sitting)        Arms crossed, gently rotate trunk from side to side in a small, pain-free range of motion.  Repeat ____ times per set. Do ____ sets per session. Do ____ sessions per day.     [email or web address]     Copyright © [employer]. All rights reserved.

## 2019-08-02 NOTE — PROGRESS NOTES
"  [facility] - PHYSICAL THERAPY EVALUATION     Name: Chip Arora  Clinic Number: [id number]    Therapy Diagnosis:   Encounter Diagnoses   Name Primary?    Weakness of trunk musculature     Chronic right-sided low back pain with right-sided sciatica      Physician: Miriam Hughes, *    Physician Orders: PT Eval and Treat   Medical Diagnosis from Referral:   M51.36 (ICD-10-CM) - DDD (degenerative disc disease), lumbar   M54.41,G89.29 (ICD-10-CM) - Chronic right-sided low back pain with right-sided sciatica       Evaluation Date: 8/2/2019  Authorization Period Expiration: 12/31/19  Plan of Care Expiration: 11/2/19  Reassessment Due: 9/2/19  Visit # / Visits authorized: 1/ 20    Time In: 1pm  Time Out: 230pm  Total Billable Time: 90 minutes    Precautions: Standard and Diabetes, HTN, anxiety,depression    Pattern of pain determined: 1  PEP      Subjective   Date of onset: 3months  History of current condition - Chip reports: Pt injured himself during his stint in the  when prevented a five hundred pound piece of airplane equipt landed on his back.  OOW x 5 months.  Periods of exacerbation ,but for the most part pain was consistent and not increased until 3 months ago.  Exacerbations occur 1x month which he may have to lay in bed for a couple of days.  Pt reports LB is mostly R sided, with infrequent "shooting pain " into R LE.  Pt reports R paraspinals go into and spasms.  Pain is back dominant and constant.  Pt reports right leg weakness at times as well.     as per MD:Mr Arora is a 40 yo male sent in consultation by Dr. Albert for evaluation of low back pain.  He has had back pain for the past 20 years, but worse the past 2 months.  He feels like he usually has a flare once a year, but this one is lasting longer and getting worse.  He feels like pain use to be minor, but more intense.  The pain is a dull lumbar pain and sharp sacral pain on right and the outside of the right leg pain and to " top of the leg.  The leg pain and back pain is constant.  The pain is with any position for too long.  He has pain with lifting the leg up and getting out of car and out of bed.  He has a hard time bringing right leg out.  He feels like his right back gets tight and left back is not being used when pain is severe.  He does not have MRI report.  We do have report interpretation from PCP.  The pain will go to outsid of calf.  The pain is a pinch.  He does not feel like the pain goes to the foot. He is taking gabapentin 300mg po BID, he is taking etodolac bid, and flexeril at night.  He does feel like the meds help.  He is getting scheduled for healthy back.  He has done PT in the past.  He has been to chiropractor in the past.  Pain is 6/10 now, worst 9/10 in the morning without meds, best 4/10 after medicine atnight     Medical History:   Past Medical History:   Diagnosis Date    Anxiety     Bulge of lumbar disc without myelopathy     Depression     Diabetes mellitus, type 2     sees endocrine; [facility] 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    Medications:   Chip has a current medication list which includes the following prescription(s): azelastine, bupropion, dextroamphetamine-amphetamine, dulaglutide, empagliflozin, esomeprazole, etodolac, flash glucose scanning reader, flash glucose sensor, gabapentin, humalog kwikpen insulin, levocetirizine, metformin, olmesartan, and tresiba flextouch u-200.    Allergies:   Review of patient's allergies indicates:   Allergen Reactions    Contrast media Nausea And Vomiting        Imaging, MRI studies:   FINDINGS:  Mild DJD.  The lumbosacral disc space is slightly  "narrowed.  The other disc spaces are well maintained.  No fracture, spondylolisthesis or bone destruction identified        MRI lumbar outside Dr. 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 , stands all day  Leisure: sports,exercise,kickball, bike ,hiking  Prior Level of Function: I c/ ADL's  Current Level of Function: Pt reports difficulty performing ADL's, difficulty putting socks and shoes on/putting pants on/off/standing at sink  DME owned/used: [employer]/ sports massager        Pain:  Current 4/10, worst 9/10, best 3/10   Location: right back  and buttocks  Infrequent radiation into R hip, into lateral thigh  Description: Sharp and spasm, dull  Aggravating Factors: Getting out of bed/chair, getting off floor, standing prolonged, AM, bending fwd, squatting, putting shoes/socks on, getting in/out of car, lifting R LE, reaching  Easing Factors: meds/pressure points/stretching, walking, laying on his left side  Disturbed Sleep: yes          Pattern of pain questions:  1.  Where is your pain the worst? LB  2.  Is your pain constant or intermittent? constant  3.  Does bending forward make your typical pain worse? fwd  4.  Since the start of your back pain, has there been a change in your bowel or bladder?   5.  What can't you do now that you use to be able to do? Work on his car, perform household chores, dress independently,       Pts goals: go back to exercises more, not has issues with basic function"      Red Flag Screening:   Cough  Sneeze  Strain: (+)  Bladder/ bowel: (--)  Falls: (--)  Night pain: (+)  Unexplained weight loss: (--)  General health: fair    OBJECTIVE     Postural " examination/scapula alignment: increased rounded shoulder/fwd head/inc lordosis/  Joint integrity: Firm end feeling  Skin integrity: intact  Edema: none noted  Sitting: poor  Standing: poor  Correction of posture: better with lumbar roll    MOVEMENT LOSS    ROM Loss   Flexion major loss   Extension moderate loss   Side bending Right moderate loss   Side bending Left moderate loss/max loss   Rotation Right moderate loss   Rotation Left moderate loss       Decreased IR/ER of R hip    Lower Extremity Strength  Decreased R LE strength secondary to LBP   Right LE  Left LE    Hip flexion: 4/5 Hip flexion: 5/5   Hip extension:  4/5 Hip extension: 4/5   Hip abduction: 4+/5 Hip abduction: 4+/5   Hip adduction:  4+/5 Hip adduction:  4+/5   Hip Internal rotation   3/5 Hip Internal rotation 4-/5   Knee Flexion 4+/5 Knee Flexion 4+/5   Knee Extension 4+/5 Knee Extension 4+/5   Ankle dorsiflexion: 5/5 Ankle dorsiflexion: 5/5   Ankle plantarflexion: 5/5 Ankle plantarflexion: 5/5       GAIT:  Assistive Device used: none  Level of Assistance: independent  Patient displays the following gait deviations:  no gait deviations observed.     Special Tests:   Test Name  Test Result   Prone Instability Test (--)   SI Joint Provocation Test (--)   Straight Leg Raise8 (--)   Neural Tension Test (+)   Crossed Straight Leg Raise (--)   Walking on toes (--)   Walking on heels  (--)       NEUROLOGICAL SCREENING     Sensory deficit: intact BLE's    Reflexes:    Left Right   Patella Tendon 2+ 2+   Achilles Tendon 2+ 2+   Babinski  (--) (--)   Clonus (--) (--)     REPEATED TEST MOVEMENTS:  Repeated Flexion in Standing pain during motion  worse   Repeated Extension in Standing pain during motion, end range pain, worse   Repeated Flexion in lying pain during motion, no worse   Repeated Extension in lying  pain during motion  no worse       STATIC TESTS   Sitting slouched  worse   Sitting erect better   Standing slouched worse   Standing erect  better    Lying prone in extension  better   Long sitting   not tested       Baseline Isometric Testing on Med X equipment: Testing administered by PT  Date of testin19  ROM 12 deg-33   Max Peak Torque 225    Min Peak Torque 187    Flex/Ext Ratio 1.2:1   % below normative data 7below     Start at 80 ftlbs    CMS Impairment/Limitation/Restriction for FOTO  Survey    Therapist reviewed FOTO scores for Chip Arora on 2019.   FOTO documents entered into [employer] - see Media section.    Limitation Score: 62%  Category: Mobility    Current : CL = least 60% but < 80% impaired, limited or restricted  Goal: CK = at least 40% but < 60% impaired, limited or restricted  Discharge: CK = at least 40% but < 60% impaired, limited or restricted             Treatment   Treatment Time In: 2pm  Treatment Time Out: 230pm  Total Treatment time separate from Evaluation: 30 minutes      Chip received therapeutic exercises to develop/improve posture, lumbar/cervical ROM, strength and muscular endurance for 30 minutes including the following exercises:     [facility] 2019   Visit Number 1   VAS Pain Rating 7   Femur Restraint 6   Top Dead Center 24   Counterweight 250   Lumbar Flexion 33   Lumbar Extension 12   Lumbar Peak Torque 225   Min Torque 187   Test Percent Below Normative Data 7   Lumbar Weight 60   Ice - Z Lie (in min.) 10     LTR  WES  OB        Written Home Exercises Provided: yes.  Exercises were reviewed and Chip was able to demonstrate them prior to the end of the session.  Chip demonstrated good  understanding of the education provided.     See EMR under Patient Instructions for exercises provided 2019.      Education provided:   - Patient received education regarding proper posture and body mechanics.  Patient was given top 10 tips handout which discusses posture seated, standing, lifting correctly, components of exercise, importance of nutrition and hydration, and importance of sleep.  - Martina guan  tried, recommended, and purchase information was provided.    - Patient received a handout regarding anticipated muscular soreness following the isometric test and strategies for management were reviewed with patient including stretching, using ice and scheduled rest.   - Patient received education on the [facility] program, purpose of the isometric test, progression of back strengthening as well as wellness approach and systemic strengthening.  Details of the program were discussed.  Reviewed that patient should feel support/pressure from med ex restraints but no pain or discomfort and patient expressed understanding.    Chip received cold pack for 10 minutes to LB.    Assessment   Chip is a 39 y.o. male referred to [facility] with a medical diagnosis of   M51.36 (ICD-10-CM) - DDD (degenerative disc disease), lumbar   M54.41,G89.29 (ICD-10-CM) - Chronic right-sided low back pain with right-sided sciatica     . Pt presents with decreased Lumbar ROM, decreased trunk strength, decreased flexibility, 62% FOTO disability score, and limited ADL's secondary to pain and immobility.  Pt is 7% below strength norms but has a very limited ROM.  Do Not start pt > 80ft/lbs on visit 2  Pain Pattern: 1PEP   Re-assess movement preference  Pt prognosis is Good.   Pt will benefit from skilled outpatient Physical Therapy to address the deficits stated above and in the chart below, provide pt/family education, and to maximize pt's level of independence. Based on the above history and physical examination an active physical therapy program is recommended.  Pt will continue to benefit from skilled outpatient physical therapy to address the deficits listed below in the chart, provide pt/family education and to maximize pt's level of independence in the home and community environment. .       Plan of care discussed with patient: Yes  Pt's spiritual, cultural and educational needs considered and patient is agreeable to the  plan of care and goals as stated below:     Anticipated Barriers for therapy: anxiety    PT Evaluation Completed? Yes    Medical necessity is demonstrated by the following problem list.    Pt presents with the following impairments:     History  Co-morbidities and personal factors that may impact the plan of care Co-morbidities:   anxiety, depression, diabetes and HTN    Personal Factors:   coping style     low   Examination  Body Structures and Functions, activity limitations and participation restrictions that may impact the plan of care Body Regions:   back    Body Systems:    gross symmetry  ROM  strength  gross coordinated movement  transitions  motor control    Participation Restrictions:   Standing, sit to stand, sitting, fwd bending, lifting    Activity limitations:   Learning and applying knowledge  no deficits    General Tasks and Commands  no deficits    Communication  no deficits    Mobility  lifting and carrying objects  walking    Self care  dressing    Domestic Life  shopping  cooking  doing house work (cleaning house, washing dishes, laundry)    Interactions/Relationships  no deficits    Life Areas  no deficits    Community and Social Life  community life  recreation and leisure         low   Clinical Presentation stable and uncomplicated low   Decision Making/ Complexity Score: low       GOALS: Pt is in agreement with the following goals.    Short term goals:  6 weeks or 10 visits   1.  Pt will demonstrate increased lumbar ROM by at least 3 degrees from the initial ROM value with improvements noted in functional ROM and ability to perform ADLs.  2.  Pt will demonstrate increased maximum isometric torque value by 10% when compared to the initial value resulting in improved ability to perform bending, lifting, and carrying activities safely, confidently.    3.  Patient report a reduction in worst pain score by 1-2 points for improved tolerance for .standing activities.  4.  Pt able to perform HEP  "correctly with minimal cueing or supervision from therapist to encourage independent management of symptoms.       Long term goals: 13 weeks or 20 visits   1. Pt will demonstrate increased lumbar ROM by at least 6 degrees from initial ROM value, resulting in improved ability to perform functional fwd bending while standing and sitting.   2. Pt will demonstrate increased maximum isometric torque value by 30% when compared to the initial value resulting in improved ability to perform bending, lifting, and carrying activities safely, confidently.  3. Pt to demonstrate ability to independently control and reduce their pain through posture positioning and mechanical movements throughout a typical day.  4.  Pt will demonstrate reduced pain and improved functional outcomes as reported on the FOTO by reaching a score of CK = at least 40% but < 60% impaired, limited or restricted or less in order to demonstrate subjective improvement in pt's condition.    5. Pt will demonstrate independence with the HEP at discharge  6.  Pt(patient goal)will be able to return to hiking and sports with min LBP      Plan   Outpatient physical therapy 2x week for 10 weeks or 20 visits to include the following:   - Patient education  - Therapeutic exercise  - Manual therapy  - Performance testing   - Neuromuscular Re-education  - Therapeutic activity   - Modalities    Pt may be seen by PTA as part of the rehabilitation team.     Therapist: Nita Foreman, PT  8/2/2019    "I certify the need for these services furnished under this plan of treatment and while under my care."    ____________________________________  Physician/Referring Practitioner    _______________  Date of Signature        "

## 2019-08-02 NOTE — PLAN OF CARE
"  [facility] - PHYSICAL THERAPY EVALUATION     Name: Chpi Arora  Clinic Number: [id number]    Therapy Diagnosis:   Encounter Diagnoses   Name Primary?    Weakness of trunk musculature     Chronic right-sided low back pain with right-sided sciatica      Physician: Miriam Hughes, *    Physician Orders: PT Eval and Treat   Medical Diagnosis from Referral:   M51.36 (ICD-10-CM) - DDD (degenerative disc disease), lumbar   M54.41,G89.29 (ICD-10-CM) - Chronic right-sided low back pain with right-sided sciatica       Evaluation Date: 8/2/2019  Authorization Period Expiration: 12/31/19  Plan of Care Expiration: 11/2/19  Reassessment Due: 9/2/19  Visit # / Visits authorized: 1/ 20    Time In: 1pm  Time Out: 230pm  Total Billable Time: 90 minutes    Precautions: Standard and Diabetes, HTN, anxiety,depression    Pattern of pain determined: 1  PEP      Subjective   Date of onset: 3months  History of current condition - Chip reports: Pt injured himself during his stint in the  when prevented a five hundred pound piece of airplane equipt landed on his back.  OOW x 5 months.  Periods of exacerbation ,but for the most part pain was consistent and not increased until 3 months ago.  Exacerbations occur 1x month which he may have to lay in bed for a couple of days.  Pt reports LB is mostly R sided, with infrequent "shooting pain " into R LE.  Pt reports R paraspinals go into and spasms.  Pain is back dominant and constant.  Pt reports right leg weakness at times as well.     as per MD:Mr Arora is a 40 yo male sent in consultation by Dr. Albert for evaluation of low back pain.  He has had back pain for the past 20 years, but worse the past 2 months.  He feels like he usually has a flare once a year, but this one is lasting longer and getting worse.  He feels like pain use to be minor, but more intense.  The pain is a dull lumbar pain and sharp sacral pain on right and the outside of the right leg pain and to " top of the leg.  The leg pain and back pain is constant.  The pain is with any position for too long.  He has pain with lifting the leg up and getting out of car and out of bed.  He has a hard time bringing right leg out.  He feels like his right back gets tight and left back is not being used when pain is severe.  He does not have MRI report.  We do have report interpretation from PCP.  The pain will go to outsid of calf.  The pain is a pinch.  He does not feel like the pain goes to the foot. He is taking gabapentin 300mg po BID, he is taking etodolac bid, and flexeril at night.  He does feel like the meds help.  He is getting scheduled for healthy back.  He has done PT in the past.  He has been to chiropractor in the past.  Pain is 6/10 now, worst 9/10 in the morning without meds, best 4/10 after medicine atnight     Medical History:   Past Medical History:   Diagnosis Date    Anxiety     Bulge of lumbar disc without myelopathy     Depression     Diabetes mellitus, type 2     sees endocrine; Dr. Hernandez at 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    Medications:   Chip has a current medication list which includes the following prescription(s): azelastine, bupropion, dextroamphetamine-amphetamine, dulaglutide, empagliflozin, esomeprazole, etodolac, flash glucose scanning reader, flash glucose sensor, gabapentin, humalog kwikpen insulin, levocetirizine, metformin, olmesartan, and tresiba flextouch u-200.    Allergies:   Review of patient's allergies indicates:   Allergen Reactions    Contrast media Nausea And Vomiting        Imaging, MRI studies:   FINDINGS:  Mild DJD.  The lumbosacral disc space is slightly  "narrowed.  The other disc spaces are well maintained.  No fracture, spondylolisthesis or bone destruction identified        MRI lumbar outside Dr. Gonsalves impression  L4-L5 central herniated disc that is displacing your S1 nerve root   L5-S1 central herniated disc   Annular fissure to L5-S1   Facet arthropathy or degenerative change   Foraminal stenosis        Prior Therapy: previous PT/chiropractic care  Prior Treatment: chiropractic care  Social History:  lives with their spouse  Occupation: [employer] , stands all day  Leisure: sports,exercise,kickball, bike ,hiking  Prior Level of Function: I c/ ADL's  Current Level of Function: Pt reports difficulty performing ADL's, difficulty putting socks and shoes on/putting pants on/off/standing at sink  DME owned/used: [employer]/ sports massager        Pain:  Current 4/10, worst 9/10, best 3/10   Location: right back  and buttocks  Infrequent radiation into R hip, into lateral thigh  Description: Sharp and spasm, dull  Aggravating Factors: Getting out of bed/chair, getting off floor, standing prolonged, AM, bending fwd, squatting, putting shoes/socks on, getting in/out of car, lifting R LE, reaching  Easing Factors: meds/pressure points/stretching, walking, laying on his left side  Disturbed Sleep: yes          Pattern of pain questions:  1.  Where is your pain the worst? LB  2.  Is your pain constant or intermittent? constant  3.  Does bending forward make your typical pain worse? fwd  4.  Since the start of your back pain, has there been a change in your bowel or bladder?   5.  What can't you do now that you use to be able to do? Work on his car, perform household chores, dress independently,       Pts goals: go back to exercises more, not has issues with basic function"      Red Flag Screening:   Cough  Sneeze  Strain: (+)  Bladder/ bowel: (--)  Falls: (--)  Night pain: (+)  Unexplained weight loss: (--)  General health: fair    OBJECTIVE     Postural " examination/scapula alignment: increased rounded shoulder/fwd head/inc lordosis/  Joint integrity: Firm end feeling  Skin integrity: intact  Edema: none noted  Sitting: poor  Standing: poor  Correction of posture: better with lumbar roll    MOVEMENT LOSS    ROM Loss   Flexion major loss   Extension moderate loss   Side bending Right moderate loss   Side bending Left moderate loss/max loss   Rotation Right moderate loss   Rotation Left moderate loss       Decreased IR/ER of R hip    Lower Extremity Strength  Decreased R LE strength secondary to LBP   Right LE  Left LE    Hip flexion: 4/5 Hip flexion: 5/5   Hip extension:  4/5 Hip extension: 4/5   Hip abduction: 4+/5 Hip abduction: 4+/5   Hip adduction:  4+/5 Hip adduction:  4+/5   Hip Internal rotation   3/5 Hip Internal rotation 4-/5   Knee Flexion 4+/5 Knee Flexion 4+/5   Knee Extension 4+/5 Knee Extension 4+/5   Ankle dorsiflexion: 5/5 Ankle dorsiflexion: 5/5   Ankle plantarflexion: 5/5 Ankle plantarflexion: 5/5       GAIT:  Assistive Device used: none  Level of Assistance: independent  Patient displays the following gait deviations:  no gait deviations observed.     Special Tests:   Test Name  Test Result   Prone Instability Test (--)   SI Joint Provocation Test (--)   Straight Leg Raise8 (--)   Neural Tension Test (+)   Crossed Straight Leg Raise (--)   Walking on toes (--)   Walking on heels  (--)       NEUROLOGICAL SCREENING     Sensory deficit: intact BLE's    Reflexes:    Left Right   Patella Tendon 2+ 2+   Achilles Tendon 2+ 2+   Babinski  (--) (--)   Clonus (--) (--)     REPEATED TEST MOVEMENTS:  Repeated Flexion in Standing pain during motion  worse   Repeated Extension in Standing pain during motion, end range pain, worse   Repeated Flexion in lying pain during motion, no worse   Repeated Extension in lying  pain during motion  no worse       STATIC TESTS   Sitting slouched  worse   Sitting erect better   Standing slouched worse   Standing erect  better    Lying prone in extension  better   Long sitting   not tested       Baseline Isometric Testing on Med X equipment: Testing administered by PT  Date of testin19  ROM 12 deg-33   Max Peak Torque 225    Min Peak Torque 187    Flex/Ext Ratio 1.2:1   % below normative data 7below     Start at 80 ftlbs    CMS Impairment/Limitation/Restriction for FOTO  Survey    Therapist reviewed FOTO scores for Chip Arora on 2019.   FOTO documents entered into [employer] - see Media section.    Limitation Score: 62%  Category: Mobility    Current : CL = least 60% but < 80% impaired, limited or restricted  Goal: CK = at least 40% but < 60% impaired, limited or restricted  Discharge: CK = at least 40% but < 60% impaired, limited or restricted             Treatment   Treatment Time In: 2pm  Treatment Time Out: 230pm  Total Treatment time separate from Evaluation: 30 minutes      Chip received therapeutic exercises to develop/improve posture, lumbar/cervical ROM, strength and muscular endurance for 30 minutes including the following exercises:     [facility] 2019   Visit Number 1   VAS Pain Rating 7   Femur Restraint 6   Top Dead Center 24   Counterweight 250   Lumbar Flexion 33   Lumbar Extension 12   Lumbar Peak Torque 225   Min Torque 187   Test Percent Below Normative Data 7   Lumbar Weight 60   Ice - Z Lie (in min.) 10     LTR  WES  OB        Written Home Exercises Provided: yes.  Exercises were reviewed and Chip was able to demonstrate them prior to the end of the session.  Chip demonstrated good  understanding of the education provided.     See EMR under Patient Instructions for exercises provided 2019.      Education provided:   - Patient received education regarding proper posture and body mechanics.  Patient was given top 10 tips handout which discusses posture seated, standing, lifting correctly, components of exercise, importance of nutrition and hydration, and importance of sleep.  - Martina guan  tried, recommended, and purchase information was provided.    - Patient received a handout regarding anticipated muscular soreness following the isometric test and strategies for management were reviewed with patient including stretching, using ice and scheduled rest.   - Patient received education on the [facility] program, purpose of the isometric test, progression of back strengthening as well as wellness approach and systemic strengthening.  Details of the program were discussed.  Reviewed that patient should feel support/pressure from med ex restraints but no pain or discomfort and patient expressed understanding.    Chip received cold pack for 10 minutes to LB.    Assessment   Chip is a 39 y.o. male referred to [facility] with a medical diagnosis of   M51.36 (ICD-10-CM) - DDD (degenerative disc disease), lumbar   M54.41,G89.29 (ICD-10-CM) - Chronic right-sided low back pain with right-sided sciatica     . Pt presents with decreased Lumbar ROM, decreased trunk strength, decreased flexibility, 62% FOTO disability score, and limited ADL's secondary to pain and immobility.  Pt is 7% below strength norms but has a very limited ROM.  Do Not start pt > 80ft/lbs on visit 2  Pain Pattern: 1PEP   Re-assess movement preference  Pt prognosis is Good.   Pt will benefit from skilled outpatient Physical Therapy to address the deficits stated above and in the chart below, provide pt/family education, and to maximize pt's level of independence. Based on the above history and physical examination an active physical therapy program is recommended.  Pt will continue to benefit from skilled outpatient physical therapy to address the deficits listed below in the chart, provide pt/family education and to maximize pt's level of independence in the home and community environment. .       Plan of care discussed with patient: Yes  Pt's spiritual, cultural and educational needs considered and patient is agreeable to the  plan of care and goals as stated below:     Anticipated Barriers for therapy: anxiety    PT Evaluation Completed? Yes    Medical necessity is demonstrated by the following problem list.    Pt presents with the following impairments:     History  Co-morbidities and personal factors that may impact the plan of care Co-morbidities:   anxiety, depression, diabetes and HTN    Personal Factors:   coping style     low   Examination  Body Structures and Functions, activity limitations and participation restrictions that may impact the plan of care Body Regions:   back    Body Systems:    gross symmetry  ROM  strength  gross coordinated movement  transitions  motor control    Participation Restrictions:   Standing, sit to stand, sitting, fwd bending, lifting    Activity limitations:   Learning and applying knowledge  no deficits    General Tasks and Commands  no deficits    Communication  no deficits    Mobility  lifting and carrying objects  walking    Self care  dressing    Domestic Life  shopping  cooking  doing house work (cleaning house, washing dishes, laundry)    Interactions/Relationships  no deficits    Life Areas  no deficits    Community and Social Life  community life  recreation and leisure         low   Clinical Presentation stable and uncomplicated low   Decision Making/ Complexity Score: low       GOALS: Pt is in agreement with the following goals.    Short term goals:  6 weeks or 10 visits   1.  Pt will demonstrate increased lumbar ROM by at least 3 degrees from the initial ROM value with improvements noted in functional ROM and ability to perform ADLs.  2.  Pt will demonstrate increased maximum isometric torque value by 10% when compared to the initial value resulting in improved ability to perform bending, lifting, and carrying activities safely, confidently.    3.  Patient report a reduction in worst pain score by 1-2 points for improved tolerance for .standing activities.  4.  Pt able to perform HEP  "correctly with minimal cueing or supervision from therapist to encourage independent management of symptoms.       Long term goals: 13 weeks or 20 visits   1. Pt will demonstrate increased lumbar ROM by at least 6 degrees from initial ROM value, resulting in improved ability to perform functional fwd bending while standing and sitting.   2. Pt will demonstrate increased maximum isometric torque value by 30% when compared to the initial value resulting in improved ability to perform bending, lifting, and carrying activities safely, confidently.  3. Pt to demonstrate ability to independently control and reduce their pain through posture positioning and mechanical movements throughout a typical day.  4.  Pt will demonstrate reduced pain and improved functional outcomes as reported on the FOTO by reaching a score of CK = at least 40% but < 60% impaired, limited or restricted or less in order to demonstrate subjective improvement in pt's condition.    5. Pt will demonstrate independence with the HEP at discharge  6.  Pt(patient goal)will be able to return to hiking and sports with min LBP      Plan   Outpatient physical therapy 2x week for 10 weeks or 20 visits to include the following:   - Patient education  - Therapeutic exercise  - Manual therapy  - Performance testing   - Neuromuscular Re-education  - Therapeutic activity   - Modalities    Pt may be seen by PTA as part of the rehabilitation team.     Therapist: Nita Foreman, PT  8/2/2019    "I certify the need for these services furnished under this plan of treatment and while under my care."    ____________________________________  Physician/Referring Practitioner    _______________  Date of Signature          "

## 2019-08-03 ENCOUNTER — PATIENT MESSAGE (OUTPATIENT)
Dept: ENDOCRINOLOGY | Facility: CLINIC | Age: 39
End: 2019-08-03

## 2019-08-05 DIAGNOSIS — Z79.4 TYPE 2 DIABETES MELLITUS WITHOUT COMPLICATION, WITH LONG-TERM CURRENT USE OF INSULIN: ICD-10-CM

## 2019-08-05 DIAGNOSIS — E11.9 TYPE 2 DIABETES MELLITUS WITHOUT COMPLICATION, WITH LONG-TERM CURRENT USE OF INSULIN: ICD-10-CM

## 2019-08-09 ENCOUNTER — CLINICAL SUPPORT (OUTPATIENT)
Dept: REHABILITATION | Facility: OTHER | Age: 39
End: 2019-08-09
Attending: PHYSICAL MEDICINE & REHABILITATION
Payer: COMMERCIAL

## 2019-08-09 DIAGNOSIS — M62.81 WEAKNESS OF TRUNK MUSCULATURE: ICD-10-CM

## 2019-08-09 DIAGNOSIS — G89.29 CHRONIC RIGHT-SIDED LOW BACK PAIN WITH RIGHT-SIDED SCIATICA: ICD-10-CM

## 2019-08-09 DIAGNOSIS — M54.41 CHRONIC RIGHT-SIDED LOW BACK PAIN WITH RIGHT-SIDED SCIATICA: ICD-10-CM

## 2019-08-09 PROCEDURE — 97110 THERAPEUTIC EXERCISES: CPT

## 2019-08-09 NOTE — PROGRESS NOTES
"[facility] Physical Therapy Treatment      Name: Chip Arora  Clinic Number: [id number]    Therapy Diagnosis: No diagnosis found.  Physician: Miriam Hughes, *    Visit Date: 2019    Evaluation Date: 2019  Authorization Period Expiration: 19  Plan of Care Expiration: 19  Reassessment Due: 19  Visit # / Visits authorized:      Time In: 7:00pm  Time Out: 8:00pm  Total Billable Time: 50 minutes     Precautions: Standard and Diabetes, HTN, anxiety,depression     Pattern of pain determined: 1          PEP       Subjective   Chip reports same pain he has been having.  He gets more LBP with not moving.     Patient reports tolerating previous visit minimal DOMS.  Patient reports their pain to be 3/10 on a 0-10 scale with 0 being no pain and 10 being the worst pain imaginable.  Pain Location: LB     Leisure: sports,exercise,kickball, bike ,hiking  Prior Level of Function: I c/ ADL's  Pts goals: go back to exercises more, not has issues with basic function"     Objective     Baseline Isometric Testing on Med X equipment: Testing administered by PT  Date of testin19  ROM 12 deg-33   Max Peak Torque 225    Min Peak Torque 187    Flex/Ext Ratio 1.2:1   % below normative data 7below         Treatment    Pt was instructed in and performed the following:     Chip received therapeutic exercises to develop/improved posture, cardiovascular endurance, muscular endurance, lumbar/cervical ROM, strength and muscular endurance for 60 minutes including the following exercises:       [facility] 2019   Visit Number 2   VAS Pain Rating 5   Recumbent Bike Seat Pos. 10   Femur Restraint -   Top Dead Center -   Counterweight -   Lumbar Flexion -   Lumbar Extension -   Lumbar Peak Torque -   Min Torque -   Test Percent Below Normative Data -   Lumbar Weight 80   Repetitions 20   Rating of Perceived Exertion 3   Ice - Z Lie (in min.) 10   EOE 10x  LTR 10x  Seated Rotation " 10x  Scapular retracions 10x            Peripheral muscle strengthening which included 1 set of 15-20 repetitions at a slow, controlled 10-13 second per rep pace focused on strengthening supporting musculature for improved body mechanics and functional mobility.  Pt and therapist focused on proper form during treatment to ensure optimal strengthening of each targeted muscle group.  Machines were utilized including torso rotation, leg extension, leg curl, chest press, upright row. Tricep extension, bicep curl, leg press, and hip abduction added visit 3    Chip received the following manual therapy techniques: n/a were applied to the: n/a for n/a minutes.         Home Exercises Provided and Patient Education Provided   EOE 10x  LTR 10x  Seated Rotation 10x  Scapular retracions 10x    Education provided:   - Educated pt on the importance of daily stretch to increase the benefit of program and positive results.     Written Home Exercises Provided: yes.  Exercises were reviewed and Cihp was able to demonstrate them prior to the end of the session.  Chip demonstrated good  understanding of the education provided.     See EMR under Patient Instructions for exercises provided prior visit.          Assessment   Pt with moderate LBP that did decrease a little during and post session. Reviewed HEP with mod vc for tech.Added scapular retractions due to thoracic pain.  Pt tolerated lumbar medx machine with starting weight less than 50% due to max peak torque high with no c/o pain. Pt tolerated the medx machines well to the upright row with no c/o increased LB pain or any limb pain.        Patient is making good progress towards established goals.  Pt will continue to benefit from skilled outpatient physical therapy to address the deficits stated in the impairment chart, provide pt/family education and to maximize pt's level of independence in the home and community environment.     Anticipated Barriers for therapy: none  Pt's  spiritual, cultural and educational needs considered and pt agreeable to plan of care and goals as stated below:           GOALS: Pt is in agreement with the following goals.     Short term goals:  6 weeks or 10 visits   1.  Pt will demonstrate increased lumbar ROM by at least 3 degrees from the initial ROM value with improvements noted in functional ROM and ability to perform ADLs.  2.  Pt will demonstrate increased maximum isometric torque value by 10% when compared to the initial value resulting in improved ability to perform bending, lifting, and carrying activities safely, confidently.     3.  Patient report a reduction in worst pain score by 1-2 points for improved tolerance for .standing activities.  4.  Pt able to perform HEP correctly with minimal cueing or supervision from therapist to encourage independent management of symptoms.         Long term goals: 13 weeks or 20 visits   1. Pt will demonstrate increased lumbar ROM by at least 6 degrees from initial ROM value, resulting in improved ability to perform functional fwd bending while standing and sitting.   2. Pt will demonstrate increased maximum isometric torque value by 30% when compared to the initial value resulting in improved ability to perform bending, lifting, and carrying activities safely, confidently.  3. Pt to demonstrate ability to independently control and reduce their pain through posture positioning and mechanical movements throughout a typical day.  4.  Pt will demonstrate reduced pain and improved functional outcomes as reported on the FOTO by reaching a score of CK = at least 40% but < 60% impaired, limited or restricted or less in order to demonstrate subjective improvement in pt's condition.    5. Pt will demonstrate independence with the HEP at discharge  6.  Pt(patient goal)will be able to return to hiking and sports with min LBP          Plan   Continue with established Plan of Care towards established PT goals.

## 2019-08-12 ENCOUNTER — CLINICAL SUPPORT (OUTPATIENT)
Dept: REHABILITATION | Facility: OTHER | Age: 39
End: 2019-08-12
Attending: PHYSICAL MEDICINE & REHABILITATION
Payer: COMMERCIAL

## 2019-08-12 DIAGNOSIS — M54.41 CHRONIC RIGHT-SIDED LOW BACK PAIN WITH RIGHT-SIDED SCIATICA: ICD-10-CM

## 2019-08-12 DIAGNOSIS — G89.29 CHRONIC RIGHT-SIDED LOW BACK PAIN WITH RIGHT-SIDED SCIATICA: ICD-10-CM

## 2019-08-12 DIAGNOSIS — M62.81 WEAKNESS OF TRUNK MUSCULATURE: ICD-10-CM

## 2019-08-12 LAB
LEFT EYE DM RETINOPATHY: POSITIVE
RIGHT EYE DM RETINOPATHY: POSITIVE

## 2019-08-12 PROCEDURE — 97110 THERAPEUTIC EXERCISES: CPT

## 2019-08-12 NOTE — PROGRESS NOTES
"[facility]      Name: Chip Arora  Clinic Number: [id number]    Therapy Diagnosis: No diagnosis found.  Physician: Miriam Hughes, *    Visit Date: 2019    Evaluation Date: 2019  Authorization Period Expiration: 19  Plan of Care Expiration: 19  Reassessment Due: 19  Visit # / Visits authorized: 3/ 20     Time In: 4:00pm  Time Out: 5:00pm  Total Billable Time: 50 minutes     Precautions: Standard and Diabetes, HTN, anxiety,depression     Pattern of pain determined: 1          PEP       Subjective   Chip reports having mid and lower back pain due to sitting in an eye exam chair for a while.     Patient reports tolerating previous visit minimal DOMS.  Patient reports their pain to be 4/10 on a 0-10 scale with 0 being no pain and 10 being the worst pain imaginable.  Pain Location: LB     Leisure: sports,exercise,kickball, bike ,hiking  Prior Level of Function: I c/ ADL's  Pts goals: go back to exercises more, not has issues with basic function"     Objective     Baseline Isometric Testing on Med X equipment: Testing administered by PT  Date of testin19  ROM 12 deg-33   Max Peak Torque 225    Min Peak Torque 187    Flex/Ext Ratio 1.2:1   % below normative data 7below         Treatment    Pt was instructed in and performed the following:     Chip received therapeutic exercises to develop/improved posture, cardiovascular endurance, muscular endurance, lumbar/cervical ROM, strength and muscular endurance for 60 minutes including the following exercises:     [facility] 2019   Visit Number 3   VAS Pain Rating 4   Recumbent Bike Seat Pos. 10   Lumbar Weight 84   Repetitions 20   Rating of Perceived Exertion 3   Ice - Z Lie (in min.) 10   EOE 10x  LTR 10x  Seated Rotation 10x  Scapular retracions 10x  EIS with towel  Open Book 10x        Peripheral muscle strengthening which included 1 set of 15-20 repetitions at a slow, controlled 10-13 " second per rep pace focused on strengthening supporting musculature for improved body mechanics and functional mobility.  Pt and therapist focused on proper form during treatment to ensure optimal strengthening of each targeted muscle group.  Machines were utilized including torso rotation, leg extension, leg curl, chest press, upright row. Tricep extension, bicep curl, leg press, and hip abduction added visit 3    Chip received the following manual therapy techniques: n/a were applied to the: n/a for n/a minutes.         Home Exercises Provided and Patient Education Provided   EIL 10x  EIS 10x with towel  LTR 10x  Scapular retracions 10x  Open Book 10x  Piriformis stretch 3x 20 secs    Education provided:   - Educated pt on the importance of daily stretch to increase the benefit of program and positive results.     Written Home Exercises Provided: yes.  Exercises were reviewed and Chip was able to demonstrate them prior to the end of the session.  Chip demonstrated good  understanding of the education provided.     See EMR under Patient Instructions for exercises provided prior visit.          Assessment   Pt with moderate mid back/LBP and glut sciatic  that did decrease a little during and post session. Reviewed HEP with min v/c for tech. Added open Book which he liked and piriformis stretch. Pt with R sciatic pain.  Pt tolerated lumbar medx machine with a weight increase and no c/o pain. Pt tolerated the medx machines well with no c/o increased LB pain or any limb pain.         Patient is making good progress towards established goals.  Pt will continue to benefit from skilled outpatient physical therapy to address the deficits stated in the impairment chart, provide pt/family education and to maximize pt's level of independence in the home and community environment.     Anticipated Barriers for therapy: none  Pt's spiritual, cultural and educational needs considered and pt agreeable to plan of care and goals  as stated below:           GOALS: Pt is in agreement with the following goals.     Short term goals:  6 weeks or 10 visits   1.  Pt will demonstrate increased lumbar ROM by at least 3 degrees from the initial ROM value with improvements noted in functional ROM and ability to perform ADLs.  2.  Pt will demonstrate increased maximum isometric torque value by 10% when compared to the initial value resulting in improved ability to perform bending, lifting, and carrying activities safely, confidently.     3.  Patient report a reduction in worst pain score by 1-2 points for improved tolerance for .standing activities.  4.  Pt able to perform HEP correctly with minimal cueing or supervision from therapist to encourage independent management of symptoms.         Long term goals: 13 weeks or 20 visits   1. Pt will demonstrate increased lumbar ROM by at least 6 degrees from initial ROM value, resulting in improved ability to perform functional fwd bending while standing and sitting.   2. Pt will demonstrate increased maximum isometric torque value by 30% when compared to the initial value resulting in improved ability to perform bending, lifting, and carrying activities safely, confidently.  3. Pt to demonstrate ability to independently control and reduce their pain through posture positioning and mechanical movements throughout a typical day.  4.  Pt will demonstrate reduced pain and improved functional outcomes as reported on the FOTO by reaching a score of CK = at least 40% but < 60% impaired, limited or restricted or less in order to demonstrate subjective improvement in pt's condition.    5. Pt will demonstrate independence with the HEP at discharge  6.  Pt(patient goal)will be able to return to hiking and sports with min LBP          Plan   Continue with established Plan of Care towards established PT goals.

## 2019-08-16 ENCOUNTER — CLINICAL SUPPORT (OUTPATIENT)
Dept: REHABILITATION | Facility: OTHER | Age: 39
End: 2019-08-16
Attending: PHYSICAL MEDICINE & REHABILITATION
Payer: COMMERCIAL

## 2019-08-16 DIAGNOSIS — G89.29 CHRONIC RIGHT-SIDED LOW BACK PAIN WITH RIGHT-SIDED SCIATICA: ICD-10-CM

## 2019-08-16 DIAGNOSIS — M54.41 CHRONIC RIGHT-SIDED LOW BACK PAIN WITH RIGHT-SIDED SCIATICA: ICD-10-CM

## 2019-08-16 DIAGNOSIS — M62.81 WEAKNESS OF TRUNK MUSCULATURE: ICD-10-CM

## 2019-08-16 PROCEDURE — 97110 THERAPEUTIC EXERCISES: CPT

## 2019-08-16 NOTE — PROGRESS NOTES
"[facility]      Name: Chip Arora  Clinic Number: [id number]    Therapy Diagnosis:   Encounter Diagnoses   Name Primary?    Weakness of trunk musculature     Chronic right-sided low back pain with right-sided sciatica      Physician: Miriam Hughes, *    Visit Date: 2019    Evaluation Date: 2019  Authorization Period Expiration: 19  Plan of Care Expiration: 19  Reassessment Due: 19  Visit # / Visits authorized:  (Increase extension next visit)     Time In: 10:07  Time Out: 11:00  Total Billable Time: 50 minutes     Precautions: Standard and Diabetes, HTN, anxiety,depression     Pattern of pain determined: 1          PEP       Subjective   Chip reports he is always a little sore in the morning, so he cannot tell if his pain or discomfort has changed at all. Pain in the thoracic and upper lumbar region.      Patient reports tolerating previous visit minimal DOMS.  Patient reports their pain to be 3/10 on a 0-10 scale with 0 being no pain and 10 being the worst pain imaginable.  Pain Location: LB     Leisure: sports,exercise,kickball, bike ,hiking  Prior Level of Function: I c/ ADL's  Pts goals: go back to exercises more, not has issues with basic function"     Objective     Baseline Isometric Testing on Med X equipment: Testing administered by PT  Date of testin19  ROM 12 deg-33   Max Peak Torque 225    Min Peak Torque 187    Flex/Ext Ratio 1.2:1   % below normative data 7below         Treatment    Pt was instructed in and performed the following:     Chip received therapeutic exercises to develop/improved posture, cardiovascular endurance, muscular endurance, lumbar/cervical ROM, strength and muscular endurance for 60 minutes including the following exercises:     [facility] 2019   Visit Number 4   VAS Pain Rating 3   Recumbent Bike Seat Pos. 5   Manual Therapy 5   Femur Restraint -   Top Dead Center -   Counterweight - "   Lumbar Flexion -   Lumbar Extension -   Lumbar Peak Torque -   Min Torque -   Test Percent Below Normative Data -   Lumbar Weight 88   Repetitions 17   Rating of Perceived Exertion 3   Ice - Z Lie (in min.) 10       Exercises completd this visit:  EOE 10x  LTR 10x  Open Book 10x    Exercises completed on previous visits/HEP:    Seated Rotation 10x  Scapular retracions 10x  EIS with towel          Peripheral muscle strengthening which included 1 set of 15-20 repetitions at a slow, controlled 10-13 second per rep pace focused on strengthening supporting musculature for improved body mechanics and functional mobility.  Pt and therapist focused on proper form during treatment to ensure optimal strengthening of each targeted muscle group.  Machines were utilized including torso rotation, leg extension, leg curl, chest press, upright row. Tricep extension, bicep curl, leg press, and hip abduction added visit 3    Chip received the following manual therapy techniques: PA glides to lower thoracic and upper lumbar region to promote improved extension mobility 5 min         Home Exercises Provided and Patient Education Provided   EIL 10x  EIS 10x with towel  LTR 10x  Scapular retracions 10x  Open Book 10x  Piriformis stretch 3x 20 secs    Education provided:   - Educated pt on the importance of daily stretch to increase the benefit of program and positive results.     Written Home Exercises Provided: yes.  Exercises were reviewed and Chip was able to demonstrate them prior to the end of the session.  Chip demonstrated good  understanding of the education provided.     See EMR under Patient Instructions for exercises provided prior visit.          Assessment   Pt presented with moderate mid back/LBP that decreased post exercises and pt stated that it increased and he began having spasms when on the ice. On the lumbar medx he was able to increase resistance by 5% and reprots that he feels like when going into flexion he  feels like he can go father forward and when in extension he extends past 12 deg, will adjust ROM next visit as tolerated and maintain weight.       Patient is making good progress towards established goals.  Pt will continue to benefit from skilled outpatient physical therapy to address the deficits stated in the impairment chart, provide pt/family education and to maximize pt's level of independence in the home and community environment.     Anticipated Barriers for therapy: none  Pt's spiritual, cultural and educational needs considered and pt agreeable to plan of care and goals as stated below:           GOALS: Pt is in agreement with the following goals.     Short term goals:  6 weeks or 10 visits   1.  Pt will demonstrate increased lumbar ROM by at least 3 degrees from the initial ROM value with improvements noted in functional ROM and ability to perform ADLs.  2.  Pt will demonstrate increased maximum isometric torque value by 10% when compared to the initial value resulting in improved ability to perform bending, lifting, and carrying activities safely, confidently.     3.  Patient report a reduction in worst pain score by 1-2 points for improved tolerance for .standing activities.  4.  Pt able to perform HEP correctly with minimal cueing or supervision from therapist to encourage independent management of symptoms.         Long term goals: 13 weeks or 20 visits   1. Pt will demonstrate increased lumbar ROM by at least 6 degrees from initial ROM value, resulting in improved ability to perform functional fwd bending while standing and sitting.   2. Pt will demonstrate increased maximum isometric torque value by 30% when compared to the initial value resulting in improved ability to perform bending, lifting, and carrying activities safely, confidently.  3. Pt to demonstrate ability to independently control and reduce their pain through posture positioning and mechanical movements throughout a typical day.  4.   Pt will demonstrate reduced pain and improved functional outcomes as reported on the FOTO by reaching a score of CK = at least 40% but < 60% impaired, limited or restricted or less in order to demonstrate subjective improvement in pt's condition.    5. Pt will demonstrate independence with the HEP at discharge  6.  Pt(patient goal)will be able to return to hiking and sports with min LBP          Plan   Continue with established Plan of Care towards established PT goals.

## 2019-08-19 ENCOUNTER — CLINICAL SUPPORT (OUTPATIENT)
Dept: REHABILITATION | Facility: OTHER | Age: 39
End: 2019-08-19
Attending: PHYSICAL MEDICINE & REHABILITATION
Payer: COMMERCIAL

## 2019-08-19 DIAGNOSIS — M54.41 CHRONIC RIGHT-SIDED LOW BACK PAIN WITH RIGHT-SIDED SCIATICA: ICD-10-CM

## 2019-08-19 DIAGNOSIS — G89.29 CHRONIC RIGHT-SIDED LOW BACK PAIN WITH RIGHT-SIDED SCIATICA: ICD-10-CM

## 2019-08-19 DIAGNOSIS — M62.81 WEAKNESS OF TRUNK MUSCULATURE: ICD-10-CM

## 2019-08-19 PROCEDURE — 97110 THERAPEUTIC EXERCISES: CPT

## 2019-08-19 NOTE — PROGRESS NOTES
"[facility]      Name: Chip Arora  Clinic Number: [id number]    Therapy Diagnosis:   Encounter Diagnoses   Name Primary?    Weakness of trunk musculature     Chronic right-sided low back pain with right-sided sciatica      Physician: Miriam Hughes, *    Visit Date: 2019    Evaluation Date: 2019  Authorization Period Expiration: 19  Plan of Care Expiration: 19  Reassessment Due: 19  Visit # / Visits authorized:       Time In: 1:30  Time Out: 2:20  Total Billable Time: 50 minutes     Precautions: Standard and Diabetes, HTN, anxiety,depression     Pattern of pain determined: 1          PEP       Subjective   Chip reports he is feeling more moderate pain at rest about 2/10 and it is about 4/10 with activity (bending, lifting, transitioning)      Patient reports tolerating previous visit minimal DOMS.  Patient reports their pain to be 2/10 on a 0-10 scale with 0 being no pain and 10 being the worst pain imaginable.  Pain Location: LB     Leisure: sports,exercise,kickball, bike ,hiking  Prior Level of Function: I c/ ADL's  Pts goals: go back to exercises more, not has issues with basic function"     Objective     Baseline Isometric Testing on Med X equipment: Testing administered by PT  Date of testin19  ROM 12 deg-33   Max Peak Torque 225    Min Peak Torque 187    Flex/Ext Ratio 1.2:1   % below normative data 7below         Treatment    Pt was instructed in and performed the following:     Chip received therapeutic exercises to develop/improved posture, cardiovascular endurance, muscular endurance, lumbar/cervical ROM, strength and muscular endurance for 60 minutes including the following exercises:     [facility] 2019   Visit Number 5   VAS Pain Rating 2   Recumbent Bike Seat Pos. -   Time 10   Extension in Lying 10   Manual Therapy 5   Femur Restraint -   Top Dead Center -   Counterweight -   Lumbar Flexion 39   Lumbar " Extension 3   Lumbar Peak Torque -   Min Torque -   Test Percent Below Normative Data -   Lumbar Weight 88   Repetitions 20   Rating of Perceived Exertion 2   Ice - Z Lie (in min.) 10         Exercises completd this visit:  EOE 10x  LTR 10x  Open Book 10x  Thoraic extension over foam    Exercises completed on previous visits/HEP:    Seated Rotation 10x  Scapular retracions 10x  EIS with towel          Peripheral muscle strengthening which included 1 set of 15-20 repetitions at a slow, controlled 10-13 second per rep pace focused on strengthening supporting musculature for improved body mechanics and functional mobility.  Pt and therapist focused on proper form during treatment to ensure optimal strengthening of each targeted muscle group.  Machines were utilized including torso rotation, leg extension, leg curl, chest press, upright row. Tricep extension, bicep curl, leg press, and hip abduction added visit 3    Chip received the following manual therapy techniques: PA glides to lower thoracic and upper lumbar region to promote improved extension mobility, L opening rotation mob 5 min         Home Exercises Provided and Patient Education Provided   EIL 10x  EIS 10x with towel  LTR 10x  Scapular retracions 10x  Open Book 10x  Piriformis stretch 3x 20 secs    Education provided:   - Educated pt on the importance of daily stretch to increase the benefit of program and positive results.     Written Home Exercises Provided: yes.  Exercises were reviewed and Chip was able to demonstrate them prior to the end of the session.  Chip demonstrated good  understanding of the education provided.     See EMR under Patient Instructions for exercises provided prior visit.          Assessment   Pt presented with moderate mid back/LBP that decreased post exercises, pt feels that his L lumbar rotation is not progressing as well as the R, attempted mobs to open the L side with minimal improvement. Added thoracic extension with foam  roll without discomfort pt stated he can feel the restriction. Increased ROM on the lumbar extension medx he was able to increase maintain resistance and increase repetitions and reported feeling better at the increased ROM than he was at the previous ROM, will adjust resistance next visit as tolerated.       Patient is making good progress towards established goals.  Pt will continue to benefit from skilled outpatient physical therapy to address the deficits stated in the impairment chart, provide pt/family education and to maximize pt's level of independence in the home and community environment.     Anticipated Barriers for therapy: none  Pt's spiritual, cultural and educational needs considered and pt agreeable to plan of care and goals as stated below:           GOALS: Pt is in agreement with the following goals.     Short term goals:  6 weeks or 10 visits   1.  Pt will demonstrate increased lumbar ROM by at least 3 degrees from the initial ROM value with improvements noted in functional ROM and ability to perform ADLs.  2.  Pt will demonstrate increased maximum isometric torque value by 10% when compared to the initial value resulting in improved ability to perform bending, lifting, and carrying activities safely, confidently.     3.  Patient report a reduction in worst pain score by 1-2 points for improved tolerance for .standing activities.  4.  Pt able to perform HEP correctly with minimal cueing or supervision from therapist to encourage independent management of symptoms.         Long term goals: 13 weeks or 20 visits   1. Pt will demonstrate increased lumbar ROM by at least 6 degrees from initial ROM value, resulting in improved ability to perform functional fwd bending while standing and sitting.   2. Pt will demonstrate increased maximum isometric torque value by 30% when compared to the initial value resulting in improved ability to perform bending, lifting, and carrying activities safely,  confidently.  3. Pt to demonstrate ability to independently control and reduce their pain through posture positioning and mechanical movements throughout a typical day.  4.  Pt will demonstrate reduced pain and improved functional outcomes as reported on the FOTO by reaching a score of CK = at least 40% but < 60% impaired, limited or restricted or less in order to demonstrate subjective improvement in pt's condition.    5. Pt will demonstrate independence with the HEP at discharge  6.  Pt(patient goal)will be able to return to hiking and sports with min LBP          Plan   Continue with established Plan of Care towards established PT goals.

## 2019-08-22 ENCOUNTER — CLINICAL SUPPORT (OUTPATIENT)
Dept: REHABILITATION | Facility: OTHER | Age: 39
End: 2019-08-22
Attending: PHYSICAL MEDICINE & REHABILITATION
Payer: COMMERCIAL

## 2019-08-22 DIAGNOSIS — M54.41 CHRONIC RIGHT-SIDED LOW BACK PAIN WITH RIGHT-SIDED SCIATICA: ICD-10-CM

## 2019-08-22 DIAGNOSIS — G89.29 CHRONIC RIGHT-SIDED LOW BACK PAIN WITH RIGHT-SIDED SCIATICA: ICD-10-CM

## 2019-08-22 DIAGNOSIS — M62.81 WEAKNESS OF TRUNK MUSCULATURE: ICD-10-CM

## 2019-08-22 PROCEDURE — 97110 THERAPEUTIC EXERCISES: CPT

## 2019-08-22 NOTE — PROGRESS NOTES
"[facility]      Name: Chip Arora  Clinic Number: [id number]    Therapy Diagnosis:   No diagnosis found.  Physician: Miriam Hughes, *    Visit Date: 2019    Evaluation Date: 2019  Authorization Period Expiration: 19  Plan of Care Expiration: 19  Reassessment Due: 19  Visit # / Visits authorized:       Time In: 3:00  Time Out: 4:00  Total Billable Time: 50 minutes     Precautions: Standard and Diabetes, HTN, anxiety,depression     Pattern of pain determined: 1          PEP       Subjective   Chip reports his mid back is less painful, but still has LBP.     Patient reports tolerating previous visit minimal DOMS.  Patient reports their pain to be 2/10 on a 0-10 scale with 0 being no pain and 10 being the worst pain imaginable.  Pain Location: LB     Leisure: sports,exercise,kickball, bike ,hiking  Prior Level of Function: I c/ ADL's  Pts goals: go back to exercises more, not has issues with basic function"     Objective     Baseline Isometric Testing on Med X equipment: Testing administered by PT  Date of testin19  ROM 12 deg-33   Max Peak Torque 225    Min Peak Torque 187    Flex/Ext Ratio 1.2:1   % below normative data 7below         Treatment    Pt was instructed in and performed the following:     Chip received therapeutic exercises to develop/improved posture, cardiovascular endurance, muscular endurance, lumbar/cervical ROM, strength and muscular endurance for 60 minutes including the following exercises:         [facility] 2019   Visit Number 6   VAS Pain Rating 2   Recumbent Bike Seat Pos. -   Time 10   Extension in Lying -   Manual Therapy 5   Femur Restraint -   Top Dead Center -   Counterweight -   Lumbar Flexion -   Lumbar Extension -   Lumbar Peak Torque -   Min Torque -   Test Percent Below Normative Data -   Lumbar Weight 88   Repetitions 20   Rating of Perceived Exertion 2   Ice - Z Lie (in min.) 10 "       Exercises completd this visit:  EOE 10x  LTR 10x  Open Book 10x  Thoraic extension over foam    Exercises completed on previous visits/HEP:    Seated Rotation 10x  Scapular retracions 10x  EIS with towel      Manual: Vacuum/cupping STM with manual therapy techniques was performed to mid back/L LB to decrease muscle tightness, increase circulation and promote healing process. The pt's skin was monitored for redness adjusting pressure as needed. The pt was instructed in possible side effects of bruising and/or soreness. 5 min        Peripheral muscle strengthening which included 1 set of 15-20 repetitions at a slow, controlled 10-13 second per rep pace focused on strengthening supporting musculature for improved body mechanics and functional mobility.  Pt and therapist focused on proper form during treatment to ensure optimal strengthening of each targeted muscle group.  Machines were utilized including torso rotation, leg extension, leg curl, chest press, upright row. Tricep extension, bicep curl, leg press, and hip abduction added visit 3    Chip received the following manual therapy techniques: None    Home Exercises Provided and Patient Education Provided   EIL 10x  EIS 10x with towel  LTR 10x  Scapular retracions 10x  Open Book 10x  Piriformis stretch 3x 20 secs    Education provided:   - Educated pt on the importance of daily stretch to increase the benefit of program and positive results.     Written Home Exercises Provided: yes.  Exercises were reviewed and Chip was able to demonstrate them prior to the end of the session.  Chip demonstrated good  understanding of the education provided.     See EMR under Patient Instructions for exercises provided prior visit.          Assessment   Pt presented with LBP that decreased post exercise post cupping and session. He received cupping that decreased his LBP today. PTA forgot to increase weight today. Increased weight 10 % next session due to RPE 2.       Patient is making good progress towards established goals.  Pt will continue to benefit from skilled outpatient physical therapy to address the deficits stated in the impairment chart, provide pt/family education and to maximize pt's level of independence in the home and community environment.     Anticipated Barriers for therapy: none  Pt's spiritual, cultural and educational needs considered and pt agreeable to plan of care and goals as stated below:           GOALS: Pt is in agreement with the following goals.     Short term goals:  6 weeks or 10 visits   1.  Pt will demonstrate increased lumbar ROM by at least 3 degrees from the initial ROM value with improvements noted in functional ROM and ability to perform ADLs.  2.  Pt will demonstrate increased maximum isometric torque value by 10% when compared to the initial value resulting in improved ability to perform bending, lifting, and carrying activities safely, confidently.     3.  Patient report a reduction in worst pain score by 1-2 points for improved tolerance for .standing activities.  4.  Pt able to perform HEP correctly with minimal cueing or supervision from therapist to encourage independent management of symptoms.         Long term goals: 13 weeks or 20 visits   1. Pt will demonstrate increased lumbar ROM by at least 6 degrees from initial ROM value, resulting in improved ability to perform functional fwd bending while standing and sitting.   2. Pt will demonstrate increased maximum isometric torque value by 30% when compared to the initial value resulting in improved ability to perform bending, lifting, and carrying activities safely, confidently.  3. Pt to demonstrate ability to independently control and reduce their pain through posture positioning and mechanical movements throughout a typical day.  4.  Pt will demonstrate reduced pain and improved functional outcomes as reported on the FOTO by reaching a score of CK = at least 40% but < 60%  impaired, limited or restricted or less in order to demonstrate subjective improvement in pt's condition.    5. Pt will demonstrate independence with the HEP at discharge  6.  Pt(patient goal)will be able to return to hiking and sports with min LBP          Plan   Continue with established Plan of Care towards established PT goals.

## 2019-08-23 ENCOUNTER — LAB VISIT (OUTPATIENT)
Dept: LAB | Facility: HOSPITAL | Age: 39
End: 2019-08-23
Attending: INTERNAL MEDICINE
Payer: COMMERCIAL

## 2019-08-23 DIAGNOSIS — E11.9 TYPE 2 DIABETES MELLITUS WITHOUT COMPLICATION, WITH LONG-TERM CURRENT USE OF INSULIN: ICD-10-CM

## 2019-08-23 DIAGNOSIS — Z79.4 TYPE 2 DIABETES MELLITUS WITHOUT COMPLICATION, WITH LONG-TERM CURRENT USE OF INSULIN: ICD-10-CM

## 2019-08-23 LAB
CHOLEST SERPL-MCNC: 212 MG/DL (ref 120–199)
CHOLEST/HDLC SERPL: 6.2 {RATIO} (ref 2–5)
ESTIMATED AVG GLUCOSE: 160 MG/DL (ref 68–131)
HBA1C MFR BLD HPLC: 7.2 % (ref 4–5.6)
HDLC SERPL-MCNC: 34 MG/DL (ref 40–75)
HDLC SERPL: 16 % (ref 20–50)
LDLC SERPL CALC-MCNC: 149.8 MG/DL (ref 63–159)
NONHDLC SERPL-MCNC: 178 MG/DL
TRIGL SERPL-MCNC: 141 MG/DL (ref 30–150)

## 2019-08-23 PROCEDURE — 80061 LIPID PANEL: CPT

## 2019-08-23 PROCEDURE — 36415 COLL VENOUS BLD VENIPUNCTURE: CPT | Mod: PO

## 2019-08-23 PROCEDURE — 83036 HEMOGLOBIN GLYCOSYLATED A1C: CPT

## 2019-08-28 ENCOUNTER — PATIENT MESSAGE (OUTPATIENT)
Dept: ENDOCRINOLOGY | Facility: HOSPITAL | Age: 39
End: 2019-08-28

## 2019-08-29 ENCOUNTER — CLINICAL SUPPORT (OUTPATIENT)
Dept: REHABILITATION | Facility: OTHER | Age: 39
End: 2019-08-29
Attending: PHYSICAL MEDICINE & REHABILITATION
Payer: COMMERCIAL

## 2019-08-29 DIAGNOSIS — M54.41 CHRONIC RIGHT-SIDED LOW BACK PAIN WITH RIGHT-SIDED SCIATICA: ICD-10-CM

## 2019-08-29 DIAGNOSIS — G89.29 CHRONIC RIGHT-SIDED LOW BACK PAIN WITH RIGHT-SIDED SCIATICA: ICD-10-CM

## 2019-08-29 DIAGNOSIS — M62.81 WEAKNESS OF TRUNK MUSCULATURE: ICD-10-CM

## 2019-08-29 PROCEDURE — 97110 THERAPEUTIC EXERCISES: CPT

## 2019-08-29 NOTE — PROGRESS NOTES
"[facility]      Name: Chip Arora  Clinic Number: [id number]    Therapy Diagnosis:   Encounter Diagnoses   Name Primary?    Weakness of trunk musculature     Chronic right-sided low back pain with right-sided sciatica      Physician: Miriam Hughes, *    Visit Date: 2019    Evaluation Date: 2019  Authorization Period Expiration: 19  Plan of Care Expiration: 19  Reassessment Due: 19  Visit # / Visits authorized:       Time In: 1000am  Time Out: 1100am  Total Billable Time: 40 minutes     Precautions: Standard and Diabetes, HTN, anxiety,depression     Pattern of pain determined: 1          PEP       Subjective   Chip reports he feels improvement in his LB and is having less pain overall    Patient reports tolerating previous visit minimal DOMS.  Patient reports their pain to be 3/10 on a 0-10 scale with 0 being no pain and 10 being the worst pain imaginable.  Pain Location: LB     Leisure: sports,exercise,kickball, bike ,hiking  Prior Level of Function: I c/ ADL's  Pts goals: go back to exercises more, not has issues with basic function"     Objective     Baseline Isometric Testing on Med X equipment: Testing administered by PT  Date of testin19  ROM 0-42   Max Peak Torque 225    Min Peak Torque 187    Flex/Ext Ratio 1.2:1   % below normative data 7below         Treatment    Pt was instructed in and performed the following:     Chip received therapeutic exercises to develop/improved posture, cardiovascular endurance, muscular endurance, lumbar/cervical ROM, strength and muscular endurance for 60 minutes including the following exercises:     [facility] 2019   Visit Number 7   VAS Pain Rating 3   Recumbent Bike Seat Pos. -   Time 10   Extension in Lying 10   Manual Therapy -   Femur Restraint -   Top Dead Center -   Counterweight -   Lumbar Flexion 42   Lumbar Extension 0   Lumbar Peak Torque -   Min Torque -   Test " Percent Below Normative Data -   Lumbar Weight 88   Repetitions 20   Rating of Perceived Exertion 3   Ice - Z Lie (in min.) 10         Exercises completd this visit:  EOE 10x  LTR 10x  Open Book 10x  Thoraic extension over foam  Quad thoracic rotation  Exercises completed on previous visits/HEP:    Seated Rotation 10x  Scapular retracions 10x  EIS with towel      Manual: Vacuum/cupping STM with manual therapy techniques was performed to mid back/L LB to decrease muscle tightness, increase circulation and promote healing process. The pt's skin was monitored for redness adjusting pressure as needed. The pt was instructed in possible side effects of bruising and/or soreness. 0 min        Peripheral muscle strengthening which included 1 set of 15-20 repetitions at a slow, controlled 10-13 second per rep pace focused on strengthening supporting musculature for improved body mechanics and functional mobility.  Pt and therapist focused on proper form during treatment to ensure optimal strengthening of each targeted muscle group.  Machines were utilized including torso rotation, leg extension, leg curl, chest press, upright row. Tricep extension, bicep curl, leg press, and hip abduction added visit 3    Chip received the following manual therapy techniques: None    Home Exercises Provided and Patient Education Provided   EIL 10x  EIS 10x with towel  LTR 10x  Scapular retracions 10x  Open Book 10x  Piriformis stretch 3x 20 secs  Quadraped thoracic rotation  Education provided:   - Educated pt on the importance of daily stretch to increase the benefit of program and positive results.     Written Home Exercises Provided: yes.  Exercises were reviewed and Chip was able to demonstrate them prior to the end of the session.  Chip demonstrated good  understanding of the education provided.     See EMR under Patient Instructions for exercises provided prior visit.          Assessment   Pt presented reports overall decrease in  LBP, but still feels some restriction with R rotation inthe T spine .(thoracic rotation in quadraped, thoracic extension in sitting)  Issued several exercises for pt to perform at home to increase mobility.  Inc ROM on Med X to 0-42 and kept weight at 88ft/lbs with pt completing 20reps..  Inc 5-10% based upon DOMS next visit.     Patient is making good progress towards established goals.  Pt will continue to benefit from skilled outpatient physical therapy to address the deficits stated in the impairment chart, provide pt/family education and to maximize pt's level of independence in the home and community environment.     Anticipated Barriers for therapy: none  Pt's spiritual, cultural and educational needs considered and pt agreeable to plan of care and goals as stated below:           GOALS: Pt is in agreement with the following goals.     Short term goals:  6 weeks or 10 visits   1.  Pt will demonstrate increased lumbar ROM by at least 3 degrees from the initial ROM value with improvements noted in functional ROM and ability to perform ADLs.  2.  Pt will demonstrate increased maximum isometric torque value by 10% when compared to the initial value resulting in improved ability to perform bending, lifting, and carrying activities safely, confidently.     3.  Patient report a reduction in worst pain score by 1-2 points for improved tolerance for .standing activities.  4.  Pt able to perform HEP correctly with minimal cueing or supervision from therapist to encourage independent management of symptoms.         Long term goals: 13 weeks or 20 visits   1. Pt will demonstrate increased lumbar ROM by at least 6 degrees from initial ROM value, resulting in improved ability to perform functional fwd bending while standing and sitting.   2. Pt will demonstrate increased maximum isometric torque value by 30% when compared to the initial value resulting in improved ability to perform bending, lifting, and carrying  activities safely, confidently.  3. Pt to demonstrate ability to independently control and reduce their pain through posture positioning and mechanical movements throughout a typical day.  4.  Pt will demonstrate reduced pain and improved functional outcomes as reported on the FOTO by reaching a score of CK = at least 40% but < 60% impaired, limited or restricted or less in order to demonstrate subjective improvement in pt's condition.    5. Pt will demonstrate independence with the HEP at discharge  6.  Pt(patient goal)will be able to return to hiking and sports with min LBP          Plan   Continue with established Plan of Care towards established PT goals.

## 2019-09-05 ENCOUNTER — CLINICAL SUPPORT (OUTPATIENT)
Dept: REHABILITATION | Facility: OTHER | Age: 39
End: 2019-09-05
Attending: PHYSICAL MEDICINE & REHABILITATION
Payer: COMMERCIAL

## 2019-09-05 DIAGNOSIS — G89.29 CHRONIC RIGHT-SIDED LOW BACK PAIN WITH RIGHT-SIDED SCIATICA: ICD-10-CM

## 2019-09-05 DIAGNOSIS — M62.81 WEAKNESS OF TRUNK MUSCULATURE: ICD-10-CM

## 2019-09-05 DIAGNOSIS — M54.41 CHRONIC RIGHT-SIDED LOW BACK PAIN WITH RIGHT-SIDED SCIATICA: ICD-10-CM

## 2019-09-05 PROCEDURE — 97110 THERAPEUTIC EXERCISES: CPT

## 2019-09-05 NOTE — PROGRESS NOTES
"[facility]      Name: Chip Arora  Clinic Number: [id number]    Therapy Diagnosis:   Encounter Diagnoses   Name Primary?    Weakness of trunk musculature     Chronic right-sided low back pain with right-sided sciatica      Physician: Miriam Hughes, *    Visit Date: 2019    Evaluation Date: 2019  Authorization Period Expiration: 19  Plan of Care Expiration: 19  Reassessment Due: 19  Visit # / Visits authorized:       Time In: 1000am  Time Out: 1100am  Total Billable Time: 40 minutes     Precautions: Standard and Diabetes, HTN, anxiety,depression     Pattern of pain determined: 1          PEP       Subjective   Chip reports he feels improvement in his LB and is having less pain overall. Pt stiffness today, mostly above his LB.     Patient reports tolerating previous visit minimal DOMS.  Patient reports their pain to be 3/10 on a 0-10 scale with 0 being no pain and 10 being the worst pain imaginable.  Pain Location: LB     Leisure: sports,exercise,kickball, bike ,hiking  Prior Level of Function: I c/ ADL's  Pts goals: go back to exercises more, not has issues with basic function"     Objective     Baseline Isometric Testing on Med X equipment: Testing administered by PT  Date of testin19  ROM 0-42   Max Peak Torque 225    Min Peak Torque 187    Flex/Ext Ratio 1.2:1   % below normative data 7below         Treatment    Pt was instructed in and performed the following:     Chip received therapeutic exercises to develop/improved posture, cardiovascular endurance, muscular endurance, lumbar/cervical ROM, strength and muscular endurance for 60 minutes including the following exercises:         [facility] 2019   Visit Number 8   VAS Pain Rating 3   Recumbent Bike Seat Pos. -   Time 10   Lumbar Weight 92   Repetitions 20   Rating of Perceived Exertion 4   Ice - Z Lie (in min.) 10       Exercises completd this visit:  EOE 10x  LTR " 10x  Open Book 10x  Thoraic extension over foam  Quad thoracic rotation 10x    Exercises completed on previous visits/HEP:    Seated Rotation 10x  Scapular retracions 10x  EIS with towel      Manual: Vacuum/cupping STM with manual therapy techniques was performed to mid back/L LB to decrease muscle tightness, increase circulation and promote healing process. The pt's skin was monitored for redness adjusting pressure as needed. The pt was instructed in possible side effects of bruising and/or soreness. 0 min        Peripheral muscle strengthening which included 1 set of 15-20 repetitions at a slow, controlled 10-13 second per rep pace focused on strengthening supporting musculature for improved body mechanics and functional mobility.  Pt and therapist focused on proper form during treatment to ensure optimal strengthening of each targeted muscle group.  Machines were utilized including torso rotation, leg extension, leg curl, chest press, upright row. Tricep extension, bicep curl, leg press, and hip abduction added visit 3    Chip received the following manual therapy techniques: None    Home Exercises Provided and Patient Education Provided   EIL 10x  EIS 10x with towel  LTR 10x  Scapular retracions 10x  Open Book 10x  Piriformis stretch 3x 20 secs  Quadraped thoracic rotation    Education provided:   - Educated pt on the importance of daily stretch to increase the benefit of program and positive results.     Written Home Exercises Provided: yes.  Exercises were reviewed and Chip was able to demonstrate them prior to the end of the session.  Chip demonstrated good  understanding of the education provided.     See EMR under Patient Instructions for exercises provided prior visit.          Assessment   Pt presented reports overall decrease in LBP, but still feels some restriction with R rotation inthe T spine .(thoracic rotation in quadraped, thoracic extension in sitting.  Pt with a weight increase and 20 reps.  88ft/lbs with pt completing 20reps.     Patient is making good progress towards established goals.  Pt will continue to benefit from skilled outpatient physical therapy to address the deficits stated in the impairment chart, provide pt/family education and to maximize pt's level of independence in the home and community environment.     Anticipated Barriers for therapy: none  Pt's spiritual, cultural and educational needs considered and pt agreeable to plan of care and goals as stated below:           GOALS: Pt is in agreement with the following goals.     Short term goals:  6 weeks or 10 visits   1.  Pt will demonstrate increased lumbar ROM by at least 3 degrees from the initial ROM value with improvements noted in functional ROM and ability to perform ADLs.  2.  Pt will demonstrate increased maximum isometric torque value by 10% when compared to the initial value resulting in improved ability to perform bending, lifting, and carrying activities safely, confidently.     3.  Patient report a reduction in worst pain score by 1-2 points for improved tolerance for .standing activities.  4.  Pt able to perform HEP correctly with minimal cueing or supervision from therapist to encourage independent management of symptoms.         Long term goals: 13 weeks or 20 visits   1. Pt will demonstrate increased lumbar ROM by at least 6 degrees from initial ROM value, resulting in improved ability to perform functional fwd bending while standing and sitting.   2. Pt will demonstrate increased maximum isometric torque value by 30% when compared to the initial value resulting in improved ability to perform bending, lifting, and carrying activities safely, confidently.  3. Pt to demonstrate ability to independently control and reduce their pain through posture positioning and mechanical movements throughout a typical day.  4.  Pt will demonstrate reduced pain and improved functional outcomes as reported on the FOTO by reaching a  score of CK = at least 40% but < 60% impaired, limited or restricted or less in order to demonstrate subjective improvement in pt's condition.    5. Pt will demonstrate independence with the HEP at discharge  6.  Pt(patient goal)will be able to return to hiking and sports with min LBP          Plan   Continue with established Plan of Care towards established PT goals.

## 2019-09-10 ENCOUNTER — CLINICAL SUPPORT (OUTPATIENT)
Dept: REHABILITATION | Facility: OTHER | Age: 39
End: 2019-09-10
Attending: PHYSICAL MEDICINE & REHABILITATION
Payer: COMMERCIAL

## 2019-09-10 DIAGNOSIS — G89.29 CHRONIC RIGHT-SIDED LOW BACK PAIN WITH RIGHT-SIDED SCIATICA: ICD-10-CM

## 2019-09-10 DIAGNOSIS — M54.41 CHRONIC RIGHT-SIDED LOW BACK PAIN WITH RIGHT-SIDED SCIATICA: ICD-10-CM

## 2019-09-10 DIAGNOSIS — M62.81 WEAKNESS OF TRUNK MUSCULATURE: ICD-10-CM

## 2019-09-10 PROCEDURE — 97110 THERAPEUTIC EXERCISES: CPT

## 2019-09-10 NOTE — PROGRESS NOTES
"[facility]      Name: Chip Arora  Clinic Number: [id number]    Therapy Diagnosis:   Encounter Diagnoses   Name Primary?    Weakness of trunk musculature     Chronic right-sided low back pain with right-sided sciatica      Physician: Miriam Hughes, *    Visit Date: 9/10/2019    Evaluation Date: 2019  Authorization Period Expiration: 19  Plan of Care Expiration: 19  Reassessment Due: 19  Visit # / Visits authorized:       Time In: 2:37pm  Time Out: 3:30pm  Total Billable Time: 30 minutes     Precautions: Standard and Diabetes, HTN, anxiety,depression     Pattern of pain determined: 1          PEP       Subjective   Chip reports he feels improvement in his LB and mid back today and is having less pain overall, but this week he feels a little more restricted.     Patient reports tolerating previous visit minimal DOMS.  Patient reports their pain to be 3/10 on a 0-10 scale with 0 being no pain and 10 being the worst pain imaginable.  Pain Location: LB     Leisure: sports,exercise,kickball, bike ,hiking  Prior Level of Function: I c/ ADL's  Pts goals: go back to exercises more, not has issues with basic function"     Objective     Baseline Isometric Testing on Med X equipment: Testing administered by PT  Date of testin19  ROM 0-42   Max Peak Torque 225    Min Peak Torque 187    Flex/Ext Ratio 1.2:1   % below normative data 7below         Treatment    Pt was instructed in and performed the following:     Chip received therapeutic exercises to develop/improved posture, cardiovascular endurance, muscular endurance, lumbar/cervical ROM, strength and muscular endurance for 50 minutes including the following exercises:           [facility] 9/10/2019   Visit Number 9   VAS Pain Rating 3   Recumbent Bike Seat Pos. -   Time 10   Extension in Lying -   Manual Therapy 5   Lumbar Weight 97   Repetitions 20   Rating of Perceived Exertion 4   Ice " - Z Lie (in min.) 10       Exercises completd this visit:  EOE 10x  LTR 10x  Open Book 10x  Thoraic extension over foam  Quad thoracic rotation 10x    Exercises completed on previous visits/HEP:    Seated Rotation 10x  Scapular retracions 10x  EIS with towel      Manual: Vacuum/cupping STM with manual therapy techniques was performed to mid back to decrease muscle tightness, increase circulation and promote healing process. The pt's skin was monitored for redness adjusting pressure as needed. The pt was instructed in possible side effects of bruising and/or soreness. 5 min        Peripheral muscle strengthening which included 1 set of 15-20 repetitions at a slow, controlled 10-13 second per rep pace focused on strengthening supporting musculature for improved body mechanics and functional mobility.  Pt and therapist focused on proper form during treatment to ensure optimal strengthening of each targeted muscle group.  Machines were utilized including torso rotation, leg extension, leg curl, chest press, upright row. Tricep extension, bicep curl, leg press, and hip abduction added visit 3    Chip received the following manual therapy techniques: None    Home Exercises Provided and Patient Education Provided   EIL 10x  EIS 10x with towel  LTR 10x  Scapular retracions 10x  Open Book 10x  Piriformis stretch 3x 20 secs  Quadraped thoracic rotation    Education provided:   - Educated pt on the importance of daily stretch to increase the benefit of program and positive results.     Written Home Exercises Provided: yes.  Exercises were reviewed and Chip was able to demonstrate them prior to the end of the session.  Chip demonstrated good  understanding of the education provided.     See EMR under Patient Instructions for exercises provided prior visit.          Assessment   Pt presented reports overall decrease in LBP/mid back pain/restriction with session with cupping and then his HEP. Issued him a cupping info sheet to  order due to cupping has helped decrease his retraction. Pt with a weight increase and 20 reps. Pt benefits with from cupping and may purchase for home.     Patient is making good progress towards established goals.  Pt will continue to benefit from skilled outpatient physical therapy to address the deficits stated in the impairment chart, provide pt/family education and to maximize pt's level of independence in the home and community environment.     Anticipated Barriers for therapy: none  Pt's spiritual, cultural and educational needs considered and pt agreeable to plan of care and goals as stated below:           GOALS: Pt is in agreement with the following goals.     Short term goals:  6 weeks or 10 visits   1.  Pt will demonstrate increased lumbar ROM by at least 3 degrees from the initial ROM value with improvements noted in functional ROM and ability to perform ADLs.  2.  Pt will demonstrate increased maximum isometric torque value by 10% when compared to the initial value resulting in improved ability to perform bending, lifting, and carrying activities safely, confidently.     3.  Patient report a reduction in worst pain score by 1-2 points for improved tolerance for .standing activities.  4.  Pt able to perform HEP correctly with minimal cueing or supervision from therapist to encourage independent management of symptoms.         Long term goals: 13 weeks or 20 visits   1. Pt will demonstrate increased lumbar ROM by at least 6 degrees from initial ROM value, resulting in improved ability to perform functional fwd bending while standing and sitting.   2. Pt will demonstrate increased maximum isometric torque value by 30% when compared to the initial value resulting in improved ability to perform bending, lifting, and carrying activities safely, confidently.  3. Pt to demonstrate ability to independently control and reduce their pain through posture positioning and mechanical movements throughout a typical  day.  4.  Pt will demonstrate reduced pain and improved functional outcomes as reported on the FOTO by reaching a score of CK = at least 40% but < 60% impaired, limited or restricted or less in order to demonstrate subjective improvement in pt's condition.    5. Pt will demonstrate independence with the HEP at discharge  6.  Pt(patient goal)will be able to return to hiking and sports with min LBP          Plan   Continue with established Plan of Care towards established PT goals.

## 2019-09-11 ENCOUNTER — PATIENT OUTREACH (OUTPATIENT)
Dept: ADMINISTRATIVE | Facility: HOSPITAL | Age: 39
End: 2019-09-11

## 2019-09-11 NOTE — LETTER
AUTHORIZATION FOR RELEASE OF   CONFIDENTIAL INFORMATION    Dear [facility],    We are seeing Chip Arora, date of birth 1980, in the clinic at [facility]. Yessenia Albert DO is the patient's PCP. Chip Arora has an outstanding lab/procedure at the time we reviewed his chart. In order to help keep his health information updated, he has authorized us to request the following medical record(s):        (  )  MAMMOGRAM                                      (  )  COLONOSCOPY      (  )  PAP SMEAR                                          (  )  OUTSIDE LAB RESULTS     (  )  DEXA SCAN                                          (X)  EYE EXAM            (  )  FOOT EXAM                                          (  )  ENTIRE RECORD     (  )  OUTSIDE IMMUNIZATIONS                 (  )  _______________         Please fax records to [facility], Angele S Lafleur, DO, [phone]     If you have any questions, please contact CHASE Wallace at [phone]          Patient Name: Chip Arora  : 1980  Patient Phone #: [phone]

## 2019-09-13 ENCOUNTER — PATIENT OUTREACH (OUTPATIENT)
Dept: ADMINISTRATIVE | Facility: HOSPITAL | Age: 39
End: 2019-09-13

## 2019-09-16 ENCOUNTER — CLINICAL SUPPORT (OUTPATIENT)
Dept: REHABILITATION | Facility: OTHER | Age: 39
End: 2019-09-16
Attending: PHYSICAL MEDICINE & REHABILITATION
Payer: COMMERCIAL

## 2019-09-16 DIAGNOSIS — M62.81 WEAKNESS OF TRUNK MUSCULATURE: ICD-10-CM

## 2019-09-16 DIAGNOSIS — G89.29 CHRONIC RIGHT-SIDED LOW BACK PAIN WITH RIGHT-SIDED SCIATICA: ICD-10-CM

## 2019-09-16 DIAGNOSIS — M54.41 CHRONIC RIGHT-SIDED LOW BACK PAIN WITH RIGHT-SIDED SCIATICA: ICD-10-CM

## 2019-09-16 PROCEDURE — 97110 THERAPEUTIC EXERCISES: CPT | Performed by: PHYSICAL THERAPIST

## 2019-09-16 NOTE — PROGRESS NOTES
"[facility]      Name: Chip Arora  Clinic Number: [id number]    Therapy Diagnosis:   Encounter Diagnoses   Name Primary?    Weakness of trunk musculature     Chronic right-sided low back pain with right-sided sciatica      Physician: Miriam Hughes, *    Visit Date: 2019    Evaluation Date: 2019  Authorization Period Expiration: 19  Plan of Care Expiration: 19  Reassessment Due: 19  Visit # / Visits authorized: 10/ 20      Time In: 1425  Time Out: 1522  Total Billable Time: 57 minutes     Precautions: Standard and Diabetes, HTN, anxiety,depression     Pattern of pain determined: 1          PEP       Subjective   Chip reports  His back has been feeling looser and better over the past 4-5 days. Today he is barely feeling any discomfort     Patient reports tolerating previous visit minimal DOMS.  Patient reports their pain to be 0/10 on a 0-10 scale with 0 being no pain and 10 being the worst pain imaginable.  Pain Location: LB     Leisure: sports,exercise,kickball, bike ,hiking  Prior Level of Function: I c/ ADL's  Pts goals: go back to exercises more, not has issues with basic function"     Objective     Baseline Isometric Testing on Med X equipment: Testing administered by PT  Date of testin19  ROM 0-42   Max Peak Torque 225    Min Peak Torque 187    Flex/Ext Ratio 1.2:1   % below normative data 7below     Midpoint Isometric Testing on Med X equipment: Testing administered by PT  Date of testin19  ROM 0-48   Max Peak Torque 322    Min Peak Torque 271   Flex/Ext Ratio 1.18:1   % Above normative data 38%         Treatment    Pt was instructed in and performed the following:     Chip received therapeutic exercises to develop/improved posture, cardiovascular endurance, muscular endurance, lumbar/cervical ROM, strength and muscular endurance for 57 minutes including the following exercises:     Medx lumbar extension: 50ft# x " "95"    [facility] 9/16/2019   Visit Number 10   VAS Pain Rating 0   Recumbent Bike Seat Pos. -   Time 8   Lumbar Flexion 48   Lumbar Extension 0   Lumbar Peak Torque 322   Min Torque 271   Test Percent Below Normative Data -   Test Percent Gain in Strength from Initial  42   Ice - Z Lie (in min.) 0           Exercises completd this visit:  EOE 10x  LTR 10x  Open Book 10x  Quad thoracic rotation 10x    Exercises completed on previous visits/HEP:    Seated Rotation 10x  Scapular retracions 10x  EIS with towel        Peripheral muscle strengthening which included 1 set of 15-20 repetitions at a slow, controlled 10-13 second per rep pace focused on strengthening supporting musculature for improved body mechanics and functional mobility.  Pt and therapist focused on proper form during treatment to ensure optimal strengthening of each targeted muscle group.  Machines were utilized including torso rotation, leg extension, leg curl, chest press, upright row. Tricep extension, bicep curl, leg press, and hip abduction added visit 3    Chip received the following manual therapy techniques: None    Home Exercises Provided and Patient Education Provided   EIL 10x  EIS 10x with towel  LTR 10x  Scapular retracions 10x  Open Book 10x  Piriformis stretch 3x 20 secs  Quadraped thoracic rotation    Education provided:   - Educated pt on the importance of daily stretch to increase the benefit of program and positive results.     Written Home Exercises Provided: yes.  Exercises were reviewed and Chip was able to demonstrate them prior to the end of the session.  Chip demonstrated good  understanding of the education provided.     See EMR under Patient Instructions for exercises provided prior visit.          Assessment       Patient has attended 10 visits at [facility] which included MD evaluation, PT evaluation with isometric testing, and physical therapy treatment including HEP instruction, education, aerobic " work, dynamic strengthening on med ex equipment for the spine, and whole body strengthening on med ex equipment with increasing weight loads.  Patient  is demonstrating increased ability to reduce symptoms, improved posture, improved   ROM by 6 degrees, and improved   strength on med ex test by  42% since his evaluation. He is now testing at 38% above age matched norms.. He has met all of his short term goals for therapy.       Patient is making good progress towards established goals.  Pt will continue to benefit from skilled outpatient physical therapy to address the deficits stated in the impairment chart, provide pt/family education and to maximize pt's level of independence in the home and community environment.     Anticipated Barriers for therapy: none  Pt's spiritual, cultural and educational needs considered and pt agreeable to plan of care and goals as stated below:           GOALS: Pt is in agreement with the following goals.     Short term goals:  6 weeks or 10 visits   1.  Pt will demonstrate increased lumbar ROM by at least 3 degrees from the initial ROM value with improvements noted in functional ROM and ability to perform ADLs. Met 9/16/19  2.  Pt will demonstrate increased maximum isometric torque value by 10% when compared to the initial value resulting in improved ability to perform bending, lifting, and carrying activities safely, confidently. Met 9/16/19     3.  Patient report a reduction in worst pain score by 1-2 points for improved tolerance for .standing activities. Met 9/16/19  4.  Pt able to perform HEP correctly with minimal cueing or supervision from therapist to encourage independent management of symptoms.  Met 9/16/19        Long term goals: 13 weeks or 20 visits   1. Pt will demonstrate increased lumbar ROM by at least 6 degrees from initial ROM value, resulting in improved ability to perform functional fwd bending while standing and sitting.  Met 9/16/19  2. Pt will demonstrate  increased maximum isometric torque value by 30% when compared to the initial value resulting in improved ability to perform bending, lifting, and carrying activities safely, confidently.   3. Pt to demonstrate ability to independently control and reduce their pain through posture positioning and mechanical movements throughout a typical day.  4.  Pt will demonstrate reduced pain and improved functional outcomes as reported on the FOTO by reaching a score of CK = at least 40% but < 60% impaired, limited or restricted or less in order to demonstrate subjective improvement in pt's condition.    5. Pt will demonstrate independence with the HEP at discharge  6.  Pt(patient goal)will be able to return to hiking and sports with min LBP          Plan   Continue with established Plan of Care towards established PT goals.

## 2019-09-20 ENCOUNTER — CLINICAL SUPPORT (OUTPATIENT)
Dept: REHABILITATION | Facility: OTHER | Age: 39
End: 2019-09-20
Attending: PHYSICAL MEDICINE & REHABILITATION
Payer: COMMERCIAL

## 2019-09-20 DIAGNOSIS — M62.81 WEAKNESS OF TRUNK MUSCULATURE: ICD-10-CM

## 2019-09-20 DIAGNOSIS — M54.41 CHRONIC RIGHT-SIDED LOW BACK PAIN WITH RIGHT-SIDED SCIATICA: ICD-10-CM

## 2019-09-20 DIAGNOSIS — G89.29 CHRONIC RIGHT-SIDED LOW BACK PAIN WITH RIGHT-SIDED SCIATICA: ICD-10-CM

## 2019-09-20 PROCEDURE — 97110 THERAPEUTIC EXERCISES: CPT

## 2019-09-20 NOTE — PROGRESS NOTES
"[facility]      Name: Chip Arora  Clinic Number: [id number]    Therapy Diagnosis:   No diagnosis found.  Physician: Miriam Hughes, *    Visit Date: 2019    Evaluation Date: 2019  Authorization Period Expiration: 19  Plan of Care Expiration: 19  Reassessment Due: 19  Visit # / Visits authorized:       Time In: 300  Time Out: 400  Total Billable Time: 40 minutes     Precautions: Standard and Diabetes, HTN, anxiety,depression     Pattern of pain determined: 1          PEP       Subjective   Chip reports his back pain is increased today and has been for a couple of days since previous session.     Patient reports tolerating previous visit minimal DOMS.  Patient reports their pain to be 4/10 on a 0-10 scale with 0 being no pain and 10 being the worst pain imaginable.  Pain Location: LB     Leisure: sports,exercise,kickball, bike ,hiking  Prior Level of Function: I c/ ADL's  Pts goals: go back to exercises more, not has issues with basic function"     Objective     Baseline Isometric Testing on Med X equipment: Testing administered by PT  Date of testin19  ROM 0-42   Max Peak Torque 225    Min Peak Torque 187    Flex/Ext Ratio 1.2:1   % below normative data 7below     Midpoint Isometric Testing on Med X equipment: Testing administered by PT  Date of testin19  ROM 0-48   Max Peak Torque 322    Min Peak Torque 271   Flex/Ext Ratio 1.18:1   % Above normative data 38%         Treatment    Pt was instructed in and performed the following:     Chip received therapeutic exercises to develop/improved posture, cardiovascular endurance, muscular endurance, lumbar/cervical ROM, strength and muscular endurance for 57 minutes including the following exercises:   Medx lumbar extension: 50ft# x 95"  [facility] 2019   Visit Number 11   VAS Pain Rating 4   Recumbent Bike Seat Pos. -   Time 10   Extension in Lying -   Manual Therapy - "   Femur Restraint -   Top Dead Center -   Counterweight -   Lumbar Flexion -   Lumbar Extension -   Lumbar Peak Torque -   Min Torque -   Test Percent Below Normative Data -   Test Percent Gain in Strength from Initial  -   Lumbar Weight 102   Repetitions 15   Rating of Perceived Exertion 4   Ice - Z Lie (in min.) 10     Exercises completd this visit:  EOE 10x  LTR 10x  Open Book 10x  Quad thoracic rotation 10x    Exercises completed on previous visits/HEP:    Seated Rotation 10x  Scapular retracions 10x  EIS with towel        Peripheral muscle strengthening which included 1 set of 15-20 repetitions at a slow, controlled 10-13 second per rep pace focused on strengthening supporting musculature for improved body mechanics and functional mobility.  Pt and therapist focused on proper form during treatment to ensure optimal strengthening of each targeted muscle group.  Machines were utilized including torso rotation, leg extension, leg curl, chest press, upright row. Tricep extension, bicep curl, leg press, and hip abduction added visit 3    Chip received the following manual therapy techniques: None    Home Exercises Provided and Patient Education Provided   EIL 10x  EIS 10x with towel  LTR 10x  Scapular retracions 10x  Open Book 10x  Piriformis stretch 3x 20 secs  Quadraped thoracic rotation    Education provided:   - Educated pt on the importance of daily stretch to increase the benefit of program and positive results.     Written Home Exercises Provided: yes.  Exercises were reviewed and Chip was able to demonstrate them prior to the end of the session.  Chip demonstrated good  understanding of the education provided.     See EMR under Patient Instructions for exercises provided prior visit.    Assessment     Pt able to complete all components of session with no adverse effects. Pt responded to manual intervention with positive symptom relief. Increased resistance 5%. Able to achieve 15 repetitions with  appropriate RPE.  Patient is making good progress towards established goals.  Pt will continue to benefit from skilled outpatient physical therapy to address the deficits stated in the impairment chart, provide pt/family education and to maximize pt's level of independence in the home and community environment.     Anticipated Barriers for therapy: none  Pt's spiritual, cultural and educational needs considered and pt agreeable to plan of care and goals as stated below:           GOALS: Pt is in agreement with the following goals.     Short term goals:  6 weeks or 10 visits   1.  Pt will demonstrate increased lumbar ROM by at least 3 degrees from the initial ROM value with improvements noted in functional ROM and ability to perform ADLs. Met 9/16/19  2.  Pt will demonstrate increased maximum isometric torque value by 10% when compared to the initial value resulting in improved ability to perform bending, lifting, and carrying activities safely, confidently. Met 9/16/19     3.  Patient report a reduction in worst pain score by 1-2 points for improved tolerance for .standing activities. Met 9/16/19  4.  Pt able to perform HEP correctly with minimal cueing or supervision from therapist to encourage independent management of symptoms.  Met 9/16/19        Long term goals: 13 weeks or 20 visits   1. Pt will demonstrate increased lumbar ROM by at least 6 degrees from initial ROM value, resulting in improved ability to perform functional fwd bending while standing and sitting.  Met 9/16/19  2. Pt will demonstrate increased maximum isometric torque value by 30% when compared to the initial value resulting in improved ability to perform bending, lifting, and carrying activities safely, confidently.   3. Pt to demonstrate ability to independently control and reduce their pain through posture positioning and mechanical movements throughout a typical day.  4.  Pt will demonstrate reduced pain and improved functional outcomes as  reported on the FOTO by reaching a score of CK = at least 40% but < 60% impaired, limited or restricted or less in order to demonstrate subjective improvement in pt's condition.    5. Pt will demonstrate independence with the HEP at discharge  6.  Pt(patient goal)will be able to return to hiking and sports with min LBP          Plan   Continue with established Plan of Care towards established PT goals.

## 2019-09-24 ENCOUNTER — CLINICAL SUPPORT (OUTPATIENT)
Dept: REHABILITATION | Facility: OTHER | Age: 39
End: 2019-09-24
Attending: PHYSICAL MEDICINE & REHABILITATION
Payer: COMMERCIAL

## 2019-09-24 DIAGNOSIS — M62.81 WEAKNESS OF TRUNK MUSCULATURE: ICD-10-CM

## 2019-09-24 DIAGNOSIS — M54.41 CHRONIC RIGHT-SIDED LOW BACK PAIN WITH RIGHT-SIDED SCIATICA: ICD-10-CM

## 2019-09-24 DIAGNOSIS — G89.29 CHRONIC RIGHT-SIDED LOW BACK PAIN WITH RIGHT-SIDED SCIATICA: ICD-10-CM

## 2019-09-24 PROCEDURE — 97110 THERAPEUTIC EXERCISES: CPT

## 2019-09-24 RX ORDER — CYCLOBENZAPRINE HCL 10 MG
TABLET ORAL
Qty: 30 TABLET | Refills: 6 | Status: SHIPPED | OUTPATIENT
Start: 2019-09-24

## 2019-09-24 NOTE — PROGRESS NOTES
"[facility]      Name: Chip Arora  Clinic Number: [id number]    Therapy Diagnosis:   Encounter Diagnoses   Name Primary?    Weakness of trunk musculature     Chronic right-sided low back pain with right-sided sciatica      Physician: Miriam Hughes, *    Visit Date: 2019    Evaluation Date: 2019  Authorization Period Expiration: 19  Plan of Care Expiration: 19  Reassessment Due: 19  Visit # / Visits authorized:       Time In: 10:00  Time Out: 11:00  Total Billable Time: 40 minutes     Precautions: Standard and Diabetes, HTN, anxiety,depression     Pattern of pain determined: 1          PEP       Subjective   Chip reports less Back pain in general, but he goes feel restricted in his midback where he was injured.     Patient reports tolerating previous visit minimal DOMS.  Patient reports their pain to be 2/10 on a 0-10 scale with 0 being no pain and 10 being the worst pain imaginable.  Pain Location: LB     Leisure: sports,exercise,kickball, bike ,hiking  Prior Level of Function: I c/ ADL's  Pts goals: go back to exercises more, not has issues with basic function"     Objective     Baseline Isometric Testing on Med X equipment: Testing administered by PT  Date of testin19  ROM 0-42   Max Peak Torque 225    Min Peak Torque 187    Flex/Ext Ratio 1.2:1   % below normative data 7below     Midpoint Isometric Testing on Med X equipment: Testing administered by PT  Date of testin19  ROM 0-48   Max Peak Torque 322    Min Peak Torque 271   Flex/Ext Ratio 1.18:1   % Above normative data 38%         Treatment    Pt was instructed in and performed the following:     Chip received therapeutic exercises to develop/improved posture, cardiovascular endurance, muscular endurance, lumbar/cervical ROM, strength and muscular endurance for 57 minutes including the following exercises:     [facility] 2019   Visit Number 12   VAS Pain " Rating 2   Recumbent Bike Seat Pos. -   Time 10   Lumbar Weight 102   Repetitions 18   Rating of Perceived Exertion 5   Ice - Z Lie (in min.) 10       Exercises completd this visit:  EOE 10x  LTR 10x  Open Book 10x  Quad thoracic rotation 10x    Exercises completed on previous visits/HEP:    Seated Rotation 10x  Scapular retracions 10x  EIS with towel        Peripheral muscle strengthening which included 1 set of 15-20 repetitions at a slow, controlled 10-13 second per rep pace focused on strengthening supporting musculature for improved body mechanics and functional mobility.  Pt and therapist focused on proper form during treatment to ensure optimal strengthening of each targeted muscle group.  Machines were utilized including torso rotation, leg extension, leg curl, chest press, upright row. Tricep extension, bicep curl, leg press, and hip abduction added visit 3    Chip received the following manual therapy techniques: None    Home Exercises Provided and Patient Education Provided   EIL 10x  EIS 10x with towel  LTR 10x  Scapular retracions 10x  Open Book 10x  Piriformis stretch 3x 20 secs  Quadraped thoracic rotation    Education provided:   - Educated pt on the importance of daily stretch to increase the benefit of program and positive results.     Written Home Exercises Provided: yes.  Exercises were reviewed and Chip was able to demonstrate them prior to the end of the session.  Chip demonstrated good  understanding of the education provided.     See EMR under Patient Instructions for exercises provided prior visit.    Assessment     Pt able to complete session with less pain.  Able to achieve 18 repetitions with the same weight with appropriate RPE. Pt experiencing less back pain, but still restricted in midback.   Patient is making good progress towards established goals.  Pt will continue to benefit from skilled outpatient physical therapy to address the deficits stated in the impairment chart, provide  pt/family education and to maximize pt's level of independence in the home and community environment.     Anticipated Barriers for therapy: none  Pt's spiritual, cultural and educational needs considered and pt agreeable to plan of care and goals as stated below:           GOALS: Pt is in agreement with the following goals.     Short term goals:  6 weeks or 10 visits   1.  Pt will demonstrate increased lumbar ROM by at least 3 degrees from the initial ROM value with improvements noted in functional ROM and ability to perform ADLs. Met 9/16/19  2.  Pt will demonstrate increased maximum isometric torque value by 10% when compared to the initial value resulting in improved ability to perform bending, lifting, and carrying activities safely, confidently. Met 9/16/19     3.  Patient report a reduction in worst pain score by 1-2 points for improved tolerance for .standing activities. Met 9/16/19  4.  Pt able to perform HEP correctly with minimal cueing or supervision from therapist to encourage independent management of symptoms.  Met 9/16/19        Long term goals: 13 weeks or 20 visits   1. Pt will demonstrate increased lumbar ROM by at least 6 degrees from initial ROM value, resulting in improved ability to perform functional fwd bending while standing and sitting.  Met 9/16/19  2. Pt will demonstrate increased maximum isometric torque value by 30% when compared to the initial value resulting in improved ability to perform bending, lifting, and carrying activities safely, confidently.   3. Pt to demonstrate ability to independently control and reduce their pain through posture positioning and mechanical movements throughout a typical day.  4.  Pt will demonstrate reduced pain and improved functional outcomes as reported on the FOTO by reaching a score of CK = at least 40% but < 60% impaired, limited or restricted or less in order to demonstrate subjective improvement in pt's condition.    5. Pt will demonstrate  independence with the HEP at discharge  6.  Pt(patient goal)will be able to return to hiking and sports with min LBP          Plan   Continue with established Plan of Care towards established PT goals.

## 2019-10-01 ENCOUNTER — CLINICAL SUPPORT (OUTPATIENT)
Dept: REHABILITATION | Facility: OTHER | Age: 39
End: 2019-10-01
Attending: PHYSICAL MEDICINE & REHABILITATION
Payer: COMMERCIAL

## 2019-10-01 DIAGNOSIS — G89.29 CHRONIC RIGHT-SIDED LOW BACK PAIN WITH RIGHT-SIDED SCIATICA: ICD-10-CM

## 2019-10-01 DIAGNOSIS — M62.81 WEAKNESS OF TRUNK MUSCULATURE: ICD-10-CM

## 2019-10-01 DIAGNOSIS — M54.41 CHRONIC RIGHT-SIDED LOW BACK PAIN WITH RIGHT-SIDED SCIATICA: ICD-10-CM

## 2019-10-01 PROCEDURE — 97110 THERAPEUTIC EXERCISES: CPT

## 2019-10-01 NOTE — PROGRESS NOTES
"[facility]      Name: Chip Arora  Clinic Number: [id number]    Therapy Diagnosis:   Encounter Diagnoses   Name Primary?    Weakness of trunk musculature     Chronic right-sided low back pain with right-sided sciatica      Physician: Miriam Hughes, *    Visit Date: 10/1/2019    Evaluation Date: 2019  Authorization Period Expiration: 19  Plan of Care Expiration: 19  Reassessment Due:19  Visit # / Visits authorized:       Time In: 15:00  Time Out: 16:00  Total Billable Time: 40 minutes     Precautions: Standard and Diabetes, HTN, anxiety,depression     Pattern of pain determined: 1          PEP       Subjective   Chip reports the back is feeling good but he had some cramps in the calfs    Patient reports tolerating previous visit minimal DOMS.  Patient reports their pain to be -/10 on a 0-10 scale with 0 being no pain and 10 being the worst pain imaginable.  Pain Location: LB     Leisure: sports,exercise,kickball, bike ,hiking  Prior Level of Function: I c/ ADL's  Pts goals: go back to exercises more, not has issues with basic function"     Objective     Baseline Isometric Testing on Med X equipment: Testing administered by PT  Date of testin19  ROM 0-42   Max Peak Torque 225    Min Peak Torque 187    Flex/Ext Ratio 1.2:1   % below normative data 7below     Midpoint Isometric Testing on Med X equipment: Testing administered by PT  Date of testin19  ROM 0-48   Max Peak Torque 322    Min Peak Torque 271   Flex/Ext Ratio 1.18:1   % Above normative data 38%         Treatment    Pt was instructed in and performed the following:     Chip received therapeutic exercises to develop/improved posture, cardiovascular endurance, muscular endurance, lumbar/cervical ROM, strength and muscular endurance for 57 minutes including the following exercises:     [facility] 10/1/2019   Visit Number 13   VAS Pain Rating 0   Recumbent Bike Seat " Pos. -   Time 10   Extension in Lying 20   Manual Therapy 5   Lumbar Weight 102   Repetitions 19   Rating of Perceived Exertion 4   Ice - Z Lie (in min.) 10         Exercises completd this visit:  EOE 10x  LTR 10x  Open Book 10x  Quad thoracic rotation 10x    Exercises completed on previous visits/HEP:    Seated Rotation 10x  Scapular retracions 10x  EIS with towel        Peripheral muscle strengthening which included 1 set of 15-20 repetitions at a slow, controlled 10-13 second per rep pace focused on strengthening supporting musculature for improved body mechanics and functional mobility.  Pt and therapist focused on proper form during treatment to ensure optimal strengthening of each targeted muscle group.  Machines were utilized including torso rotation, leg extension, leg curl, chest press, upright row. Tricep extension, bicep curl, leg press, and hip abduction added visit 3    Chip received the following manual therapy techniques: PA glides at upper cervical lower thoracic region grade III, in prone and with extension    Home Exercises Provided and Patient Education Provided   EIL 10x  EIS 10x with towel  LTR 10x  Scapular retracions 10x  Open Book 10x  Piriformis stretch 3x 20 secs  Quadraped thoracic rotation    Education provided:   - Educated pt on the importance of daily stretch to increase the benefit of program and positive results.     Written Home Exercises Provided: yes.  Exercises were reviewed and Chip was able to demonstrate them prior to the end of the session.  Chip demonstrated good  understanding of the education provided.     See EMR under Patient Instructions for exercises provided prior visit.    Assessment     Pt able to complete exercises this visit without increased discomfort overall. Plan to decrease ROM on the lumbar MEDx (already decreased) due to pt reports that he feels like he had not been progressing since the midpoint visit.     Patient is making good progress towards  established goals.  Pt will continue to benefit from skilled outpatient physical therapy to address the deficits stated in the impairment chart, provide pt/family education and to maximize pt's level of independence in the home and community environment.     Anticipated Barriers for therapy: none  Pt's spiritual, cultural and educational needs considered and pt agreeable to plan of care and goals as stated below:           GOALS: Pt is in agreement with the following goals.     Short term goals:  6 weeks or 10 visits   1.  Pt will demonstrate increased lumbar ROM by at least 3 degrees from the initial ROM value with improvements noted in functional ROM and ability to perform ADLs. Met 9/16/19  2.  Pt will demonstrate increased maximum isometric torque value by 10% when compared to the initial value resulting in improved ability to perform bending, lifting, and carrying activities safely, confidently. Met 9/16/19     3.  Patient report a reduction in worst pain score by 1-2 points for improved tolerance for .standing activities. Met 9/16/19  4.  Pt able to perform HEP correctly with minimal cueing or supervision from therapist to encourage independent management of symptoms.  Met 9/16/19        Long term goals: 13 weeks or 20 visits   1. Pt will demonstrate increased lumbar ROM by at least 6 degrees from initial ROM value, resulting in improved ability to perform functional fwd bending while standing and sitting.  Met 9/16/19  2. Pt will demonstrate increased maximum isometric torque value by 30% when compared to the initial value resulting in improved ability to perform bending, lifting, and carrying activities safely, confidently.   3. Pt to demonstrate ability to independently control and reduce their pain through posture positioning and mechanical movements throughout a typical day.  4.  Pt will demonstrate reduced pain and improved functional outcomes as reported on the FOTO by reaching a score of CK = at least  40% but < 60% impaired, limited or restricted or less in order to demonstrate subjective improvement in pt's condition.    5. Pt will demonstrate independence with the HEP at discharge  6.  Pt(patient goal)will be able to return to hiking and sports with min LBP          Plan   Continue with established Plan of Care towards established PT goals.

## 2019-10-03 ENCOUNTER — CLINICAL SUPPORT (OUTPATIENT)
Dept: REHABILITATION | Facility: OTHER | Age: 39
End: 2019-10-03
Attending: PHYSICAL MEDICINE & REHABILITATION
Payer: COMMERCIAL

## 2019-10-03 DIAGNOSIS — M62.81 WEAKNESS OF TRUNK MUSCULATURE: ICD-10-CM

## 2019-10-03 DIAGNOSIS — G89.29 CHRONIC RIGHT-SIDED LOW BACK PAIN WITH RIGHT-SIDED SCIATICA: ICD-10-CM

## 2019-10-03 DIAGNOSIS — M54.41 CHRONIC RIGHT-SIDED LOW BACK PAIN WITH RIGHT-SIDED SCIATICA: ICD-10-CM

## 2019-10-03 PROCEDURE — 97110 THERAPEUTIC EXERCISES: CPT

## 2019-10-03 NOTE — PROGRESS NOTES
"[facility] Therapy Treatment      Name: Chip Arora  Clinic Number: [id number]    Therapy Diagnosis:   No diagnosis found.  Physician: Miriam Hughes, *    Visit Date: 10/3/2019    Evaluation Date: 2019  Authorization Period Expiration: 19  Plan of Care Expiration: 19  Reassessment Due:19  Visit # / Visits authorized: (  Check ROM again)     Time In: 300pm  Time Out: 400pm  Total Billable Time: 40 minutes     Precautions: Standard and Diabetes, HTN, anxiety,depression     Pattern of pain determined: 1          PEP       Subjective   Chip reports he can still have intense flare ups.  Pt reports the ice locks him up after the work outs.  Pt reports he felt a big improvement initially, but is frustrated with progress in last several weeks      Patient reports tolerating previous visit minimal DOMS.  Patient reports their pain to be3 /10 on a 0-10 scale with 0 being no pain and 10 being the worst pain imaginable.  Pain Location: LB     Leisure: sports,exercise,kickball, bike ,hiking  Prior Level of Function: I c/ ADL's  Pts goals: go back to exercises more, not has issues with basic function"     Objective     Baseline Isometric Testing on Med X equipment: Testing administered by PT  Date of testin19  ROM 0-42   Max Peak Torque 225    Min Peak Torque 187    Flex/Ext Ratio 1.2:1   % below normative data 7below     Midpoint Isometric Testing on Med X equipment: Testing administered by PT  Date of testin19  ROM 0-48   Max Peak Torque 322    Min Peak Torque 271   Flex/Ext Ratio 1.18:1   % Above normative data 38%         Treatment    Pt was instructed in and performed the following:     Chip received therapeutic exercises to develop/improved posture, cardiovascular endurance, muscular endurance, lumbar/cervical ROM, strength and muscular endurance for 60 minutes including the following exercises:     [facility] 10/3/2019   Visit Number 14   VAS " Pain Rating 0   Recumbent Bike Seat Pos. -   Time 10   Extension in Lying 20   Manual Therapy 10   Femur Restraint -   Top Dead Center -   Counterweight -   Lumbar Flexion -   Lumbar Extension -   Lumbar Peak Torque -   Min Torque -   Test Percent Below Normative Data -   Test Percent Gain in Strength from Initial  -   Lumbar Weight 102   Repetitions 20   Rating of Perceived Exertion 7   Ice - Z Lie (in min.) 0       Exercises completd this visit:  EOE 10x  LTR 10x  Open Book 10x  Quad thoracic rotation 10x    Exercises completed on previous visits/HEP:    Seated Rotation 10x  Scapular retracions 10x  EIS with towel        Peripheral muscle strengthening which included 1 set of 15-20 repetitions at a slow, controlled 10-13 second per rep pace focused on strengthening supporting musculature for improved body mechanics and functional mobility.  Pt and therapist focused on proper form during treatment to ensure optimal strengthening of each targeted muscle group.  Machines were utilized including torso rotation, leg extension, leg curl, chest press, upright row. Tricep extension, bicep curl, leg press, and hip abduction added visit 3    Chip received the following manual therapy techniques:FDN, see noted below    Home Exercises Provided and Patient Education Provided   EIL 10x  EIS 10x with towel  LTR 10x  Scapular retracions 10x  Open Book 10x  Piriformis stretch 3x 20 secs  Quadraped thoracic rotation    Education provided:   - Educated pt on the importance of daily stretch to increase the benefit of program and positive results.     Written Home Exercises Provided: yes.  Exercises were reviewed and Chip was able to demonstrate them prior to the end of the session.  Chip demonstrated good  understanding of the education provided.     See EMR under Patient Instructions for exercises provided prior visit.    Assessment     Pt able to complete 20 reps today with 7/10 exertion level.  Attempted to adjust ROM, however  pt reports pain in mid range on Med X and not particularly at end or beginning ROM.   Assess ROM again next visit, keep weight same  Patient is making good progress towards established goals.  Pt will continue to benefit from skilled outpatient physical therapy to address the deficits stated in the impairment chart, provide pt/family education and to maximize pt's level of independence in the home and community environment.     Anticipated Barriers for therapy: none  Pt's spiritual, cultural and educational needs considered and pt agreeable to plan of care and goals as stated below:           GOALS: Pt is in agreement with the following goals.     Short term goals:  6 weeks or 10 visits   1.  Pt will demonstrate increased lumbar ROM by at least 3 degrees from the initial ROM value with improvements noted in functional ROM and ability to perform ADLs. Met 9/16/19  2.  Pt will demonstrate increased maximum isometric torque value by 10% when compared to the initial value resulting in improved ability to perform bending, lifting, and carrying activities safely, confidently. Met 9/16/19     3.  Patient report a reduction in worst pain score by 1-2 points for improved tolerance for .standing activities. Met 9/16/19  4.  Pt able to perform HEP correctly with minimal cueing or supervision from therapist to encourage independent management of symptoms.  Met 9/16/19        Long term goals: 13 weeks or 20 visits   1. Pt will demonstrate increased lumbar ROM by at least 6 degrees from initial ROM value, resulting in improved ability to perform functional fwd bending while standing and sitting.  Met 9/16/19  2. Pt will demonstrate increased maximum isometric torque value by 30% when compared to the initial value resulting in improved ability to perform bending, lifting, and carrying activities safely, confidently.   3. Pt to demonstrate ability to independently control and reduce their pain through posture positioning and  mechanical movements throughout a typical day.  4.  Pt will demonstrate reduced pain and improved functional outcomes as reported on the FOTO by reaching a score of CK = at least 40% but < 60% impaired, limited or restricted or less in order to demonstrate subjective improvement in pt's condition.    5. Pt will demonstrate independence with the HEP at discharge  6.  Pt(patient goal)will be able to return to hiking and sports with min LBP          Plan   Continue with established Plan of Care towards established PT goals.       Dry Needling Daily Note     Patient ID: Chip Arora is a 39 y.o. male.  Diagnosis: No diagnosis found.  Date:  10/03/2019    Start Time:  310  Stop Time:  320      Subjective:     Pt reports: He wishes to attempt FDN    Pain Scale: Chip rates pain on a scale of 0-10 to be 3 currently.      Objective:     Pt signed written consent to dry needling Rx.  Pt gave verbal consent for DN.   Pt rec'd dry needling to Thoracic/LB with 1 and 1.5 in needles with no adverse effects.    Homeostatic points:  1. Deep Radial  2. Greater Auricular  3. Spinal Accessory  4. Saphenous  5. Deep Fibular  6. Tibial  7. Greater Occipital  8. Suprascapular ( infraspinatus)  9. Lateral Antebrachial Cutaneous  10. Sural  11. Lateral Popliteal  12. Superficial Radial  13. Dorsal Scapular  14. Superior Cluneal  15. Posterior Cutaneous L 2  16. Inferior Gluteal  17. Lateral Pectoral  18. Ilitotibal  19. Infraorbital  20. Spinous process T7  21. Posterior cutaneous  T6  22. Posterior cutaneous L 5  23. Supraorbital  24. Common fibular    Paravertebral Points:  L1-L5 B 1.5 in  Thoracic B 1 inch T 7-T 12    Symptomatic Points:   As above    Assessment:     Patient demonstrated appropriate response to FDN. Pt had no adverse effects noted.  Pt reports he felt some twitching in the Left paraspinals in the Lumbar region with needling.    Patient Education/Response:     Education provided re: discussed increasing dosage as  tab Saunders verbalized good understanding of education     Plans and Goals:     Monitor response to FDN. Continue with FDN in POC as tolerated.     Nita Foreman, PT  10/3/2019

## 2019-10-08 ENCOUNTER — CLINICAL SUPPORT (OUTPATIENT)
Dept: REHABILITATION | Facility: OTHER | Age: 39
End: 2019-10-08
Attending: PHYSICAL MEDICINE & REHABILITATION
Payer: COMMERCIAL

## 2019-10-08 DIAGNOSIS — M62.81 WEAKNESS OF TRUNK MUSCULATURE: ICD-10-CM

## 2019-10-08 DIAGNOSIS — M54.41 CHRONIC RIGHT-SIDED LOW BACK PAIN WITH RIGHT-SIDED SCIATICA: ICD-10-CM

## 2019-10-08 DIAGNOSIS — G89.29 CHRONIC RIGHT-SIDED LOW BACK PAIN WITH RIGHT-SIDED SCIATICA: ICD-10-CM

## 2019-10-08 PROCEDURE — 97110 THERAPEUTIC EXERCISES: CPT

## 2019-10-08 NOTE — PROGRESS NOTES
"[facility]      Name: Chip Arora  Clinic Number: [id number]    Therapy Diagnosis:   Encounter Diagnoses   Name Primary?    Weakness of trunk musculature     Chronic right-sided low back pain with right-sided sciatica      Physician: Miriam Hughes, *    Visit Date: 10/8/2019    Evaluation Date: 2019  Authorization Period Expiration: 19  Plan of Care Expiration: 19  Reassessment Due:19  Visit # / Visits authorized: ( maintain weight)     Time In: 10:30   Time Out: 11:30  Total Billable Time: 40 minutes     Precautions: Standard and Diabetes, HTN, anxiety,depression     Pattern of pain determined: 1          PEP       Subjective   Chip reports that he has more tightness and stiffness than pain at this time. Pt reports that the leg pain is not as often, not daily, but he still gets flair ups.      Patient reports tolerating previous visit minimal DOMS.  Patient reports their pain to be3 /10 on a 0-10 scale with 0 being no pain and 10 being the worst pain imaginable.  Pain Location: LB     Leisure: sports,exercise,kickball, bike ,hiking  Prior Level of Function: I c/ ADL's  Pts goals: go back to exercises more, not has issues with basic function"     Objective     Baseline Isometric Testing on Med X equipment: Testing administered by PT  Date of testin19  ROM 0-42   Max Peak Torque 225    Min Peak Torque 187    Flex/Ext Ratio 1.2:1   % below normative data 7below     Midpoint Isometric Testing on Med X equipment: Testing administered by PT  Date of testin19  ROM 0-48   Max Peak Torque 322    Min Peak Torque 271   Flex/Ext Ratio 1.18:1   % Above normative data 38%         Treatment    Pt was instructed in and performed the following:     Chip received therapeutic exercises to develop/improved posture, cardiovascular endurance, muscular endurance, lumbar/cervical ROM, strength and muscular endurance for 60 minutes including the " following exercises:     [facility] 10/8/2019   Visit Number 15   VAS Pain Rating 0   Recumbent Bike Seat Pos. -   Time 10   Lumbar Weight 102   Repetitions 20   Rating of Perceived Exertion 4   Ice - Z Lie (in min.) 0         Exercises completd this visit:  EOE 10x  LTR 10x  Open Book 10x  Quad thoracic rotation 10x  Supine sciatic nerve glide 10x, with ankle 10x (D/C next visit)  Supine hook lying hip add isometric  Clam shells 15 x    Exercises completed on previous visits/HEP:    Seated Rotation 10x  Scapular retracions 10x  EIS with towel        Peripheral muscle strengthening which included 1 set of 15-20 repetitions at a slow, controlled 10-13 second per rep pace focused on strengthening supporting musculature for improved body mechanics and functional mobility.  Pt and therapist focused on proper form during treatment to ensure optimal strengthening of each targeted muscle group.  Machines were utilized including torso rotation, leg extension, leg curl, chest press, upright row. Tricep extension, bicep curl, leg press, and hip abduction added visit 3    Chip received the following manual therapy techniques:FDN, see noted below    Home Exercises Provided and Patient Education Provided   EIL 10x  EIS 10x with towel  LTR 10x  Scapular retracions 10x  Open Book 10x  Piriformis stretch 3x 20 secs  Quadraped thoracic rotation    Education provided:   - Educated pt on the importance of daily stretch to increase the benefit of program and positive results.     Written Home Exercises Provided: yes.  Exercises were reviewed and Chip was able to demonstrate them prior to the end of the session.  Chip demonstrated good  understanding of the education provided.     See EMR under Patient Instructions for exercises provided prior visit.    Assessment     Pt was able to complete treatment this visit without increase in low back pain, however continues to have discomfort on the lumbar extension medx under the  exercise load at the 0 deg extension hold and at approx 30 deg. This started happening under increased loads, plan to maintain weight on the lumbar medx until he is able to tolerate the weight without increased discomfort. Added hip ADD isometric for a contract relax component and neuro re education of the hip muscles where he felt like he was tight. Also added sciatic nerve glide, he did not have improvement in tightness with sciatic nerve glide, will revisit exercise at a later date.      Patient is making good progress towards established goals.  Pt will continue to benefit from skilled outpatient physical therapy to address the deficits stated in the impairment chart, provide pt/family education and to maximize pt's level of independence in the home and community environment.     Anticipated Barriers for therapy: none  Pt's spiritual, cultural and educational needs considered and pt agreeable to plan of care and goals as stated below:           GOALS: Pt is in agreement with the following goals.     Short term goals:  6 weeks or 10 visits   1.  Pt will demonstrate increased lumbar ROM by at least 3 degrees from the initial ROM value with improvements noted in functional ROM and ability to perform ADLs. Met 9/16/19  2.  Pt will demonstrate increased maximum isometric torque value by 10% when compared to the initial value resulting in improved ability to perform bending, lifting, and carrying activities safely, confidently. Met 9/16/19     3.  Patient report a reduction in worst pain score by 1-2 points for improved tolerance for .standing activities. Met 9/16/19  4.  Pt able to perform HEP correctly with minimal cueing or supervision from therapist to encourage independent management of symptoms.  Met 9/16/19        Long term goals: 13 weeks or 20 visits   1. Pt will demonstrate increased lumbar ROM by at least 6 degrees from initial ROM value, resulting in improved ability to perform functional fwd bending while  standing and sitting.  Met 9/16/19  2. Pt will demonstrate increased maximum isometric torque value by 30% when compared to the initial value resulting in improved ability to perform bending, lifting, and carrying activities safely, confidently.   3. Pt to demonstrate ability to independently control and reduce their pain through posture positioning and mechanical movements throughout a typical day.  4.  Pt will demonstrate reduced pain and improved functional outcomes as reported on the FOTO by reaching a score of CK = at least 40% but < 60% impaired, limited or restricted or less in order to demonstrate subjective improvement in pt's condition.    5. Pt will demonstrate independence with the HEP at discharge  6.  Pt(patient goal)will be able to return to hiking and sports with min LBP          Plan   Continue with established Plan of Care towards established PT goals.       Dry Needling Daily Note     Patient ID: Chip Arora is a 39 y.o. male.  Diagnosis:   1. Weakness of trunk musculature     2. Chronic right-sided low back pain with right-sided sciatica       Date:  10/08/2019    Start Time:  310  Stop Time:  320      Subjective:     Pt reports: He wishes to attempt FDN    Pain Scale: Chip rates pain on a scale of 0-10 to be 3 currently.      Objective:     Pt signed written consent to dry needling Rx.  Pt gave verbal consent for DN.   Pt rec'd dry needling to Thoracic/LB with 1 and 1.5 in needles with no adverse effects.    Homeostatic points:  1. Deep Radial  2. Greater Auricular  3. Spinal Accessory  4. Saphenous  5. Deep Fibular  6. Tibial  7. Greater Occipital  8. Suprascapular ( infraspinatus)  9. Lateral Antebrachial Cutaneous  10. Sural  11. Lateral Popliteal  12. Superficial Radial  13. Dorsal Scapular  14. Superior Cluneal  15. Posterior Cutaneous L 2  16. Inferior Gluteal  17. Lateral Pectoral  18. Ilitotibal  19. Infraorbital  20. Spinous process T7  21. Posterior cutaneous  T6  22. Posterior  cutaneous L 5  23. Supraorbital  24. Common fibular    Paravertebral Points:  L1-L5 B 1.5 in  Thoracic B 1 inch T 7-T 12    Symptomatic Points:   As above    Assessment:     Patient demonstrated appropriate response to FDN. Pt had no adverse effects noted.  Pt reports he felt some twitching in the Left paraspinals in the Lumbar region with needling.    Patient Education/Response:     Education provided re: discussed increasing dosage as needed  Chip verbalized good understanding of education     Plans and Goals:     Monitor response to FDN. Continue with FDN in POC as tolerated.     Carolina Avila, PT  10/8/2019

## 2019-10-16 ENCOUNTER — CLINICAL SUPPORT (OUTPATIENT)
Dept: REHABILITATION | Facility: OTHER | Age: 39
End: 2019-10-16
Attending: PHYSICAL MEDICINE & REHABILITATION
Payer: COMMERCIAL

## 2019-10-16 DIAGNOSIS — M54.41 CHRONIC RIGHT-SIDED LOW BACK PAIN WITH RIGHT-SIDED SCIATICA: ICD-10-CM

## 2019-10-16 DIAGNOSIS — M62.81 WEAKNESS OF TRUNK MUSCULATURE: ICD-10-CM

## 2019-10-16 DIAGNOSIS — G89.29 CHRONIC RIGHT-SIDED LOW BACK PAIN WITH RIGHT-SIDED SCIATICA: ICD-10-CM

## 2019-10-16 PROCEDURE — 97110 THERAPEUTIC EXERCISES: CPT

## 2019-10-16 NOTE — PATIENT INSTRUCTIONS
Arm / Leg Extension: Alternate (All-Fours)        Raise right arm and opposite leg. Do not arch neck.  Repeat ____ times per set. Do ____ sets per session. Do ____ sessions per day.     [email or web address]     Copyright © [employer]. All rights reserved.

## 2019-10-16 NOTE — PROGRESS NOTES
"[facility]      Name: Chip Arora  Clinic Number: [id number]    Therapy Diagnosis:   Encounter Diagnoses   Name Primary?    Weakness of trunk musculature     Chronic right-sided low back pain with right-sided sciatica      Physician: Miriam Hughes, *    Visit Date: 10/16/2019    Evaluation Date: 2019  Authorization Period Expiration: 19  Plan of Care Expiration: 19  Reassessment Due:19  Visit # / Visits authorized:      Time In: 515  Time Out: 600  Total Billable Time: 45 minutes     Precautions: Standard and Diabetes, HTN, anxiety,depression     Pattern of pain determined: 1          PEP       Subjective   Chip reports he is feeling better today.  States FDN did not seem to work after 15 min last session  Patient reports tolerating previous visit minimal DOMS.  Patient reports their pain to be3 /10 on a 0-10 scale with 0 being no pain and 10 being the worst pain imaginable.  Pain Location: LB     Leisure: sports,exercise,kickball, bike ,hiking  Prior Level of Function: I c/ ADL's  Pts goals: go back to exercises more, not has issues with basic function"     Objective     Baseline Isometric Testing on Med X equipment: Testing administered by PT  Date of testin19  ROM 0-42   Max Peak Torque 225    Min Peak Torque 187    Flex/Ext Ratio 1.2:1   % below normative data 7below     Midpoint Isometric Testing on Med X equipment: Testing administered by PT  Date of testin19  ROM 0-42   Max Peak Torque 322    Min Peak Torque 271   Flex/Ext Ratio 1.18:1   % Above normative data 38%         Treatment    Pt was instructed in and performed the following:     Chip received therapeutic exercises to develop/improved posture, cardiovascular endurance, muscular endurance, lumbar/cervical ROM, strength and muscular endurance for 60 minutes including the following exercises:        [facility] 10/16/2019   Visit Number 16   VAS Pain Rating 0 "   Recumbent Bike Seat Pos. -   Time 10   Extension in Lying 20   Manual Therapy -   Femur Restraint -   Top Dead Center -   Counterweight -   Lumbar Flexion -   Lumbar Extension -   Lumbar Peak Torque -   Min Torque -   Test Percent Below Normative Data -   Test Percent Gain in Strength from Initial  -   Lumbar Weight 107   Repetitions 15   Rating of Perceived Exertion 4   Ice - Z Lie (in min.) 0         Exercises completd this visit:  EOE 10x  LTR 10x  Open Book 10x  Quad thoracic rotation 10x  Supine sciatic nerve glide 10x, with ankle 10x (D/C next visit)  Supine hook lying hip add isometric  Clam shells 15 x  Added bird dog      Exercises completed on previous visits/HEP:    Seated Rotation 10x  Scapular retracions 10x  EIS with towel        Peripheral muscle strengthening which included 1 set of 15-20 repetitions at a slow, controlled 10-13 second per rep pace focused on strengthening supporting musculature for improved body mechanics and functional mobility.  Pt and therapist focused on proper form during treatment to ensure optimal strengthening of each targeted muscle group.  Machines were utilized including torso rotation, leg extension, leg curl, chest press, upright row. Tricep extension, bicep curl, leg press, and hip abduction added visit 3    Chip received the following manual therapy techniques:none    Home Exercises Provided and Patient Education Provided   EIL 10x  EIS 10x with towel  LTR 10x  Scapular retracions 10x  Open Book 10x  Piriformis stretch 3x 20 secs  Quadraped thoracic rotation    Education provided:   - Educated pt on the importance of daily stretch to increase the benefit of program and positive results.     Written Home Exercises Provided: yes.  Exercises were reviewed and Chip was able to demonstrate them prior to the end of the session.  Chip demonstrated good  understanding of the education provided.   Added bird dogs  See EMR under Patient Instructions for exercises provided  prior visit.    Assessment     Pt arrives today and reports he feels his back is somewhat better and stronger.  Pt states he is only able to come 1x/wk and is transitioning to his gym to work out the other days of the week.  Added bird dogs for HEP.  Pt was able to complete 15 reps at 5% increase today.  Patient is making good progress towards established goals.  Pt will continue to benefit from skilled outpatient physical therapy to address the deficits stated in the impairment chart, provide pt/family education and to maximize pt's level of independence in the home and community environment.     Anticipated Barriers for therapy: none  Pt's spiritual, cultural and educational needs considered and pt agreeable to plan of care and goals as stated below:           GOALS: Pt is in agreement with the following goals.     Short term goals:  6 weeks or 10 visits   1.  Pt will demonstrate increased lumbar ROM by at least 3 degrees from the initial ROM value with improvements noted in functional ROM and ability to perform ADLs. Met 9/16/19  2.  Pt will demonstrate increased maximum isometric torque value by 10% when compared to the initial value resulting in improved ability to perform bending, lifting, and carrying activities safely, confidently. Met 9/16/19     3.  Patient report a reduction in worst pain score by 1-2 points for improved tolerance for .standing activities. Met 9/16/19  4.  Pt able to perform HEP correctly with minimal cueing or supervision from therapist to encourage independent management of symptoms.  Met 9/16/19        Long term goals: 13 weeks or 20 visits   1. Pt will demonstrate increased lumbar ROM by at least 6 degrees from initial ROM value, resulting in improved ability to perform functional fwd bending while standing and sitting.  Met 9/16/19  2. Pt will demonstrate increased maximum isometric torque value by 30% when compared to the initial value resulting in improved ability to perform  bending, lifting, and carrying activities safely, confidently.   3. Pt to demonstrate ability to independently control and reduce their pain through posture positioning and mechanical movements throughout a typical day.  4.  Pt will demonstrate reduced pain and improved functional outcomes as reported on the FOTO by reaching a score of CK = at least 40% but < 60% impaired, limited or restricted or less in order to demonstrate subjective improvement in pt's condition.    5. Pt will demonstrate independence with the HEP at discharge  6.  Pt(patient goal)will be able to return to hiking and sports with min LBP          Plan   Continue with established Plan of Care towards established PT goals.     Nita Foreman  10/16/19

## 2019-10-25 ENCOUNTER — CLINICAL SUPPORT (OUTPATIENT)
Dept: REHABILITATION | Facility: OTHER | Age: 39
End: 2019-10-25
Attending: PHYSICAL MEDICINE & REHABILITATION
Payer: COMMERCIAL

## 2019-10-25 DIAGNOSIS — M62.81 WEAKNESS OF TRUNK MUSCULATURE: ICD-10-CM

## 2019-10-25 DIAGNOSIS — M54.41 CHRONIC RIGHT-SIDED LOW BACK PAIN WITH RIGHT-SIDED SCIATICA: ICD-10-CM

## 2019-10-25 DIAGNOSIS — G89.29 CHRONIC RIGHT-SIDED LOW BACK PAIN WITH RIGHT-SIDED SCIATICA: ICD-10-CM

## 2019-10-25 PROCEDURE — 97110 THERAPEUTIC EXERCISES: CPT

## 2019-10-25 NOTE — PROGRESS NOTES
"[facility]      Name: Chip Arora  Clinic Number: [id number]    Therapy Diagnosis:   No diagnosis found.  Physician: Miriam Hughes, *    Visit Date: 10/25/2019    Evaluation Date: 2019  Authorization Period Expiration: 19  Plan of Care Expiration: 19  Reassessment Due:19  Visit # / Visits authorized:      Time In: 200  Time Out: 300  Total Billable Time: 45 minutes     Precautions: Standard and Diabetes, HTN, anxiety,depression     Pattern of pain determined: 1          PEP       Subjective   Chip reports mild back pain today   Patient reports tolerating previous visit minimal DOMS.  Patient reports their pain to be 2 /10 on a 0-10 scale with 0 being no pain and 10 being the worst pain imaginable.  Pain Location: LB     Leisure: sports,exercise,kickball, bike ,hiking  Prior Level of Function: I c/ ADL's  Pts goals: go back to exercises more, not has issues with basic function"     Objective     Baseline Isometric Testing on Med X equipment: Testing administered by PT  Date of testin19  ROM 0-42   Max Peak Torque 225    Min Peak Torque 187    Flex/Ext Ratio 1.2:1   % below normative data 7below     Midpoint Isometric Testing on Med X equipment: Testing administered by PT  Date of testin19  ROM 0-42   Max Peak Torque 322    Min Peak Torque 271   Flex/Ext Ratio 1.18:1   % Above normative data 38%         Treatment    Pt was instructed in and performed the following:     Chip received therapeutic exercises to develop/improved posture, cardiovascular endurance, muscular endurance, lumbar/cervical ROM, strength and muscular endurance for 60 minutes including the following exercises:   [facility] 10/25/2019   Visit Number 17   VAS Pain Rating 2   Recumbent Bike Seat Pos. -   Time 10   Extension in Lying 10   Manual Therapy -   Femur Restraint -   Top Dead Center -   Counterweight -   Lumbar Flexion -   Lumbar Extension - "   Lumbar Peak Torque -   Min Torque -   Test Percent Below Normative Data -   Test Percent Gain in Strength from Initial  -   Lumbar Weight 107   Repetitions 17   Rating of Perceived Exertion 4   Ice - Z Lie (in min.) -     Exercises completd this visit:  EOE 10x  LTR 10x  Open Book 10x  Quad thoracic rotation 10x  Supine sciatic nerve glide 10x, with ankle 10x (D/C next visit)  Supine hook lying hip add isometric  Clam shells 15 x  Added bird dog      Exercises completed on previous visits/HEP:    Seated Rotation 10x  Scapular retracions 10x  EIS with towel        Peripheral muscle strengthening which included 1 set of 15-20 repetitions at a slow, controlled 10-13 second per rep pace focused on strengthening supporting musculature for improved body mechanics and functional mobility.  Pt and therapist focused on proper form during treatment to ensure optimal strengthening of each targeted muscle group.  Machines were utilized including torso rotation, leg extension, leg curl, chest press, upright row. Tricep extension, bicep curl, leg press, and hip abduction added visit 3    Chip received the following manual therapy techniques:none    Home Exercises Provided and Patient Education Provided   EIL 10x  EIS 10x with towel  LTR 10x  Scapular retracions 10x  Open Book 10x  Piriformis stretch 3x 20 secs  Quadraped thoracic rotation    Education provided:   - Educated pt on the importance of daily stretch to increase the benefit of program and positive results.     Written Home Exercises Provided: yes.  Exercises were reviewed and Chip was able to demonstrate them prior to the end of the session.  Chip demonstrated good  understanding of the education provided.   Added bird dogs  See EMR under Patient Instructions for exercises provided prior visit.    Assessment     Pt able to complete all components of session with no adverse effects. Able to complete 17 repetitions at previously established resistance. Will continue  at current resistance until 20 repetitions are achieved.   Patient is making good progress towards established goals.  Pt will continue to benefit from skilled outpatient physical therapy to address the deficits stated in the impairment chart, provide pt/family education and to maximize pt's level of independence in the home and community environment.     Anticipated Barriers for therapy: none  Pt's spiritual, cultural and educational needs considered and pt agreeable to plan of care and goals as stated below:           GOALS: Pt is in agreement with the following goals.     Short term goals:  6 weeks or 10 visits   1.  Pt will demonstrate increased lumbar ROM by at least 3 degrees from the initial ROM value with improvements noted in functional ROM and ability to perform ADLs. Met 9/16/19  2.  Pt will demonstrate increased maximum isometric torque value by 10% when compared to the initial value resulting in improved ability to perform bending, lifting, and carrying activities safely, confidently. Met 9/16/19     3.  Patient report a reduction in worst pain score by 1-2 points for improved tolerance for .standing activities. Met 9/16/19  4.  Pt able to perform HEP correctly with minimal cueing or supervision from therapist to encourage independent management of symptoms.  Met 9/16/19        Long term goals: 13 weeks or 20 visits   1. Pt will demonstrate increased lumbar ROM by at least 6 degrees from initial ROM value, resulting in improved ability to perform functional fwd bending while standing and sitting.  Met 9/16/19  2. Pt will demonstrate increased maximum isometric torque value by 30% when compared to the initial value resulting in improved ability to perform bending, lifting, and carrying activities safely, confidently.   3. Pt to demonstrate ability to independently control and reduce their pain through posture positioning and mechanical movements throughout a typical day.  4.  Pt will demonstrate reduced  pain and improved functional outcomes as reported on the FOTO by reaching a score of CK = at least 40% but < 60% impaired, limited or restricted or less in order to demonstrate subjective improvement in pt's condition.    5. Pt will demonstrate independence with the HEP at discharge  6.  Pt(patient goal)will be able to return to hiking and sports with min LBP          Plan   Continue with established Plan of Care towards established PT goals.     Luis Vitale  10/16/19

## 2019-11-04 ENCOUNTER — CLINICAL SUPPORT (OUTPATIENT)
Dept: REHABILITATION | Facility: OTHER | Age: 39
End: 2019-11-04
Attending: PHYSICAL MEDICINE & REHABILITATION
Payer: COMMERCIAL

## 2019-11-04 DIAGNOSIS — M62.81 WEAKNESS OF TRUNK MUSCULATURE: ICD-10-CM

## 2019-11-04 DIAGNOSIS — M54.41 CHRONIC RIGHT-SIDED LOW BACK PAIN WITH RIGHT-SIDED SCIATICA: ICD-10-CM

## 2019-11-04 DIAGNOSIS — G89.29 CHRONIC RIGHT-SIDED LOW BACK PAIN WITH RIGHT-SIDED SCIATICA: ICD-10-CM

## 2019-11-04 PROCEDURE — 97110 THERAPEUTIC EXERCISES: CPT

## 2019-11-04 NOTE — PROGRESS NOTES
"[facility]      Name: Chip Arora  Clinic Number: [id number]    Therapy Diagnosis:   Encounter Diagnoses   Name Primary?    Weakness of trunk musculature     Chronic right-sided low back pain with right-sided sciatica      Physician: Miriam Hughes, *    Visit Date: 2019    Evaluation Date: 2019  Authorization Period Expiration: 19  Plan of Care Expiration: 19  Reassessment Due: 19  Visit # / Visits authorized:      Time In: 1006  Time Out: 1100  Total Billable Time: 50 minutes     Precautions: Standard and Diabetes, HTN, anxiety,depression     Pattern of pain determined: 1          PEP       Subjective   Chip arrives too PT and reports no new symptoms, but with a mild LBP of 2/10. He feels "soreness rather than pain", he states.       Patient reports tolerating previous visit minimal DOMS.    Patient reports their pain to be 2 /10 on a 0-10 scale with 0 being no pain and 10 being the worst pain imaginable.  Pain Location: LB     Leisure: sports,exercise,kickball, bike ,hiking  Prior Level of Function: I c/ ADL's  Pts goals: go back to exercises more, not has issues with basic function"     Objective     Baseline Isometric Testing on Med X equipment: Testing administered by PT  Date of testin19  ROM 0-42   Max Peak Torque 225    Min Peak Torque 187    Flex/Ext Ratio 1.2:1   % below normative data 7below     Midpoint Isometric Testing on Med X equipment: Testing administered by PT  Date of testin19  ROM 0-42   Max Peak Torque 322    Min Peak Torque 271   Flex/Ext Ratio 1.18:1   % Above normative data 38%         Treatment    Pt was instructed in and performed the following:     Chip received therapeutic exercises to develop/improved posture, cardiovascular endurance, muscular endurance, lumbar/cervical ROM, strength and muscular endurance for 50 minutes including the following exercises:     [facility] 2019 "   Visit Number 18   VAS Pain Rating 2   Recumbent Bike Seat Pos. -   Time 5   Extension in Lying 10   Manual Therapy -   Femur Restraint -   Top Dead Center -   Counterweight -   Lumbar Flexion -   Lumbar Extension -   Lumbar Peak Torque -   Min Torque -   Test Percent Below Normative Data -   Test Percent Gain in Strength from Initial  -   Lumbar Weight 107   Repetitions 19   Rating of Perceived Exertion 5.5   Ice - Z Lie (in min.) 0         Exercises completd this visit:  EOE 10x  LTR 10x  Open Book 10x  Quad thoracic rotation 10x  Supine sciatic nerve glide 10x, with ankle 10x (D/C next visit)  Supine hook lying hip add isometric  Clam shells 15 x with GTB  Added bird dog    Exercises completed on previous visits/HEP:    Seated Rotation 10x  Scapular retracions 10x  EIS with towel        Peripheral muscle strengthening which included 1 set of 15-20 repetitions at a slow, controlled 10-13 second per rep pace focused on strengthening supporting musculature for improved body mechanics and functional mobility.  Pt and therapist focused on proper form during treatment to ensure optimal strengthening of each targeted muscle group.  Machines were utilized including torso rotation, leg extension, leg curl, chest press, upright row. Tricep extension, bicep curl, leg press, and hip abduction added visit 3    Chip received the following manual therapy techniques:none    Home Exercises Provided and Patient Education Provided   EIL 10x  EIS 10x with towel  LTR 10x  Scapular retracions 10x  Open Book 10x  Piriformis stretch 3x 20 secs  Quadraped thoracic rotation    Education provided:   - Educated pt on the importance of daily stretch to increase the benefit of program and positive results.     Written Home Exercises Provided: yes.  Exercises were reviewed and Chip was able to demonstrate them prior to the end of the session.  Chip demonstrated good  understanding of the education provided.   Added bird dogs  See EMR under  Patient Instructions for exercises provided prior visit.    Assessment     Patient completed 19 reps at an RPE of 5-6 today. He had no new c/o with therex. He is displaying good exercise progression tolerance. Complete 20 reps at next visit.     Patient is making good progress towards established goals.  Pt will continue to benefit from skilled outpatient physical therapy to address the deficits stated in the impairment chart, provide pt/family education and to maximize pt's level of independence in the home and community environment.     Anticipated Barriers for therapy: none  Pt's spiritual, cultural and educational needs considered and pt agreeable to plan of care and goals as stated below:           GOALS: Pt is in agreement with the following goals.     Short term goals:  6 weeks or 10 visits   1.  Pt will demonstrate increased lumbar ROM by at least 3 degrees from the initial ROM value with improvements noted in functional ROM and ability to perform ADLs. Met 9/16/19  2.  Pt will demonstrate increased maximum isometric torque value by 10% when compared to the initial value resulting in improved ability to perform bending, lifting, and carrying activities safely, confidently. Met 9/16/19     3.  Patient report a reduction in worst pain score by 1-2 points for improved tolerance for .standing activities. Met 9/16/19  4.  Pt able to perform HEP correctly with minimal cueing or supervision from therapist to encourage independent management of symptoms.  Met 9/16/19        Long term goals: 13 weeks or 20 visits   1. Pt will demonstrate increased lumbar ROM by at least 6 degrees from initial ROM value, resulting in improved ability to perform functional fwd bending while standing and sitting.  Met 9/16/19  2. Pt will demonstrate increased maximum isometric torque value by 30% when compared to the initial value resulting in improved ability to perform bending, lifting, and carrying activities safely, confidently.    3. Pt to demonstrate ability to independently control and reduce their pain through posture positioning and mechanical movements throughout a typical day.  4.  Pt will demonstrate reduced pain and improved functional outcomes as reported on the FOTO by reaching a score of CK = at least 40% but < 60% impaired, limited or restricted or less in order to demonstrate subjective improvement in pt's condition.    5. Pt will demonstrate independence with the HEP at discharge  6.  Pt(patient goal)will be able to return to hiking and sports with min LBP          Plan   Continue with established Plan of Care towards established PT goals.     Kim Wilson  10/16/19

## 2019-11-11 ENCOUNTER — PATIENT MESSAGE (OUTPATIENT)
Dept: INTERNAL MEDICINE | Facility: CLINIC | Age: 39
End: 2019-11-11

## 2019-11-14 ENCOUNTER — CLINICAL SUPPORT (OUTPATIENT)
Dept: REHABILITATION | Facility: OTHER | Age: 39
End: 2019-11-14
Attending: PHYSICAL MEDICINE & REHABILITATION
Payer: COMMERCIAL

## 2019-11-14 DIAGNOSIS — M62.81 WEAKNESS OF TRUNK MUSCULATURE: ICD-10-CM

## 2019-11-14 DIAGNOSIS — M54.40 CHRONIC RIGHT-SIDED LOW BACK PAIN WITH SCIATICA, SCIATICA LATERALITY UNSPECIFIED: ICD-10-CM

## 2019-11-14 DIAGNOSIS — G89.29 CHRONIC RIGHT-SIDED LOW BACK PAIN WITH SCIATICA, SCIATICA LATERALITY UNSPECIFIED: ICD-10-CM

## 2019-11-14 PROCEDURE — 97110 THERAPEUTIC EXERCISES: CPT | Performed by: PHYSICAL THERAPIST

## 2019-11-14 NOTE — PROGRESS NOTES
"[facility]      Name: Chip Arora  Clinic Number: [id number]    Therapy Diagnosis:   Encounter Diagnoses   Name Primary?    Weakness of trunk musculature     Chronic right-sided low back pain with sciatica, sciatica laterality unspecified      Physician: Miriam Hughes, *    Visit Date: 2019    Evaluation Date: 2019  Authorization Period Expiration: 19  Plan of Care Expiration: 19  Reassessment Due: 19  Visit # / Visits authorized:      Time In: 1035  Time Out: 1130  Total Billable Time: 50 minutes     Precautions: Standard and Diabetes, HTN, anxiety,depression     Pattern of pain determined: 1          PEP       Subjective   Chip arrives to PT and reports no new symptoms, but with a mild LBP of 2/10. He feels "soreness rather than pain", he states. He has been transitioning to his own gym without difficulty.  He appears pleased with his progress and will see one time more for Final IM testing      Patient reports tolerating previous visit minimal DOMS.    Patient reports their pain to be 2 /10 on a 0-10 scale with 0 being no pain and 10 being the worst pain imaginable.  Pain Location: LB     Leisure: sports,exercise,kickball, bike ,hiking  Prior Level of Function: I c/ ADL's  Pts goals: go back to exercises more, not has issues with basic function"     Objective     Baseline Isometric Testing on Med X equipment: Testing administered by PT  Date of testin19  ROM 0-42   Max Peak Torque 225    Min Peak Torque 187    Flex/Ext Ratio 1.2:1   % below normative data 7below     Midpoint Isometric Testing on Med X equipment: Testing administered by PT  Date of testin19  ROM 0-42   Max Peak Torque 322    Min Peak Torque 271   Flex/Ext Ratio 1.18:1   % Above normative data 38%         Treatment    Pt was instructed in and performed the following:  Chip received therapeutic exercises to develop/improved posture, cardiovascular " endurance, muscular endurance, lumbar ROM, strength and muscular endurance for 50 minutes including the following exercises:     [facility] 11/14/2019   Visit Number 19   VAS Pain Rating 2   Recumbent Bike Seat Pos. 10   Time 10   Extension in Lying 10   Lumbar Weight 107   Repetitions 20   Rating of Perceived Exertion 6   Ice - Z Lie (in min.) 10         Exercises completd this visit:  EOE 10x  LTR 10x  Open Book 10x  Quad thoracic rotation 10x  Clam shells 15 x with GTB   bird dog    Exercises completed on previous visits/HEP:    Seated Rotation 10x  Scapular retracions 10x  EIS with towel        Peripheral muscle strengthening which included 1 set of 15-20 repetitions at a slow, controlled 10-13 second per rep pace focused on strengthening supporting musculature for improved body mechanics and functional mobility.  Pt and therapist focused on proper form during treatment to ensure optimal strengthening of each targeted muscle group.  Machines were utilized including torso rotation, leg extension, leg curl, chest press, upright row. Tricep extension, bicep curl, leg press, and hip abduction added visit 3    Chip received the following manual therapy techniques:none    Home Exercises Provided and Patient Education Provided   EIL 10x  EIS 10x with towel  LTR 10x  Scapular retracions 10x  Open Book 10x  Piriformis stretch 3x 20 secs  Quadraped thoracic rotation    Education provided:   - Educated pt on the importance of daily stretch to increase the benefit of program and positive results.     Written Home Exercises Provided: yes.  Exercises were reviewed and Chip was able to demonstrate them prior to the end of the session.  Chip demonstrated good  understanding of the education provided.   Added bird dogs  See EMR under Patient Instructions for exercises provided prior visit.    Assessment     Patient completed 20 reps at an RPE of 5-6 today. He had no new c/o with therex. He is displaying good exercise  progression tolerance.. He has been transitioning back to his gym and doing well.  Patient is making good progress towards established goals  Pt will continue to benefit from skilled outpatient physical therapy to address the deficits stated in the impairment chart, provide pt/family education and to maximize pt's level of independence in the home and community environment.     Anticipated Barriers for therapy: none  Pt's spiritual, cultural and educational needs considered and pt agreeable to plan of care and goals as stated below:           GOALS: Pt is in agreement with the following goals.     Short term goals:  6 weeks or 10 visits   1.  Pt will demonstrate increased lumbar ROM by at least 3 degrees from the initial ROM value with improvements noted in functional ROM and ability to perform ADLs. Met 9/16/19  2.  Pt will demonstrate increased maximum isometric torque value by 10% when compared to the initial value resulting in improved ability to perform bending, lifting, and carrying activities safely, confidently. Met 9/16/19     3.  Patient report a reduction in worst pain score by 1-2 points for improved tolerance for .standing activities. Met 9/16/19  4.  Pt able to perform HEP correctly with minimal cueing or supervision from therapist to encourage independent management of symptoms.  Met 9/16/19        Long term goals: 13 weeks or 20 visits   1. Pt will demonstrate increased lumbar ROM by at least 6 degrees from initial ROM value, resulting in improved ability to perform functional fwd bending while standing and sitting.  Met 9/16/19  2. Pt will demonstrate increased maximum isometric torque value by 30% when compared to the initial value resulting in improved ability to perform bending, lifting, and carrying activities safely, confidently.   3. Pt to demonstrate ability to independently control and reduce their pain through posture positioning and mechanical movements throughout a typical day.  4.  Pt  will demonstrate reduced pain and improved functional outcomes as reported on the FOTO by reaching a score of CK = at least 40% but < 60% impaired, limited or restricted or less in order to demonstrate subjective improvement in pt's condition.    5. Pt will demonstrate independence with the HEP at discharge  6.  Pt(patient goal)will be able to return to hiking and sports with min LBP          Plan     See one time more for Final IM testing and progression to wellness    Amanda Arreola  10/16/19

## 2019-11-20 ENCOUNTER — PATIENT OUTREACH (OUTPATIENT)
Dept: ADMINISTRATIVE | Facility: OTHER | Age: 39
End: 2019-11-20

## 2019-11-20 ENCOUNTER — CLINICAL SUPPORT (OUTPATIENT)
Dept: REHABILITATION | Facility: OTHER | Age: 39
End: 2019-11-20
Attending: PHYSICAL MEDICINE & REHABILITATION
Payer: COMMERCIAL

## 2019-11-20 DIAGNOSIS — M62.81 WEAKNESS OF TRUNK MUSCULATURE: ICD-10-CM

## 2019-11-20 DIAGNOSIS — M54.40 CHRONIC RIGHT-SIDED LOW BACK PAIN WITH SCIATICA, SCIATICA LATERALITY UNSPECIFIED: ICD-10-CM

## 2019-11-20 DIAGNOSIS — G89.29 CHRONIC RIGHT-SIDED LOW BACK PAIN WITH SCIATICA, SCIATICA LATERALITY UNSPECIFIED: ICD-10-CM

## 2019-11-20 PROCEDURE — 97110 THERAPEUTIC EXERCISES: CPT

## 2019-11-20 NOTE — PROGRESS NOTES
"[facility]      Name: Chip Arora  Clinic Number: [id number]    Therapy Diagnosis:   Encounter Diagnoses   Name Primary?    Weakness of trunk musculature     Chronic right-sided low back pain with sciatica, sciatica laterality unspecified      Physician: Miriam Hughes, *    Visit Date: 2019    Evaluation Date: 2019  Authorization Period Expiration: 19  Plan of Care Expiration: 19  Reassessment Due: 19  Visit # / Visits authorized:      Time In: 200  Time Out: 300  Total Billable Time: 50 minutes     Precautions: Standard and Diabetes, HTN, anxiety,depression     Pattern of pain determined: 1          PEP       Subjective   Chip reports he experienced increased symptoms over the weekend. Pt states regardless of what he did he was unable to decrease flare up. Pt states overall flare ups have lesser frequency. Daily tasks such as showering, dressing, and transferring are much improved since initiation of therapy. Pt reports he wants to continue with current gym membership. Is interested in continuing with current exercises with some free weights mixed in.   Patient reports tolerating previous visit minimal DOMS.    Patient reports their pain to be 5 /10 on a 0-10 scale with 0 being no pain and 10 being the worst pain imaginable.  Pain Location: LB     Leisure: sports,exercise,kickball, bike ,hiking  Prior Level of Function: I c/ ADL's  Pts goals: go back to exercises more, not has issues with basic function"     Objective     Baseline Isometric Testing on Med X equipment: Testing administered by PT  Date of testin19  ROM 0-42   Max Peak Torque 225    Min Peak Torque 187    Flex/Ext Ratio 1.2:1   % below normative data 7below     Midpoint Isometric Testing on Med X equipment: Testing administered by PT  Date of testin19  ROM 0-42   Max Peak Torque 322    Min Peak Torque 271   Flex/Ext Ratio 1.18:1   % Above normative data 38% "     CMS Impairment/Limitation/Restriction for FOTO Lumbar Spine Survey  Status Limitation G-Code CMS Severity Modifier  Intake 38% 62%  Predicted 53% 47% Goal Status+ CK - At least 40 percent but less than 60 percent  9/16/2019 54% 46%  11/20/2019 63% 37% Current Status CJ - At least 20 percent but less than 40 percent    Treatment    Pt was instructed in and performed the following:  Chip received therapeutic exercises to develop/improved posture, cardiovascular endurance, muscular endurance, lumbar ROM, strength and muscular endurance for 50 minutes including the following exercises:   [facility] 11/20/2019   Visit Number 20   VAS Pain Rating 5   Recumbent Bike Seat Pos. -   Time 10   Extension in Lying 10   Manual Therapy -   Femur Restraint -   Top Dead Center -   Counterweight -   Lumbar Flexion 48   Lumbar Extension 0   Lumbar Peak Torque 290   Min Torque 148   Test Percent Below Normative Data -   Test Percent Gain in Strength from Initial  31   Lumbar Weight -   Repetitions -   Rating of Perceived Exertion -   Ice - Z Lie (in min.) 10     Exercises completd this visit:  EOE 10x  LTR 10x  Open Book 10x  Quad thoracic rotation 10x  Clam shells 15 x with GTB   bird dog    Exercises completed on previous visits/HEP:  Seated Rotation 10x  Scapular retracions 10x  EIS with towel    Peripheral muscle strengthening which included 1 set of 15-20 repetitions at a slow, controlled 10-13 second per rep pace focused on strengthening supporting musculature for improved body mechanics and functional mobility.  Pt and therapist focused on proper form during treatment to ensure optimal strengthening of each targeted muscle group.  Machines were utilized including torso rotation, leg extension, leg curl, chest press, upright row. Tricep extension, bicep curl, leg press, and hip abduction added visit 3    Chip received the following manual therapy techniques:none    Home Exercises Provided and Patient Education  Provided   EIL 10x  EIS 10x with towel  LTR 10x  Scapular retracions 10x  Open Book 10x  Piriformis stretch 3x 20 secs  Quadraped thoracic rotation    Education provided:   - Educated pt on the importance of daily stretch to increase the benefit of program and positive results.     Written Home Exercises Provided: yes.  Exercises were reviewed and Chip was able to demonstrate them prior to the end of the session.  Chip demonstrated good  understanding of the education provided.   Added bird dogs  See EMR under Patient Instructions for exercises provided prior visit.    Assessment     Outpatient Physical Therapy Discharge Summary    Date: 11/20/2019      Date of PT eval: 8/2/2019  Number of PT visits: 20  Date of last visit: 11/20/2019    Assessment:  Pt able to complete all components of session with no adverse effects. Pt values on medx dropped on this visit compared to 10 due to mild exacerbation of symptoms over the weekend. Pt is currently independent with HEP and all therex. Is motivated to continue with exercises and gym membership outside of therapy with DC.       Plan: Discharge from outpatient physical therapy due to completion of POC and 20 visits.     GOALS: Pt is in agreement with the following goals.     Short term goals:  6 weeks or 10 visits   1.  Pt will demonstrate increased lumbar ROM by at least 3 degrees from the initial ROM value with improvements noted in functional ROM and ability to perform ADLs. Met 9/16/19  2.  Pt will demonstrate increased maximum isometric torque value by 10% when compared to the initial value resulting in improved ability to perform bending, lifting, and carrying activities safely, confidently. Met 9/16/19     3.  Patient report a reduction in worst pain score by 1-2 points for improved tolerance for .standing activities. Met 9/16/19  4.  Pt able to perform HEP correctly with minimal cueing or supervision from therapist to encourage independent management of symptoms.   Met 9/16/19        Long term goals: 13 weeks or 20 visits   1. Pt will demonstrate increased lumbar ROM by at least 6 degrees from initial ROM value, resulting in improved ability to perform functional fwd bending while standing and sitting.  Met 9/16/19  2. Pt will demonstrate increased maximum isometric torque value by 30% when compared to the initial value resulting in improved ability to perform bending, lifting, and carrying activities safely, confidently. MET  3. Pt to demonstrate ability to independently control and reduce their pain through posture positioning and mechanical movements throughout a typical day. MET  4.  Pt will demonstrate reduced pain and improved functional outcomes as reported on the FOTO by reaching a score of CK = at least 40% but < 60% impaired, limited or restricted or less in order to demonstrate subjective improvement in pt's condition.  MET  5. Pt will demonstrate independence with the HEP at discharge MET  6.  Pt(patient goal)will be able to return to hiking and sports with min LBP MET          Plan     PT is DCed with most recent HEP. Pt wishes to continue HEP with independent gym membership. Does not wish to attend wellness at this time.   Luis Vitale  10/16/19

## 2019-11-21 NOTE — PROGRESS NOTES
Subjective:      Patient ID: Chip Arora is a 39 y.o. male.    Chief Complaint: Follow-up and Low-back Pain    Mr Arora is a 38 yo male here for follow up of low back pain.  He was last seen by me on 7/12/2019 and has had back pain for the past 20 years, but worse the past 2 months.  He feels like he usually has a flare once a year, but this one is lasting longer and getting worse.  MRI reviewed and sent to PT at [facility] and sent for right L5 and S1 Tf KARTHIK.  He did not get injection because of the way it is otf.  It would be too expensive.  He feels like the pain is much better on the day to day.  He can get up out of a chair, he can tie his shoes.  He still does have the flares and feels like those have not changed.  He feels like he has a flare once a month for 4 days.  He ends up missing a couple of days of work.  He does not feel like stretches help.  The pain is right lower back and sometimes can go to the left, but mostly on the right.  He feels like flexeril helps, but it makes him tired and he cannot function.  The pain is 2/10 now, worst 5/10 flare when woke up, best 1/10.  He feels like limps when has flare and gets pinches of pain when put weight on the right side    X-ray lumbar 6/26/2019  Mild DJD.  The lumbosacral disc space is slightly narrowed.  The other disc spaces are well maintained.  No fracture, spondylolisthesis or bone destruction identified    MRI report review 7/15/2019  L4-L5 central herniated disc  With cranial caudal subligamentous extension that is displacing bilateral L5 nerve root   L5-S1 central herniated disc displacing bilateral S1 nerve root   Annular fissure to L4-5, L5-S1   Bilateral Facet arthropathy L4-5, L5-S1  Foraminal stenosis  l4-5, l5-S1  Straightening of the normal lumbar lordosis      Past Medical History:  No date: Anxiety  No date: Bulge of lumbar disc without myelopathy  No date: Depression  No date: Diabetes mellitus, type 2      Comment:  sees endocrine;  Dr. Hernandez at [facility]; states due to                anthrax inoculation in the   1/10/2019: Essential hypertension  No date: Hx of psychiatric care  No date: Patellofemoral dysfunction      Comment:  right  No date: Psychiatric problem  No date: Rotator cuff disorder      Comment:  right  No date: Therapy    Past Surgical History:  No date: WISDOM TOOTH EXTRACTION    Review of patient's family history indicates:  Problem: Diabetes      Relation: Mother          Age of Onset: (Not Specified)          Comment: mild  Problem: Cancer      Relation: Maternal Grandmother          Age of Onset: (Not Specified)          Comment: breast      Social History    Socioeconomic History      Marital status:       Spouse name: Not on file      Number of children: Not on file      Years of education: Not on file      Highest education level: Not on file    Occupational History      Occupation: [employer]    Social Needs      Financial resource strain: Not on file      Food insecurity:        Worry: Not on file        Inability: Not on file      Transportation needs:        Medical: Not on file        Non-medical: Not on file    Tobacco Use      Smoking status: Never Smoker      Smokeless tobacco: Never Used    Substance and Sexual Activity      Alcohol use: No      Drug use: No      Sexual activity: Yes        Partners: Female    Lifestyle      Physical activity:        Days per week: Not on file        Minutes per session: Not on file      Stress: Not on file    Relationships      Social connections:        Talks on phone: Not on file        Gets together: Not on file        Attends Yarsani service: Not on file        Active member of club or organization: Not on file        Attends meetings of clubs or organizations: Not on file        Relationship status: Not on file    Other Topics      Concerns:        Patient feels they ought to cut down on drinking/drug use: Not Asked        Patient annoyed by others criticizing  their drinking/drug use: Not Asked        Patient has felt bad or guilty about drinking/drug use: Not Asked        Patient has had a drink/used drugs as an eye opener in the AM: Not Asked    Social History Narrative      ; wife is a RN at [employer]      Current Outpatient Medications:  atorvastatin (LIPITOR) 20 MG tablet, Take 1 tablet (20 mg total) by mouth every evening., Disp: 60 tablet, Rfl: 3  azelastine (ASTELIN) 137 mcg (0.1 %) nasal spray, 1 spray (137 mcg total) by Nasal route once daily., Disp: 30 mL, Rfl: 0  buPROPion (WELLBUTRIN XL) 150 MG TB24 tablet, Take 1 tablet (150 mg total) by mouth once daily., Disp: 90 tablet, Rfl: 3  busPIRone (BUSPAR) 7.5 MG tablet, Take 1 tablet (7.5 mg total) by mouth 3 (three) times daily as needed (anxiety)., Disp: 270 tablet, Rfl: 3  (START ON 8/20/2019) dextroamphetamine-amphetamine 10 mg Tab, Take 1 tablet (10 mg total) by mouth 2 (two) times daily., Disp: 60 tablet, Rfl: 0  dulaglutide (TRULICITY) 1.5 mg/0.5 mL PnIj, Inject 1.5 mg into the skin every 7 days., Disp: 12 Syringe, Rfl: 3  empagliflozin (JARDIANCE) 10 mg Tab, Take 10 mg by mouth once daily., Disp: 60 tablet, Rfl: 3  esomeprazole (NEXIUM) 40 MG capsule, Take 40 mg by mouth before breakfast. , Disp: , Rfl:   etodolac (LODINE) 200 MG Cap, Take 1 capsule (200 mg total) by mouth daily as needed (pain)., Disp: 30 capsule, Rfl: 11  flash glucose scanning reader (FREESTYLE VIRGIL 14 DAY READER) Misc, Use as directed, Disp: 1 each, Rfl: 0  flash glucose sensor (FREESTYLE VIRGIL 14 DAY SENSOR) Kit, Change every 14 days, Disp: 2 kit, Rfl: 6  gabapentin (NEURONTIN) 300 MG capsule, Take 1 capsule (300 mg total) by mouth every evening., Disp: 30 capsule, Rfl: 11  HUMALOG KWIKPEN 200 unit/mL (3 mL) InPn, 12 units with breakfast, 14-16 units with lunch and dinner, Disp: , Rfl:   levocetirizine (XYZAL) 5 MG tablet, Take 1 tablet (5 mg total) by mouth every evening., Disp: 30 tablet, Rfl: 11  metFORMIN (GLUCOPHAGE XR)  500 MG 24 hr tablet, Take 1 tablet (500 mg total) by mouth daily with breakfast., Disp: 90 tablet, Rfl: 3  olmesartan (BENICAR) 20 MG tablet, Take 1 tablet (20 mg total) by mouth once daily., Disp: 60 tablet, Rfl: 3  TRESIBA FLEXTOUCH U-200 200 unit/mL (3 mL) InPn, INJECT 40 UNITS SUBCUTANEOUSLY AT BEDTIME, Disp: 3 mL, Rfl: 3    No current facility-administered medications for this visit.       Review of patient's allergies indicates:  No Known Allergies            Review of Systems   Constitution: Negative for weight gain and weight loss.   Cardiovascular: Negative for chest pain.   Respiratory: Positive for shortness of breath (with back pain).    Musculoskeletal: Positive for back pain. Negative for joint pain and joint swelling.   Gastrointestinal: Negative for abdominal pain, bowel incontinence, nausea and vomiting.   Genitourinary: Negative for bladder incontinence.   Neurological: Negative for numbness and paresthesias.         Objective:        General: Chip is well-developed, well-nourished, appears stated age, in no acute distress, alert and oriented to time, place and person.     General    Vitals reviewed.  Constitutional: He is oriented to person, place, and time. He appears well-developed and well-nourished.   HENT:   Head: Normocephalic and atraumatic.   Pulmonary/Chest: Effort normal.   Neurological: He is alert and oriented to person, place, and time.   Psychiatric: He has a normal mood and affect. His behavior is normal. Judgment and thought content normal.     General Musculoskeletal Exam   Gait: normal     Right Ankle/Foot Exam     Tests   Heel Walk: able to perform  Tiptoe Walk: able to perform    Left Ankle/Foot Exam     Tests   Heel Walk: able to perform  Tiptoe Walk: able to perform      Right Hip Exam     Range of Motion   External rotation: 50 (with groin pain)   Internal rotation: 10 (with groin pain)     Tests   Pain w/ forced internal rotation (JUAN): absent  Left Hip Exam     Tests    Pain w/ forced internal rotation (JUAN): absent      Back (L-Spine & T-Spine) / Neck (C-Spine) Exam     Back (L-Spine & T-Spine) Range of Motion   Extension: 20   Flexion: 80   Lateral bend right: 10   Lateral bend left: 10   Rotation right: 30   Rotation left: 30     Spinal Sensation   Right Side Sensation  C-Spine Level: normal   L-Spine Level: normal  S-Spine Level: normal  Left Side Sensation  C-Spine Level: normal  L-Spine Level: normal  S-Spine Level: normal    Back (L-Spine & T-Spine) Tests   Right Side Tests  Straight leg raise:      Sitting SLR: > 70 degrees      Left Side Tests  Straight leg raise:     Sitting SLR: > 70 degrees          Other He has no scoliosis .  Spinal Kyphosis:  Absent      Muscle Strength   Right Upper Extremity   Biceps: 5/5/5   Deltoid:  5/5  Triceps:  5/5  Wrist extension: 5/5/5   Finger Flexors:  5/5  Left Upper Extremity  Biceps: 5/5/5   Deltoid:  5/5  Triceps:  5/5  Wrist extension: 5/5/5   Finger Flexors:  5/5  Right Lower Extremity   Hip Flexion: 5/5   Quadriceps:  5/5   Anterior tibial:  5/5/5  EHL:  5/5  Left Lower Extremity   Hip Flexion: 5/5   Quadriceps:  5/5   Anterior tibial:  5/5/5   EHL:  5/5    Reflexes     Left Side  Biceps:  2+  Triceps:  2+  Brachioradialis:  2+  Quadriceps:  2+  Achilles:  2+  Left Adair's Sign:  Absent  Babinski Sign:  absent    Right Side   Biceps:  2+  Triceps:  2+  Brachioradialis:  2+  Quadriceps:  2+  Achilles:  2+  Right Adair's Sign:  absent  Babinski Sign:  absent    Vascular Exam     Right Pulses        Carotid:                  2+    Left Pulses        Carotid:                  2+              Assessment:       1. DDD (degenerative disc disease), lumbar    2. Chronic right-sided low back pain with right-sided sciatica           Plan:       Orders Placed This Encounter    IR Epidural Transforaminal Inj 1st Vert Lumbar Uni    IR Epidural Transfor Inj Ea Add Lumb Uni       1.  Right L5 and S1 TF Jewel with IR.  The cost going  through pain management because billed as surgery too much due to insurance plan.  Will check with IR  2  He brought disc and MRI of the lumbar spine reviewed with degenerative changes at L4-5 and L5-S1  3.  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  He is doing better with day to day pain we discussed flares can also improve.  He has long history of pain  4.   Continue Flexeril try half during the day  5.  Gabapentin 300mg po BID, we discussed increasing to 3-4 a day  6.  Etodolac twice a day  7.  RTC 3months        Follow-up: Follow up in about 3 months (around 2/22/2020). If there are any questions prior to this, the patient was instructed to contact the office.

## 2019-11-22 ENCOUNTER — OFFICE VISIT (OUTPATIENT)
Dept: SPINE | Facility: CLINIC | Age: 39
End: 2019-11-22
Attending: PHYSICAL MEDICINE & REHABILITATION
Payer: COMMERCIAL

## 2019-11-22 VITALS
WEIGHT: 241.88 LBS | DIASTOLIC BLOOD PRESSURE: 77 MMHG | BODY MASS INDEX: 32.76 KG/M2 | HEART RATE: 95 BPM | HEIGHT: 72 IN | SYSTOLIC BLOOD PRESSURE: 132 MMHG

## 2019-11-22 DIAGNOSIS — M54.41 CHRONIC RIGHT-SIDED LOW BACK PAIN WITH RIGHT-SIDED SCIATICA: ICD-10-CM

## 2019-11-22 DIAGNOSIS — M51.36 DDD (DEGENERATIVE DISC DISEASE), LUMBAR: Primary | ICD-10-CM

## 2019-11-22 DIAGNOSIS — G89.29 CHRONIC RIGHT-SIDED LOW BACK PAIN WITH RIGHT-SIDED SCIATICA: ICD-10-CM

## 2019-11-22 PROCEDURE — 3075F PR MOST RECENT SYSTOLIC BLOOD PRESS GE 130-139MM HG: ICD-10-PCS | Mod: CPTII,S$GLB,, | Performed by: PHYSICAL MEDICINE & REHABILITATION

## 2019-11-22 PROCEDURE — 3078F DIAST BP <80 MM HG: CPT | Mod: CPTII,S$GLB,, | Performed by: PHYSICAL MEDICINE & REHABILITATION

## 2019-11-22 PROCEDURE — 99214 OFFICE O/P EST MOD 30 MIN: CPT | Mod: S$GLB,,, | Performed by: PHYSICAL MEDICINE & REHABILITATION

## 2019-11-22 PROCEDURE — 3008F BODY MASS INDEX DOCD: CPT | Mod: CPTII,S$GLB,, | Performed by: PHYSICAL MEDICINE & REHABILITATION

## 2019-11-22 PROCEDURE — 99999 PR PBB SHADOW E&M-EST. PATIENT-LVL III: ICD-10-PCS | Mod: PBBFAC,,, | Performed by: PHYSICAL MEDICINE & REHABILITATION

## 2019-11-22 PROCEDURE — 99214 PR OFFICE/OUTPT VISIT, EST, LEVL IV, 30-39 MIN: ICD-10-PCS | Mod: S$GLB,,, | Performed by: PHYSICAL MEDICINE & REHABILITATION

## 2019-11-22 PROCEDURE — 3078F PR MOST RECENT DIASTOLIC BLOOD PRESSURE < 80 MM HG: ICD-10-PCS | Mod: CPTII,S$GLB,, | Performed by: PHYSICAL MEDICINE & REHABILITATION

## 2019-11-22 PROCEDURE — 3008F PR BODY MASS INDEX (BMI) DOCUMENTED: ICD-10-PCS | Mod: CPTII,S$GLB,, | Performed by: PHYSICAL MEDICINE & REHABILITATION

## 2019-11-22 PROCEDURE — 99999 PR PBB SHADOW E&M-EST. PATIENT-LVL III: CPT | Mod: PBBFAC,,, | Performed by: PHYSICAL MEDICINE & REHABILITATION

## 2019-11-22 PROCEDURE — 3075F SYST BP GE 130 - 139MM HG: CPT | Mod: CPTII,S$GLB,, | Performed by: PHYSICAL MEDICINE & REHABILITATION

## 2019-11-25 ENCOUNTER — PATIENT MESSAGE (OUTPATIENT)
Dept: INTERNAL MEDICINE | Facility: CLINIC | Age: 39
End: 2019-11-25

## 2019-11-25 NOTE — PROGRESS NOTES
Subjective:       Patient ID: Chip Arora is a 39 y.o. male.    Chief Complaint: Headache    Patient is a 39 y.o.male who presents today for annual       HA: lots of migraines on right side; temple to ear on right side mostly; sharp pain; they last between 20 min or at times all day. Harder to focus; no double vision. Vision is blurrier. Neck gets sore and tight. No nausea/ vomiting. He is light and sound sensitive.                                                                                                                        ED: saw uro in the past; was given viagra; prefers not to take this med as it causes his heart to race. The viagra was not effective anyway.                                  Cholesterol: (due now)  Eye: dr. Gallego; up to date  -Vaccines: Influenza (done); Tetanus (due in two years); declines pneumonia vaccine for now  -Exercise: yes, walks dog for one hour 2-3 weeks; lots of stretching; push ups; weight lifting  -Diet: diabetic diet; low carb and low fat; eating more just at home        Review of Systems   Constitutional: Negative for appetite change, chills and diaphoresis.   HENT: Negative for congestion, dental problem, drooling, ear discharge, ear pain, hearing loss, mouth sores and tinnitus.    Eyes: Negative for discharge, redness and itching.   Respiratory: Negative for chest tightness, shortness of breath and wheezing.    Cardiovascular: Negative for chest pain, palpitations and leg swelling.   Gastrointestinal: Negative for abdominal pain, constipation, diarrhea, nausea and vomiting.   Endocrine: Negative for cold intolerance and heat intolerance.   Genitourinary: Negative for decreased urine volume, difficulty urinating, flank pain and hematuria.   Musculoskeletal: Negative for arthralgias, gait problem and myalgias.   Skin: Negative for color change and rash.   Allergic/Immunologic: Negative for environmental allergies.   Neurological: Negative for dizziness, syncope and  headaches.   Hematological: Negative for adenopathy.   Psychiatric/Behavioral: Negative for agitation, behavioral problems, confusion and sleep disturbance.       Objective:      Physical Exam   Constitutional: He is oriented to person, place, and time. He appears well-developed and well-nourished. No distress.   HENT:   Head: Normocephalic and atraumatic.   Right Ear: External ear normal.   Left Ear: External ear normal.   Nose: Nose normal.   Mouth/Throat: Oropharynx is clear and moist. No oropharyngeal exudate.   Eyes: Pupils are equal, round, and reactive to light. Conjunctivae and EOM are normal. Right eye exhibits no discharge. Left eye exhibits no discharge. No scleral icterus.   Neck: Normal range of motion. Neck supple. No JVD present. No tracheal deviation present. No thyromegaly present.   Cardiovascular: Normal rate, regular rhythm, normal heart sounds and intact distal pulses. Exam reveals no gallop and no friction rub.   No murmur heard.  Pulmonary/Chest: Effort normal and breath sounds normal. No stridor. No respiratory distress. He has no wheezes. He has no rales. He exhibits no tenderness.   Abdominal: Soft. Bowel sounds are normal. He exhibits no distension. There is no tenderness. There is no rebound.   Musculoskeletal: Normal range of motion. He exhibits no edema or tenderness.   Lymphadenopathy:     He has no cervical adenopathy.   Neurological: He is alert and oriented to person, place, and time. He has normal reflexes. No cranial nerve deficit.   Skin: Skin is warm and dry. No rash noted. He is not diaphoretic. No erythema.   Psychiatric: He has a normal mood and affect. His behavior is normal.   Nursing note and vitals reviewed.      Assessment and Plan:       1. Annual physical exam  - CBC auto differential; Future  - Comprehensive metabolic panel; Future  - Hemoglobin A1c; Future  - Lipid panel; Future  - Microalbumin/creatinine urine ratio; Future  - Vitamin D; Future  - Urinalysis;  Future  - TSH; Future    2. Type 2 diabetes mellitus with microalbuminuria, with long-term current use of insulin    - CBC auto differential; Future  - Comprehensive metabolic panel; Future  - Hemoglobin A1c; Future  - Lipid panel; Future  - Microalbumin/creatinine urine ratio; Future  - Vitamin D; Future  - Urinalysis; Future  - TSH; Future  - (In Office Administered) Pneumococcal Conjugate Vaccine (13 Valent) (IM)    3. Essential hypertension    - CBC auto differential; Future  - Comprehensive metabolic panel; Future  - Hemoglobin A1c; Future  - Lipid panel; Future  - Microalbumin/creatinine urine ratio; Future  - Vitamin D; Future  - Urinalysis; Future  - TSH; Future    4. Mixed dyslipidemia    - CBC auto differential; Future  - Comprehensive metabolic panel; Future  - Hemoglobin A1c; Future  - Lipid panel; Future  - Microalbumin/creatinine urine ratio; Future  - Vitamin D; Future  - Urinalysis; Future  - TSH; Future    5. Nonintractable headache, unspecified chronicity pattern, unspecified headache type  - cluster vs migraine  - Ambulatory Referral to Neurology    6. Erectile dysfunction, unspecified erectile dysfunction type  - pt wants a new provider  - Ambulatory Referral to Urology          No follow-ups on file.

## 2019-11-27 ENCOUNTER — OFFICE VISIT (OUTPATIENT)
Dept: INTERNAL MEDICINE | Facility: CLINIC | Age: 39
End: 2019-11-27
Payer: COMMERCIAL

## 2019-11-27 VITALS
HEIGHT: 74 IN | SYSTOLIC BLOOD PRESSURE: 120 MMHG | TEMPERATURE: 98 F | HEART RATE: 84 BPM | WEIGHT: 235.44 LBS | BODY MASS INDEX: 30.21 KG/M2 | DIASTOLIC BLOOD PRESSURE: 88 MMHG

## 2019-11-27 DIAGNOSIS — Z00.00 ANNUAL PHYSICAL EXAM: Primary | ICD-10-CM

## 2019-11-27 DIAGNOSIS — I10 ESSENTIAL HYPERTENSION: ICD-10-CM

## 2019-11-27 DIAGNOSIS — R80.9 TYPE 2 DIABETES MELLITUS WITH MICROALBUMINURIA, WITH LONG-TERM CURRENT USE OF INSULIN: ICD-10-CM

## 2019-11-27 DIAGNOSIS — E78.2 MIXED DYSLIPIDEMIA: ICD-10-CM

## 2019-11-27 DIAGNOSIS — Z79.4 TYPE 2 DIABETES MELLITUS WITH MICROALBUMINURIA, WITH LONG-TERM CURRENT USE OF INSULIN: ICD-10-CM

## 2019-11-27 DIAGNOSIS — R51.9 NONINTRACTABLE HEADACHE, UNSPECIFIED CHRONICITY PATTERN, UNSPECIFIED HEADACHE TYPE: ICD-10-CM

## 2019-11-27 DIAGNOSIS — E11.29 TYPE 2 DIABETES MELLITUS WITH MICROALBUMINURIA, WITH LONG-TERM CURRENT USE OF INSULIN: ICD-10-CM

## 2019-11-27 DIAGNOSIS — N52.9 ERECTILE DYSFUNCTION, UNSPECIFIED ERECTILE DYSFUNCTION TYPE: ICD-10-CM

## 2019-11-27 PROCEDURE — 3079F DIAST BP 80-89 MM HG: CPT | Mod: CPTII,S$GLB,, | Performed by: INTERNAL MEDICINE

## 2019-11-27 PROCEDURE — 99999 PR PBB SHADOW E&M-EST. PATIENT-LVL IV: ICD-10-PCS | Mod: PBBFAC,,, | Performed by: INTERNAL MEDICINE

## 2019-11-27 PROCEDURE — 99395 PREV VISIT EST AGE 18-39: CPT | Mod: 25,S$GLB,, | Performed by: INTERNAL MEDICINE

## 2019-11-27 PROCEDURE — 90471 IMMUNIZATION ADMIN: CPT | Mod: S$GLB,,, | Performed by: INTERNAL MEDICINE

## 2019-11-27 PROCEDURE — 99999 PR PBB SHADOW E&M-EST. PATIENT-LVL IV: CPT | Mod: PBBFAC,,, | Performed by: INTERNAL MEDICINE

## 2019-11-27 PROCEDURE — 90471 FLU VACCINE (QUAD) GREATER THAN OR EQUAL TO 3YO PRESERVATIVE FREE IM: ICD-10-PCS | Mod: S$GLB,,, | Performed by: INTERNAL MEDICINE

## 2019-11-27 PROCEDURE — 3074F SYST BP LT 130 MM HG: CPT | Mod: CPTII,S$GLB,, | Performed by: INTERNAL MEDICINE

## 2019-11-27 PROCEDURE — 90686 IIV4 VACC NO PRSV 0.5 ML IM: CPT | Mod: S$GLB,,, | Performed by: INTERNAL MEDICINE

## 2019-11-27 PROCEDURE — 90472 IMMUNIZATION ADMIN EACH ADD: CPT | Mod: S$GLB,,, | Performed by: INTERNAL MEDICINE

## 2019-11-27 PROCEDURE — 99395 PR PREVENTIVE VISIT,EST,18-39: ICD-10-PCS | Mod: 25,S$GLB,, | Performed by: INTERNAL MEDICINE

## 2019-11-27 PROCEDURE — 90670 PNEUMOCOCCAL CONJUGATE VACCINE 13-VALENT LESS THAN 5YO & GREATER THAN: ICD-10-PCS | Mod: S$GLB,,, | Performed by: INTERNAL MEDICINE

## 2019-11-27 PROCEDURE — 3079F PR MOST RECENT DIASTOLIC BLOOD PRESSURE 80-89 MM HG: ICD-10-PCS | Mod: CPTII,S$GLB,, | Performed by: INTERNAL MEDICINE

## 2019-11-27 PROCEDURE — 90472 PNEUMOCOCCAL CONJUGATE VACCINE 13-VALENT LESS THAN 5YO & GREATER THAN: ICD-10-PCS | Mod: S$GLB,,, | Performed by: INTERNAL MEDICINE

## 2019-11-27 PROCEDURE — 90686 FLU VACCINE (QUAD) GREATER THAN OR EQUAL TO 3YO PRESERVATIVE FREE IM: ICD-10-PCS | Mod: S$GLB,,, | Performed by: INTERNAL MEDICINE

## 2019-11-27 PROCEDURE — 3074F PR MOST RECENT SYSTOLIC BLOOD PRESSURE < 130 MM HG: ICD-10-PCS | Mod: CPTII,S$GLB,, | Performed by: INTERNAL MEDICINE

## 2019-11-27 PROCEDURE — 90670 PCV13 VACCINE IM: CPT | Mod: S$GLB,,, | Performed by: INTERNAL MEDICINE

## 2019-12-06 ENCOUNTER — PATIENT OUTREACH (OUTPATIENT)
Dept: ADMINISTRATIVE | Facility: OTHER | Age: 39
End: 2019-12-06

## 2019-12-09 ENCOUNTER — OFFICE VISIT (OUTPATIENT)
Dept: UROLOGY | Facility: CLINIC | Age: 39
End: 2019-12-09
Payer: COMMERCIAL

## 2019-12-09 VITALS
DIASTOLIC BLOOD PRESSURE: 87 MMHG | WEIGHT: 235 LBS | HEIGHT: 74 IN | SYSTOLIC BLOOD PRESSURE: 132 MMHG | HEART RATE: 89 BPM | BODY MASS INDEX: 30.16 KG/M2

## 2019-12-09 DIAGNOSIS — E11.29 TYPE 2 DIABETES MELLITUS WITH MICROALBUMINURIA, WITH LONG-TERM CURRENT USE OF INSULIN: ICD-10-CM

## 2019-12-09 DIAGNOSIS — N52.9 ERECTILE DYSFUNCTION, UNSPECIFIED ERECTILE DYSFUNCTION TYPE: Primary | ICD-10-CM

## 2019-12-09 DIAGNOSIS — Z79.4 TYPE 2 DIABETES MELLITUS WITH MICROALBUMINURIA, WITH LONG-TERM CURRENT USE OF INSULIN: ICD-10-CM

## 2019-12-09 DIAGNOSIS — R80.9 TYPE 2 DIABETES MELLITUS WITH MICROALBUMINURIA, WITH LONG-TERM CURRENT USE OF INSULIN: ICD-10-CM

## 2019-12-09 PROCEDURE — 99999 PR PBB SHADOW E&M-EST. PATIENT-LVL IV: CPT | Mod: PBBFAC,,, | Performed by: STUDENT IN AN ORGANIZED HEALTH CARE EDUCATION/TRAINING PROGRAM

## 2019-12-09 PROCEDURE — 3079F DIAST BP 80-89 MM HG: CPT | Mod: CPTII,S$GLB,, | Performed by: STUDENT IN AN ORGANIZED HEALTH CARE EDUCATION/TRAINING PROGRAM

## 2019-12-09 PROCEDURE — 3075F PR MOST RECENT SYSTOLIC BLOOD PRESS GE 130-139MM HG: ICD-10-PCS | Mod: CPTII,S$GLB,, | Performed by: STUDENT IN AN ORGANIZED HEALTH CARE EDUCATION/TRAINING PROGRAM

## 2019-12-09 PROCEDURE — 3075F SYST BP GE 130 - 139MM HG: CPT | Mod: CPTII,S$GLB,, | Performed by: STUDENT IN AN ORGANIZED HEALTH CARE EDUCATION/TRAINING PROGRAM

## 2019-12-09 PROCEDURE — 3008F BODY MASS INDEX DOCD: CPT | Mod: CPTII,S$GLB,, | Performed by: STUDENT IN AN ORGANIZED HEALTH CARE EDUCATION/TRAINING PROGRAM

## 2019-12-09 PROCEDURE — 99214 PR OFFICE/OUTPT VISIT, EST, LEVL IV, 30-39 MIN: ICD-10-PCS | Mod: S$GLB,,, | Performed by: STUDENT IN AN ORGANIZED HEALTH CARE EDUCATION/TRAINING PROGRAM

## 2019-12-09 PROCEDURE — 3079F PR MOST RECENT DIASTOLIC BLOOD PRESSURE 80-89 MM HG: ICD-10-PCS | Mod: CPTII,S$GLB,, | Performed by: STUDENT IN AN ORGANIZED HEALTH CARE EDUCATION/TRAINING PROGRAM

## 2019-12-09 PROCEDURE — 99999 PR PBB SHADOW E&M-EST. PATIENT-LVL IV: ICD-10-PCS | Mod: PBBFAC,,, | Performed by: STUDENT IN AN ORGANIZED HEALTH CARE EDUCATION/TRAINING PROGRAM

## 2019-12-09 PROCEDURE — 3008F PR BODY MASS INDEX (BMI) DOCUMENTED: ICD-10-PCS | Mod: CPTII,S$GLB,, | Performed by: STUDENT IN AN ORGANIZED HEALTH CARE EDUCATION/TRAINING PROGRAM

## 2019-12-09 PROCEDURE — 99214 OFFICE O/P EST MOD 30 MIN: CPT | Mod: S$GLB,,, | Performed by: STUDENT IN AN ORGANIZED HEALTH CARE EDUCATION/TRAINING PROGRAM

## 2019-12-09 RX ORDER — TADALAFIL 20 MG/1
20 TABLET ORAL DAILY PRN
Qty: 5 TABLET | Refills: 11 | Status: SHIPPED | OUTPATIENT
Start: 2019-12-09 | End: 2020-12-08

## 2019-12-09 NOTE — PROGRESS NOTES
Subjective:       Patient ID: Chip Arora is a 39 y.o. male.    Chief Complaint: Erectile Dysfunction  This is a 39 y.o.  male patient that is new to me but not new to the system.  The patient is referred to me by hsi PCP Dr. Yessenia Albert for erectile dysfunction. He was diagnosed with diabetes in 2002. He experienced fairly acute (a period where it occurred quite suddenly) decrease in his erectile quality in early 2018 or end of 2017. No medication changes occurred around that time. He notes at the time time he was experiencing heart palpitations. He underwent a negative EKG and holter monitor workukp.     He has seen Dr. Pruett in 4/2018 who recommended sildenafil. He did try it multiple times, took 5 tablets at a time as recommended (maximum dose) and he did not have any response. No medications have helped him in the past. He will experience some fullness of erections. He is able to penetrate his partner. He is sexually active with his wife, 18 years. The erections do not last long enough.     Lab Results   Component Value Date    CREATININE 0.9 01/09/2019         ---  Past Medical History:   Diagnosis Date    Anxiety     Bulge of lumbar disc without myelopathy     Depression     Diabetes mellitus, type 2     sees endocrine; Dr. Hernandez at [facility]; states due to anthrax inoculation in the     Essential hypertension 1/10/2019    Hx of psychiatric care     Patellofemoral dysfunction     right    Psychiatric problem     Rotator cuff disorder     right    Therapy        Past Surgical History:   Procedure Laterality Date    WISDOM TOOTH EXTRACTION         Family History   Problem Relation Age of Onset    Diabetes Mother         mild    Cancer Maternal Grandmother         breast       Social History     Tobacco Use    Smoking status: Never Smoker    Smokeless tobacco: Never Used   Substance Use Topics    Alcohol use: No     Frequency: Monthly or less     Drinks per session: 1 or 2     Binge  frequency: Never    Drug use: No       Current Outpatient Medications on File Prior to Visit   Medication Sig Dispense Refill    buPROPion (WELLBUTRIN XL) 150 MG TB24 tablet Take 1 tablet (150 mg total) by mouth once daily. 90 tablet 3    cyclobenzaprine (FLEXERIL) 10 MG tablet TAKE ONE TABLET BY MOUTH THREE TIMES DAILY AS NEEDED FOR MUSCLE SPASM  30 tablet 6    dextroamphetamine-amphetamine 10 mg Tab Take 1 tablet (10 mg total) by mouth 2 (two) times daily. 60 tablet 0    dulaglutide (TRULICITY) 1.5 mg/0.5 mL PnIj Inject 1.5 mg into the skin every 7 days. 12 Syringe 3    empagliflozin (JARDIANCE) 10 mg Tab Take 10 mg by mouth once daily. 60 tablet 3    esomeprazole (NEXIUM) 40 MG capsule Take 40 mg by mouth before breakfast.       etodolac (LODINE) 200 MG Cap Take 1 capsule (200 mg total) by mouth daily as needed (pain). 30 capsule 11    flash glucose scanning reader (FREESTYLE VIRGIL 14 DAY READER) Misc Use as directed 1 each 0    flash glucose sensor (FREESTYLE VIRGIL 14 DAY SENSOR) Kit Change every 14 days 2 kit 6    gabapentin (NEURONTIN) 300 MG capsule Take 1 capsule (300 mg total) by mouth 3 (three) times daily. 90 capsule 2    HUMALOG KWIKPEN INSULIN 200 unit/mL (3 mL) InPn Administer 1 unit of insulin for every 8 g of carbohydrates plus low dose sliding scale 45 mL 3    metFORMIN (GLUCOPHAGE XR) 500 MG 24 hr tablet Take 1 tablet (500 mg total) by mouth daily with breakfast. 90 tablet 3    olmesartan (BENICAR) 20 MG tablet Take 1 tablet (20 mg total) by mouth once daily. 60 tablet 3    TRESIBA FLEXTOUCH U-200 200 unit/mL (3 mL) InPn INJECT 40 UNITS SUBCUTANEOUSLY AT BEDTIME 3 mL 3    azelastine (ASTELIN) 137 mcg (0.1 %) nasal spray 1 spray (137 mcg total) by Nasal route once daily. 30 mL 0    levocetirizine (XYZAL) 5 MG tablet Take 1 tablet (5 mg total) by mouth every evening. 30 tablet 11     No current facility-administered medications on file prior to visit.        Review of patient's  allergies indicates:   Allergen Reactions    Contrast media Nausea And Vomiting       Review of Systems   Constitutional: Negative for chills.   HENT: Negative for congestion.    Eyes: Negative for visual disturbance.   Respiratory: Negative for shortness of breath.    Cardiovascular: Negative for chest pain.   Gastrointestinal: Negative for abdominal distention.   Musculoskeletal: Negative for gait problem.   Skin: Negative for color change.   Neurological: Negative for dizziness.   Psychiatric/Behavioral: Negative for agitation.       Objective:      Physical Exam   Constitutional: He appears well-developed and well-nourished.   HENT:   Head: Normocephalic.   Eyes: Pupils are equal, round, and reactive to light.   Neck: Normal range of motion.   Cardiovascular: Intact distal pulses.   Pulmonary/Chest: Effort normal.   Abdominal: Soft.   Musculoskeletal: Normal range of motion.   Neurological: He is alert.   Skin: Skin is warm and dry.   Psychiatric: He has a normal mood and affect.       Assessment:       1. Erectile dysfunction, unspecified erectile dysfunction type    2. Type 2 diabetes mellitus with microalbuminuria, with long-term current use of insulin        Plan:         1. Discussed alternatives to sildenafil - cialis versus vacuum erection device versus Trimix.  2. He would like to price check cialis. Also provided with physical prescriptions for FELICIANO and trimix.  3. If he fills out trimix he understands to come back for a clinic teaching visit on how to inject (sig-Trimix PGE (alprostadil) 10mcg, papavarine 30mg, phentolamine 1mg; dispense 5mL vial, counseled that typical starting is 0.2 mL which is equal to 20 units. )  4. He understands that he should not combine pharmacotherapies.      Erectile dysfunction, unspecified erectile dysfunction type  -     tadalafil (CIALIS) 20 MG Tab; Take 1 tablet (20 mg total) by mouth daily as needed (erectile function).  Dispense: 5 tablet; Refill: 11    Type 2  diabetes mellitus with microalbuminuria, with long-term current use of insulin  -     tadalafil (CIALIS) 20 MG Tab; Take 1 tablet (20 mg total) by mouth daily as needed (erectile function).  Dispense: 5 tablet; Refill: 11

## 2019-12-09 NOTE — LETTER
December 9, 2019      Yessenia Albert, DO  [address] [id number]           [facility]  [address], [address]  [facility] [facility] [address]  Phone: [phone]          Patient: Chip Arora   MR Number: [id number]   YOB: 1980   Date of Visit: 12/9/2019       Dear Dr. Yessenia Albert:    Thank you for referring Chip Arora to me for evaluation. Attached you will find relevant portions of my assessment and plan of care.    If you have questions, please do not hesitate to call me. I look forward to following Chip Arora along with you.    Sincerely,    Roula Hope MD    Enclosure  CC:  No Recipients    If you would like to receive this communication electronically, please contact [email or web address] or [phone] to request more information on [employer] access.    For providers and/or their staff who would like to refer a patient to [facility], please contact us through our one-stop-shop provider referral line, [facility] , at [phone].    If you feel you have received this communication in error or would no longer like to receive these types of communications, please e-mail [email or web address]

## 2019-12-17 ENCOUNTER — PATIENT MESSAGE (OUTPATIENT)
Dept: ENDOCRINOLOGY | Facility: CLINIC | Age: 39
End: 2019-12-17

## 2019-12-17 DIAGNOSIS — E11.9 TYPE 2 DIABETES MELLITUS WITHOUT COMPLICATION, WITH LONG-TERM CURRENT USE OF INSULIN: ICD-10-CM

## 2019-12-17 DIAGNOSIS — Z79.4 TYPE 2 DIABETES MELLITUS WITHOUT COMPLICATION, WITH LONG-TERM CURRENT USE OF INSULIN: ICD-10-CM

## 2019-12-19 ENCOUNTER — PATIENT MESSAGE (OUTPATIENT)
Dept: INTERNAL MEDICINE | Facility: CLINIC | Age: 39
End: 2019-12-19

## 2019-12-19 RX ORDER — LEVOCETIRIZINE DIHYDROCHLORIDE 5 MG/1
5 TABLET, FILM COATED ORAL NIGHTLY
Qty: 30 TABLET | Refills: 11 | Status: SHIPPED | OUTPATIENT
Start: 2019-12-19 | End: 2020-05-13 | Stop reason: SDUPTHER

## 2020-01-02 ENCOUNTER — PATIENT OUTREACH (OUTPATIENT)
Dept: ADMINISTRATIVE | Facility: OTHER | Age: 40
End: 2020-01-02

## 2020-01-08 ENCOUNTER — PATIENT MESSAGE (OUTPATIENT)
Dept: INTERNAL MEDICINE | Facility: CLINIC | Age: 40
End: 2020-01-08

## 2020-01-08 DIAGNOSIS — E11.9 TYPE 2 DIABETES MELLITUS WITHOUT COMPLICATION, WITH LONG-TERM CURRENT USE OF INSULIN: ICD-10-CM

## 2020-01-08 DIAGNOSIS — Z79.4 TYPE 2 DIABETES MELLITUS WITHOUT COMPLICATION, WITH LONG-TERM CURRENT USE OF INSULIN: ICD-10-CM

## 2020-01-08 RX ORDER — OLMESARTAN MEDOXOMIL 20 MG/1
20 TABLET ORAL DAILY
Qty: 90 TABLET | Refills: 3 | Status: SHIPPED | OUTPATIENT
Start: 2020-01-08 | End: 2020-04-15 | Stop reason: SDUPTHER

## 2020-01-27 ENCOUNTER — PATIENT MESSAGE (OUTPATIENT)
Dept: PSYCHIATRY | Facility: CLINIC | Age: 40
End: 2020-01-27

## 2020-01-27 RX ORDER — INSULIN DEGLUDEC 200 U/ML
INJECTION, SOLUTION SUBCUTANEOUS
Qty: 3 ML | Refills: 3 | Status: SHIPPED | OUTPATIENT
Start: 2020-01-27 | End: 2020-10-08

## 2020-01-28 NOTE — PROGRESS NOTES
Subjective:       Patient ID: Chip Arora is a 39 y.o. male.    Chief Complaint: Follow-up (discuss general health)    Patient is a 39 y.o.male who presents today for follow up. Wife present during visit. He is moving to [place] for his job. Feeling good. Admits to recent lack of exercise.     Labs: reviewed  Review of Systems   Constitutional: Negative for appetite change, chills and diaphoresis.   HENT: Negative for congestion, dental problem, drooling, ear discharge, ear pain, hearing loss, mouth sores and tinnitus.    Eyes: Negative for discharge, redness and itching.   Respiratory: Negative for chest tightness, shortness of breath and wheezing.    Cardiovascular: Negative for chest pain, palpitations and leg swelling.   Gastrointestinal: Negative for abdominal pain, constipation, diarrhea, nausea and vomiting.   Endocrine: Negative for cold intolerance and heat intolerance.   Genitourinary: Negative for decreased urine volume, difficulty urinating, flank pain and hematuria.   Musculoskeletal: Negative for arthralgias, gait problem and myalgias.   Skin: Negative for color change and rash.   Allergic/Immunologic: Negative for environmental allergies.   Neurological: Negative for dizziness, syncope and headaches.   Hematological: Negative for adenopathy.   Psychiatric/Behavioral: Negative for agitation, behavioral problems, confusion and sleep disturbance.       Objective:      Physical Exam   Constitutional: He is oriented to person, place, and time. He appears well-developed and well-nourished. No distress.   HENT:   Head: Normocephalic and atraumatic.   Right Ear: External ear normal.   Left Ear: External ear normal.   Nose: Nose normal.   Mouth/Throat: Oropharynx is clear and moist. No oropharyngeal exudate.   Eyes: Pupils are equal, round, and reactive to light. Conjunctivae and EOM are normal. Right eye exhibits no discharge. Left eye exhibits no discharge. No scleral icterus.   Neck: Neck supple. No JVD  present. No tracheal deviation present. No thyromegaly present.   Cardiovascular: Normal rate, regular rhythm, normal heart sounds and intact distal pulses. Exam reveals no gallop and no friction rub.   No murmur heard.  Pulmonary/Chest: Effort normal and breath sounds normal. No stridor. No respiratory distress. He has no wheezes. He has no rales. He exhibits no tenderness.   Abdominal: Soft. Bowel sounds are normal. He exhibits no distension. There is no tenderness. There is no rebound.   Musculoskeletal: He exhibits no edema or tenderness.   Lymphadenopathy:     He has no cervical adenopathy.   Neurological: He is alert and oriented to person, place, and time. He has normal reflexes. No cranial nerve deficit.   Skin: Skin is warm and dry. No rash noted. He is not diaphoretic. No erythema.   Psychiatric: He has a normal mood and affect. His behavior is normal.   Nursing note and vitals reviewed.      Assessment and Plan:       1. Type 2 diabetes mellitus with microalbuminuria, with long-term current use of insulin  - slight increase in a1c; will get stricter with diet and exercise; continue current meds    2. Mixed dyslipidemia  - stable; discussed low fat diet    3. Essential hypertension  - excellent control    Labs reviewed          No follow-ups on file.

## 2020-02-03 ENCOUNTER — LAB VISIT (OUTPATIENT)
Dept: LAB | Facility: HOSPITAL | Age: 40
End: 2020-02-03
Attending: INTERNAL MEDICINE
Payer: COMMERCIAL

## 2020-02-03 DIAGNOSIS — I10 ESSENTIAL HYPERTENSION: ICD-10-CM

## 2020-02-03 DIAGNOSIS — Z00.00 ANNUAL PHYSICAL EXAM: ICD-10-CM

## 2020-02-03 DIAGNOSIS — Z79.4 TYPE 2 DIABETES MELLITUS WITH MICROALBUMINURIA, WITH LONG-TERM CURRENT USE OF INSULIN: ICD-10-CM

## 2020-02-03 DIAGNOSIS — E78.2 MIXED DYSLIPIDEMIA: ICD-10-CM

## 2020-02-03 DIAGNOSIS — E11.29 TYPE 2 DIABETES MELLITUS WITH MICROALBUMINURIA, WITH LONG-TERM CURRENT USE OF INSULIN: ICD-10-CM

## 2020-02-03 DIAGNOSIS — R80.9 TYPE 2 DIABETES MELLITUS WITH MICROALBUMINURIA, WITH LONG-TERM CURRENT USE OF INSULIN: ICD-10-CM

## 2020-02-03 LAB
25(OH)D3+25(OH)D2 SERPL-MCNC: 38 NG/ML (ref 30–96)
BASOPHILS # BLD AUTO: 0.05 K/UL (ref 0–0.2)
BASOPHILS NFR BLD: 0.6 % (ref 0–1.9)
DIFFERENTIAL METHOD: ABNORMAL
EOSINOPHIL # BLD AUTO: 0.3 K/UL (ref 0–0.5)
EOSINOPHIL NFR BLD: 3.6 % (ref 0–8)
ERYTHROCYTE [DISTWIDTH] IN BLOOD BY AUTOMATED COUNT: 13.3 % (ref 11.5–14.5)
ESTIMATED AVG GLUCOSE: 183 MG/DL (ref 68–131)
HBA1C MFR BLD HPLC: 8 % (ref 4–5.6)
HCT VFR BLD AUTO: 45.4 % (ref 40–54)
HGB BLD-MCNC: 14.2 G/DL (ref 14–18)
IMM GRANULOCYTES # BLD AUTO: 0.02 K/UL (ref 0–0.04)
IMM GRANULOCYTES NFR BLD AUTO: 0.2 % (ref 0–0.5)
LYMPHOCYTES # BLD AUTO: 2.5 K/UL (ref 1–4.8)
LYMPHOCYTES NFR BLD: 27.7 % (ref 18–48)
MCH RBC QN AUTO: 27.6 PG (ref 27–31)
MCHC RBC AUTO-ENTMCNC: 31.3 G/DL (ref 32–36)
MCV RBC AUTO: 88 FL (ref 82–98)
MONOCYTES # BLD AUTO: 0.9 K/UL (ref 0.3–1)
MONOCYTES NFR BLD: 10.1 % (ref 4–15)
NEUTROPHILS # BLD AUTO: 5.1 K/UL (ref 1.8–7.7)
NEUTROPHILS NFR BLD: 57.8 % (ref 38–73)
NRBC BLD-RTO: 0 /100 WBC
PLATELET # BLD AUTO: 285 K/UL (ref 150–350)
PMV BLD AUTO: 10.4 FL (ref 9.2–12.9)
RBC # BLD AUTO: 5.15 M/UL (ref 4.6–6.2)
WBC # BLD AUTO: 8.84 K/UL (ref 3.9–12.7)

## 2020-02-03 PROCEDURE — 80061 LIPID PANEL: CPT

## 2020-02-03 PROCEDURE — 85025 COMPLETE CBC W/AUTO DIFF WBC: CPT

## 2020-02-03 PROCEDURE — 84443 ASSAY THYROID STIM HORMONE: CPT

## 2020-02-03 PROCEDURE — 83036 HEMOGLOBIN GLYCOSYLATED A1C: CPT

## 2020-02-03 PROCEDURE — 82306 VITAMIN D 25 HYDROXY: CPT

## 2020-02-03 PROCEDURE — 80053 COMPREHEN METABOLIC PANEL: CPT

## 2020-02-03 PROCEDURE — 36415 COLL VENOUS BLD VENIPUNCTURE: CPT | Mod: PO

## 2020-02-04 ENCOUNTER — OFFICE VISIT (OUTPATIENT)
Dept: INTERNAL MEDICINE | Facility: CLINIC | Age: 40
End: 2020-02-04
Payer: COMMERCIAL

## 2020-02-04 VITALS
TEMPERATURE: 98 F | HEART RATE: 80 BPM | DIASTOLIC BLOOD PRESSURE: 78 MMHG | SYSTOLIC BLOOD PRESSURE: 120 MMHG | WEIGHT: 241.63 LBS | HEIGHT: 74 IN | BODY MASS INDEX: 31.01 KG/M2

## 2020-02-04 DIAGNOSIS — Z79.4 TYPE 2 DIABETES MELLITUS WITH MICROALBUMINURIA, WITH LONG-TERM CURRENT USE OF INSULIN: Primary | ICD-10-CM

## 2020-02-04 DIAGNOSIS — E78.2 MIXED DYSLIPIDEMIA: ICD-10-CM

## 2020-02-04 DIAGNOSIS — R80.9 TYPE 2 DIABETES MELLITUS WITH MICROALBUMINURIA, WITH LONG-TERM CURRENT USE OF INSULIN: Primary | ICD-10-CM

## 2020-02-04 DIAGNOSIS — E11.29 TYPE 2 DIABETES MELLITUS WITH MICROALBUMINURIA, WITH LONG-TERM CURRENT USE OF INSULIN: Primary | ICD-10-CM

## 2020-02-04 DIAGNOSIS — I10 ESSENTIAL HYPERTENSION: ICD-10-CM

## 2020-02-04 LAB
ALBUMIN SERPL BCP-MCNC: 4.2 G/DL (ref 3.5–5.2)
ALP SERPL-CCNC: 109 U/L (ref 55–135)
ALT SERPL W/O P-5'-P-CCNC: 24 U/L (ref 10–44)
ANION GAP SERPL CALC-SCNC: 13 MMOL/L (ref 8–16)
AST SERPL-CCNC: 15 U/L (ref 10–40)
BILIRUB SERPL-MCNC: 0.3 MG/DL (ref 0.1–1)
BUN SERPL-MCNC: 16 MG/DL (ref 6–20)
CALCIUM SERPL-MCNC: 9.8 MG/DL (ref 8.7–10.5)
CHLORIDE SERPL-SCNC: 101 MMOL/L (ref 95–110)
CHOLEST SERPL-MCNC: 173 MG/DL (ref 120–199)
CHOLEST/HDLC SERPL: 6 {RATIO} (ref 2–5)
CO2 SERPL-SCNC: 27 MMOL/L (ref 23–29)
CREAT SERPL-MCNC: 1 MG/DL (ref 0.5–1.4)
EST. GFR  (AFRICAN AMERICAN): >60 ML/MIN/1.73 M^2
EST. GFR  (NON AFRICAN AMERICAN): >60 ML/MIN/1.73 M^2
GLUCOSE SERPL-MCNC: 214 MG/DL (ref 70–110)
HDLC SERPL-MCNC: 29 MG/DL (ref 40–75)
HDLC SERPL: 16.8 % (ref 20–50)
LDLC SERPL CALC-MCNC: 105.8 MG/DL (ref 63–159)
NONHDLC SERPL-MCNC: 144 MG/DL
POTASSIUM SERPL-SCNC: 4.8 MMOL/L (ref 3.5–5.1)
PROT SERPL-MCNC: 7.7 G/DL (ref 6–8.4)
SODIUM SERPL-SCNC: 141 MMOL/L (ref 136–145)
TRIGL SERPL-MCNC: 191 MG/DL (ref 30–150)
TSH SERPL DL<=0.005 MIU/L-ACNC: 2.12 UIU/ML (ref 0.4–4)

## 2020-02-04 PROCEDURE — 99999 PR PBB SHADOW E&M-EST. PATIENT-LVL III: CPT | Mod: PBBFAC,,, | Performed by: INTERNAL MEDICINE

## 2020-02-04 PROCEDURE — 3074F PR MOST RECENT SYSTOLIC BLOOD PRESSURE < 130 MM HG: ICD-10-PCS | Mod: CPTII,S$GLB,, | Performed by: INTERNAL MEDICINE

## 2020-02-04 PROCEDURE — 99999 PR PBB SHADOW E&M-EST. PATIENT-LVL III: ICD-10-PCS | Mod: PBBFAC,,, | Performed by: INTERNAL MEDICINE

## 2020-02-04 PROCEDURE — 3008F BODY MASS INDEX DOCD: CPT | Mod: CPTII,S$GLB,, | Performed by: INTERNAL MEDICINE

## 2020-02-04 PROCEDURE — 99214 OFFICE O/P EST MOD 30 MIN: CPT | Mod: S$GLB,,, | Performed by: INTERNAL MEDICINE

## 2020-02-04 PROCEDURE — 3078F DIAST BP <80 MM HG: CPT | Mod: CPTII,S$GLB,, | Performed by: INTERNAL MEDICINE

## 2020-02-04 PROCEDURE — 3078F PR MOST RECENT DIASTOLIC BLOOD PRESSURE < 80 MM HG: ICD-10-PCS | Mod: CPTII,S$GLB,, | Performed by: INTERNAL MEDICINE

## 2020-02-04 PROCEDURE — 99214 PR OFFICE/OUTPT VISIT, EST, LEVL IV, 30-39 MIN: ICD-10-PCS | Mod: S$GLB,,, | Performed by: INTERNAL MEDICINE

## 2020-02-04 PROCEDURE — 3074F SYST BP LT 130 MM HG: CPT | Mod: CPTII,S$GLB,, | Performed by: INTERNAL MEDICINE

## 2020-02-04 PROCEDURE — 3008F PR BODY MASS INDEX (BMI) DOCUMENTED: ICD-10-PCS | Mod: CPTII,S$GLB,, | Performed by: INTERNAL MEDICINE

## 2020-02-04 RX ORDER — BUSPIRONE HYDROCHLORIDE 7.5 MG/1
TABLET ORAL
COMMUNITY
Start: 2020-01-20 | End: 2020-04-15 | Stop reason: SDUPTHER

## 2020-02-13 ENCOUNTER — PATIENT MESSAGE (OUTPATIENT)
Dept: UROLOGY | Facility: CLINIC | Age: 40
End: 2020-02-13

## 2020-02-17 ENCOUNTER — PATIENT MESSAGE (OUTPATIENT)
Dept: INTERNAL MEDICINE | Facility: CLINIC | Age: 40
End: 2020-02-17

## 2020-02-17 DIAGNOSIS — Z79.4 TYPE 2 DIABETES MELLITUS WITHOUT COMPLICATION, WITH LONG-TERM CURRENT USE OF INSULIN: ICD-10-CM

## 2020-02-17 DIAGNOSIS — E11.9 TYPE 2 DIABETES MELLITUS WITHOUT COMPLICATION, WITH LONG-TERM CURRENT USE OF INSULIN: ICD-10-CM

## 2020-02-18 DIAGNOSIS — E11.9 TYPE 2 DIABETES MELLITUS WITHOUT COMPLICATION, WITH LONG-TERM CURRENT USE OF INSULIN: ICD-10-CM

## 2020-02-18 DIAGNOSIS — Z79.4 TYPE 2 DIABETES MELLITUS WITHOUT COMPLICATION, WITH LONG-TERM CURRENT USE OF INSULIN: ICD-10-CM

## 2020-02-21 DIAGNOSIS — Z79.4 TYPE 2 DIABETES MELLITUS WITHOUT COMPLICATION, WITH LONG-TERM CURRENT USE OF INSULIN: ICD-10-CM

## 2020-02-21 DIAGNOSIS — E11.9 TYPE 2 DIABETES MELLITUS WITHOUT COMPLICATION, WITH LONG-TERM CURRENT USE OF INSULIN: ICD-10-CM

## 2020-03-13 RX ORDER — METFORMIN HYDROCHLORIDE 500 MG/1
TABLET, EXTENDED RELEASE ORAL
Qty: 30 TABLET | Refills: 2 | Status: SHIPPED | OUTPATIENT
Start: 2020-03-13 | End: 2020-04-15 | Stop reason: SDUPTHER

## 2020-04-15 ENCOUNTER — PATIENT MESSAGE (OUTPATIENT)
Dept: INTERNAL MEDICINE | Facility: CLINIC | Age: 40
End: 2020-04-15

## 2020-04-15 DIAGNOSIS — Z79.4 TYPE 2 DIABETES MELLITUS WITHOUT COMPLICATION, WITH LONG-TERM CURRENT USE OF INSULIN: ICD-10-CM

## 2020-04-15 DIAGNOSIS — E11.9 TYPE 2 DIABETES MELLITUS WITHOUT COMPLICATION, WITH LONG-TERM CURRENT USE OF INSULIN: ICD-10-CM

## 2020-04-15 DIAGNOSIS — F32.0 CURRENT MILD EPISODE OF MAJOR DEPRESSIVE DISORDER, UNSPECIFIED WHETHER RECURRENT: ICD-10-CM

## 2020-04-15 RX ORDER — BUSPIRONE HYDROCHLORIDE 7.5 MG/1
7.5 TABLET ORAL 3 TIMES DAILY
Qty: 270 TABLET | Refills: 2 | Status: SHIPPED | OUTPATIENT
Start: 2020-04-15 | End: 2020-05-21 | Stop reason: SDUPTHER

## 2020-04-15 RX ORDER — OLMESARTAN MEDOXOMIL 20 MG/1
20 TABLET ORAL DAILY
Qty: 90 TABLET | Refills: 3 | Status: SHIPPED | OUTPATIENT
Start: 2020-04-15 | End: 2021-09-07

## 2020-04-15 RX ORDER — METFORMIN HYDROCHLORIDE 500 MG/1
TABLET, EXTENDED RELEASE ORAL
Qty: 90 TABLET | Refills: 2 | Status: SHIPPED | OUTPATIENT
Start: 2020-04-15 | End: 2021-01-04

## 2020-04-15 RX ORDER — BUPROPION HYDROCHLORIDE 150 MG/1
150 TABLET ORAL DAILY
Qty: 90 TABLET | Refills: 3 | Status: SHIPPED | OUTPATIENT
Start: 2020-04-15 | End: 2020-05-21 | Stop reason: SDUPTHER

## 2020-04-15 RX ORDER — GABAPENTIN 300 MG/1
300 CAPSULE ORAL 3 TIMES DAILY
Qty: 270 CAPSULE | Refills: 2 | Status: SHIPPED | OUTPATIENT
Start: 2020-04-15 | End: 2021-06-03

## 2020-04-28 ENCOUNTER — NEW REFERRAL (OUTPATIENT)
Dept: URBAN - METROPOLITAN AREA CLINIC 87 | Facility: CLINIC | Age: 40
End: 2020-04-28

## 2020-04-28 DIAGNOSIS — H33.41: ICD-10-CM

## 2020-04-28 DIAGNOSIS — E11.3513: ICD-10-CM

## 2020-04-28 DIAGNOSIS — Z79.4: ICD-10-CM

## 2020-04-28 DIAGNOSIS — H43.11: ICD-10-CM

## 2020-04-28 PROCEDURE — 92134 CPTRZ OPH DX IMG PST SGM RTA: CPT

## 2020-04-28 PROCEDURE — 67028 INJECTION EYE DRUG: CPT

## 2020-04-28 PROCEDURE — 92202 OPSCPY EXTND ON/MAC DRAW: CPT

## 2020-04-28 PROCEDURE — 99204 OFFICE O/P NEW MOD 45 MIN: CPT | Mod: 25

## 2020-04-28 ASSESSMENT — VISUAL ACUITY
OD_CC: 20/40
OS_CC: 20/20

## 2020-04-28 ASSESSMENT — TONOMETRY
OS_IOP_MMHG: 21
OD_IOP_MMHG: 21

## 2020-05-08 ENCOUNTER — PATIENT MESSAGE (OUTPATIENT)
Dept: INTERNAL MEDICINE | Facility: CLINIC | Age: 40
End: 2020-05-08

## 2020-05-13 ENCOUNTER — PATIENT MESSAGE (OUTPATIENT)
Dept: INTERNAL MEDICINE | Facility: CLINIC | Age: 40
End: 2020-05-13

## 2020-05-13 DIAGNOSIS — Z79.4 TYPE 2 DIABETES MELLITUS WITHOUT COMPLICATION, WITH LONG-TERM CURRENT USE OF INSULIN: ICD-10-CM

## 2020-05-13 DIAGNOSIS — E11.9 TYPE 2 DIABETES MELLITUS WITHOUT COMPLICATION, WITH LONG-TERM CURRENT USE OF INSULIN: ICD-10-CM

## 2020-05-13 RX ORDER — LEVOCETIRIZINE DIHYDROCHLORIDE 5 MG/1
5 TABLET, FILM COATED ORAL NIGHTLY
Qty: 30 TABLET | Refills: 6 | Status: SHIPPED | OUTPATIENT
Start: 2020-05-13 | End: 2020-10-21

## 2020-05-21 ENCOUNTER — OFFICE VISIT (OUTPATIENT)
Dept: PSYCHIATRY | Facility: CLINIC | Age: 40
End: 2020-05-21
Payer: COMMERCIAL

## 2020-05-21 DIAGNOSIS — F32.0 CURRENT MILD EPISODE OF MAJOR DEPRESSIVE DISORDER, UNSPECIFIED WHETHER RECURRENT: ICD-10-CM

## 2020-05-21 DIAGNOSIS — F90.0 ATTENTION DEFICIT HYPERACTIVITY DISORDER (ADHD), PREDOMINANTLY INATTENTIVE TYPE: Primary | ICD-10-CM

## 2020-05-21 DIAGNOSIS — F41.8 SITUATIONAL ANXIETY: ICD-10-CM

## 2020-05-21 PROCEDURE — 99213 PR OFFICE/OUTPT VISIT, EST, LEVL III, 20-29 MIN: ICD-10-PCS | Mod: 95,,, | Performed by: NURSE PRACTITIONER

## 2020-05-21 PROCEDURE — 99213 OFFICE O/P EST LOW 20 MIN: CPT | Mod: 95,,, | Performed by: NURSE PRACTITIONER

## 2020-05-21 RX ORDER — DEXTROAMPHETAMINE SACCHARATE, AMPHETAMINE ASPARTATE MONOHYDRATE, DEXTROAMPHETAMINE SULFATE AND AMPHETAMINE SULFATE 5; 5; 5; 5 MG/1; MG/1; MG/1; MG/1
20 CAPSULE, EXTENDED RELEASE ORAL EVERY MORNING
Qty: 90 CAPSULE | Refills: 0 | Status: SHIPPED | OUTPATIENT
Start: 2020-05-21 | End: 2020-07-08 | Stop reason: SDUPTHER

## 2020-05-21 RX ORDER — BUSPIRONE HYDROCHLORIDE 7.5 MG/1
7.5 TABLET ORAL 3 TIMES DAILY
Qty: 270 TABLET | Refills: 2 | Status: SHIPPED | OUTPATIENT
Start: 2020-05-21 | End: 2020-09-28 | Stop reason: SDUPTHER

## 2020-05-21 RX ORDER — BUPROPION HYDROCHLORIDE 150 MG/1
150 TABLET ORAL DAILY
Qty: 90 TABLET | Refills: 3 | Status: SHIPPED | OUTPATIENT
Start: 2020-05-21 | End: 2020-09-28

## 2020-05-21 NOTE — PROGRESS NOTES
Outpatient Psychiatry Follow-Up Visit (MD/NP)    5/21/2020    Clinical Status of Patient:  Outpatient (Ambulatory)    Chief Complaint:  Chip Arora is a 40 y.o. male who presents today for follow-up of anxiety and attention problems.  Met with patient.      Last visit was: 6/20/19. Chart and  reviewed.   The patient location is: home  The chief complaint leading to consultation is: anxiety and attention problems    Visit type: audiovisual    Face to Face time with patient: 19 minutes  25 minutes of total time spent on the encounter, which includes face to face time and non-face to face time preparing to see the patient (eg, review of tests), Obtaining and/or reviewing separately obtained history, Documenting clinical information in the electronic or other health record, Independently interpreting results (not separately reported) and communicating results to the patient/family/caregiver, or Care coordination (not separately reported).     Each patient to whom he or she provides medical services by telemedicine is:  (1) informed of the relationship between the physician and patient and the respective role of any other health care provider with respect to management of the patient; and (2) notified that he or she may decline to receive medical services by telemedicine and may withdraw from such care at any time.    Interval History and Content of Current Session:  Current Psychiatric Medications/changes  · Start Wellbutrin  mg po daily.  May increase to 300 mg next visit.  · Continue Adderall 10 mg po BID ( 2 month Rx printed)  · Continue Buspar 7.5 mg po TID     Virtual Visit:  Complains of short duration of instant-release Adderall.  Will try XR version.  Pt has been working from home and staying busy during COVID.  Reports anxiety and mood is well managed.  Denies side effects to meds. Denies SI/HI/AVH.     Psychotherapy:  · Target symptoms: depression, lack of focus  · Why chosen therapy is appropriate  versus another modality: relevant to diagnosis  · Outcome monitoring methods: self-report  · Therapeutic intervention type: insight oriented psychotherapy  · Topics discussed/themes: building skills sets for symptom management, symptom recognition  · The patient's response to the intervention is accepting. The patient's progress toward treatment goals is good.   · Duration of intervention: 10 minutes.    Review of Systems   · PSYCHIATRIC: Pertinant items are noted in the narrative.  · CONSTITUTIONAL: No weight gain or loss.   · MUSCULOSKELETAL: No pain or stiffness of the joints.  · NEUROLOGIC: No weakness, sensory changes, seizures, confusion, memory loss, tremor or other abnormal movements.  · ENDOCRINE: No polydipsia or polyuria.  · INTEGUMENTARY: No rashes or lacerations.  · EYES: No exophthalmos, jaundice or blindness.  · ENT: No dizziness, tinnitus or hearing loss.  · RESPIRATORY: No shortness of breath.  · CARDIOVASCULAR: No tachycardia or chest pain.  · GASTROINTESTINAL: No nausea, vomiting, pain, constipation or diarrhea.  · GENITOURINARY: No frequency, dysuria or sexual dysfunction.  · HEMATOLOGIC/LYMPHATIC: No excessive bleeding, prolonged or excessive bleeding after dental extraction/injury.    Past Medical, Family and Social History: The patient's past medical, family and social history have been reviewed and updated as appropriate within the electronic medical record - see encounter notes.    Compliance: yes    Side effects: None    Risk Parameters:  Patient reports no suicidal ideation  Patient reports no homicidal ideation  Patient reports no self-injurious behavior  Patient reports no violent behavior    Exam (detailed: at least 9 elements; comprehensive: all 15 elements)   Constitutional  Vitals:  Most recent vital signs, dated greater than 90 days prior to this appointment, were reviewed.   There were no vitals filed for this visit.     General:  unremarkable, age appropriate      Musculoskeletal  Muscle Strength/Tone:  no tremor, no tic   Gait & Station:  non-ataxic     Psychiatric  Speech:  no latency; no press   Mood & Affect:  euthymic  congruent and appropriate   Thought Process:  normal and logical   Associations:  intact   Thought Content:  normal, no suicidality, no homicidality, delusions, or paranoia   Insight:  intact   Judgement: behavior is adequate to circumstances   Orientation:  grossly intact   Memory: intact for content of interview   Language: grossly intact   Attention Span & Concentration:  able to focus   Fund of Knowledge:  intact and appropriate to age and level of education     Assessment and Diagnosis   Status/Progress: Based on the examination today, the patient's problem(s) is/are adequately but not ideally controlled.  New problems have been presented today (depression).   Co-morbidities and Lack of compliance are not complicating management of the primary condition.  There are no active rule-out diagnoses for this patient at this time.     General Impression:       ICD-10-CM ICD-9-CM   1. Attention deficit hyperactivity disorder (ADHD), predominantly inattentive type F90.0 314.00   2. Current mild episode of major depressive disorder, unspecified whether recurrent F32.0 296.21   3. Situational anxiety F41.8 300.09       Intervention/Counseling/Treatment Plan   · Medication Management: The risks and benefits of medication were discussed with the patient.   · Continue Wellbutrin  mg po daily.  .  · Change to Adderall XR 20 mg po daily  · Continue Buspar 7.5 mg po TID     Return to Clinic: 6 months    Risks, benefits, side effects and alternative treatments discussed with patient. Patient agrees with the current plan as documented.  Encouraged Patient to keep future appointments.  Take medications as prescribed and abstain from substance abuse.  Pt to present to ED for thoughts to harm himself or others

## 2020-05-26 ENCOUNTER — FOLLOW UP (OUTPATIENT)
Dept: URBAN - METROPOLITAN AREA CLINIC 87 | Facility: CLINIC | Age: 40
End: 2020-05-26

## 2020-05-26 DIAGNOSIS — Z79.4: ICD-10-CM

## 2020-05-26 DIAGNOSIS — H33.41: ICD-10-CM

## 2020-05-26 DIAGNOSIS — E11.3513: ICD-10-CM

## 2020-05-26 DIAGNOSIS — H43.11: ICD-10-CM

## 2020-05-26 PROCEDURE — 92202 OPSCPY EXTND ON/MAC DRAW: CPT

## 2020-05-26 PROCEDURE — 67028 INJECTION EYE DRUG: CPT

## 2020-05-26 PROCEDURE — 92014 COMPRE OPH EXAM EST PT 1/>: CPT | Mod: 25

## 2020-05-26 PROCEDURE — 92134 CPTRZ OPH DX IMG PST SGM RTA: CPT

## 2020-05-26 ASSESSMENT — VISUAL ACUITY
OD_CC: 20/50
OS_CC: 20/25

## 2020-05-26 ASSESSMENT — TONOMETRY
OD_IOP_MMHG: 22
OS_IOP_MMHG: 22

## 2020-06-12 DIAGNOSIS — E11.9 TYPE 2 DIABETES MELLITUS WITHOUT COMPLICATION: ICD-10-CM

## 2020-07-07 ENCOUNTER — FOLLOW UP (OUTPATIENT)
Dept: URBAN - METROPOLITAN AREA CLINIC 87 | Facility: CLINIC | Age: 40
End: 2020-07-07

## 2020-07-07 DIAGNOSIS — H43.11: ICD-10-CM

## 2020-07-07 DIAGNOSIS — E11.3513: ICD-10-CM

## 2020-07-07 DIAGNOSIS — H33.41: ICD-10-CM

## 2020-07-07 DIAGNOSIS — Z79.4: ICD-10-CM

## 2020-07-07 PROCEDURE — 67028 INJECTION EYE DRUG: CPT

## 2020-07-07 PROCEDURE — 92134 CPTRZ OPH DX IMG PST SGM RTA: CPT

## 2020-07-07 PROCEDURE — 92202 OPSCPY EXTND ON/MAC DRAW: CPT

## 2020-07-07 PROCEDURE — 92014 COMPRE OPH EXAM EST PT 1/>: CPT | Mod: 25

## 2020-07-07 ASSESSMENT — VISUAL ACUITY
OD_CC: 20/50-2
OS_CC: 20/25

## 2020-07-07 ASSESSMENT — TONOMETRY
OD_IOP_MMHG: 20
OS_IOP_MMHG: 21

## 2020-08-10 DIAGNOSIS — F90.0 ATTENTION DEFICIT HYPERACTIVITY DISORDER (ADHD), PREDOMINANTLY INATTENTIVE TYPE: ICD-10-CM

## 2020-08-10 RX ORDER — DEXTROAMPHETAMINE SACCHARATE, AMPHETAMINE ASPARTATE MONOHYDRATE, DEXTROAMPHETAMINE SULFATE AND AMPHETAMINE SULFATE 5; 5; 5; 5 MG/1; MG/1; MG/1; MG/1
20 CAPSULE, EXTENDED RELEASE ORAL EVERY MORNING
Qty: 30 CAPSULE | Refills: 0 | Status: SHIPPED | OUTPATIENT
Start: 2020-08-10 | End: 2020-09-28 | Stop reason: SDUPTHER

## 2020-08-25 ENCOUNTER — FOLLOW UP (OUTPATIENT)
Dept: URBAN - METROPOLITAN AREA CLINIC 87 | Facility: CLINIC | Age: 40
End: 2020-08-25

## 2020-08-25 VITALS — HEIGHT: 60 IN

## 2020-08-25 DIAGNOSIS — H33.41: ICD-10-CM

## 2020-08-25 DIAGNOSIS — E11.3513: ICD-10-CM

## 2020-08-25 DIAGNOSIS — Z79.4: ICD-10-CM

## 2020-08-25 DIAGNOSIS — H43.11: ICD-10-CM

## 2020-08-25 PROCEDURE — 92202 OPSCPY EXTND ON/MAC DRAW: CPT | Mod: 59

## 2020-08-25 PROCEDURE — 67028 INJECTION EYE DRUG: CPT

## 2020-08-25 PROCEDURE — 92134 CPTRZ OPH DX IMG PST SGM RTA: CPT

## 2020-08-25 PROCEDURE — 92014 COMPRE OPH EXAM EST PT 1/>: CPT | Mod: 25

## 2020-08-25 ASSESSMENT — VISUAL ACUITY
OS_CC: 20/30
OD_CC: 20/40-2

## 2020-08-25 ASSESSMENT — TONOMETRY
OD_IOP_MMHG: 22
OS_IOP_MMHG: 19

## 2020-09-22 ENCOUNTER — PATIENT MESSAGE (OUTPATIENT)
Dept: ENDOCRINOLOGY | Facility: CLINIC | Age: 40
End: 2020-09-22

## 2020-09-22 ENCOUNTER — PATIENT MESSAGE (OUTPATIENT)
Dept: PSYCHIATRY | Facility: CLINIC | Age: 40
End: 2020-09-22

## 2020-09-22 DIAGNOSIS — Z79.4 TYPE 2 DIABETES MELLITUS WITHOUT COMPLICATION, WITH LONG-TERM CURRENT USE OF INSULIN: ICD-10-CM

## 2020-09-22 DIAGNOSIS — E11.9 TYPE 2 DIABETES MELLITUS WITHOUT COMPLICATION, WITH LONG-TERM CURRENT USE OF INSULIN: ICD-10-CM

## 2020-09-22 RX ORDER — DULAGLUTIDE 1.5 MG/.5ML
1.5 INJECTION, SOLUTION SUBCUTANEOUS
Qty: 12 ML | Refills: 0 | Status: SHIPPED | OUTPATIENT
Start: 2020-09-22 | End: 2021-04-16

## 2020-09-22 RX ORDER — EMPAGLIFLOZIN 10 MG/1
TABLET, FILM COATED ORAL
Qty: 30 TABLET | Refills: 0 | Status: SHIPPED | OUTPATIENT
Start: 2020-09-22 | End: 2020-11-16 | Stop reason: SDUPTHER

## 2020-09-28 ENCOUNTER — OFFICE VISIT (OUTPATIENT)
Dept: PSYCHIATRY | Facility: CLINIC | Age: 40
End: 2020-09-28
Payer: COMMERCIAL

## 2020-09-28 DIAGNOSIS — F32.0 CURRENT MILD EPISODE OF MAJOR DEPRESSIVE DISORDER, UNSPECIFIED WHETHER RECURRENT: ICD-10-CM

## 2020-09-28 DIAGNOSIS — F90.0 ATTENTION DEFICIT HYPERACTIVITY DISORDER (ADHD), PREDOMINANTLY INATTENTIVE TYPE: ICD-10-CM

## 2020-09-28 PROCEDURE — 99213 PR OFFICE/OUTPT VISIT, EST, LEVL III, 20-29 MIN: ICD-10-PCS | Mod: 95,,, | Performed by: NURSE PRACTITIONER

## 2020-09-28 PROCEDURE — 99213 OFFICE O/P EST LOW 20 MIN: CPT | Mod: 95,,, | Performed by: NURSE PRACTITIONER

## 2020-09-28 RX ORDER — BUPROPION HYDROCHLORIDE 300 MG/1
300 TABLET ORAL DAILY
Qty: 90 TABLET | Refills: 3 | Status: SHIPPED | OUTPATIENT
Start: 2020-09-28

## 2020-09-28 RX ORDER — DEXTROAMPHETAMINE SACCHARATE, AMPHETAMINE ASPARTATE MONOHYDRATE, DEXTROAMPHETAMINE SULFATE AND AMPHETAMINE SULFATE 5; 5; 5; 5 MG/1; MG/1; MG/1; MG/1
20 CAPSULE, EXTENDED RELEASE ORAL EVERY MORNING
Qty: 30 CAPSULE | Refills: 0 | Status: SHIPPED | OUTPATIENT
Start: 2020-09-28 | End: 2020-10-23 | Stop reason: SDUPTHER

## 2020-09-28 RX ORDER — BUSPIRONE HYDROCHLORIDE 7.5 MG/1
7.5 TABLET ORAL 3 TIMES DAILY
Qty: 270 TABLET | Refills: 2 | Status: SHIPPED | OUTPATIENT
Start: 2020-09-28

## 2020-09-30 ENCOUNTER — TELEPHONE (OUTPATIENT)
Dept: INTERNAL MEDICINE | Facility: CLINIC | Age: 40
End: 2020-09-30

## 2020-10-05 ENCOUNTER — PATIENT MESSAGE (OUTPATIENT)
Dept: ADMINISTRATIVE | Facility: HOSPITAL | Age: 40
End: 2020-10-05

## 2020-10-08 ENCOUNTER — PATIENT MESSAGE (OUTPATIENT)
Dept: INTERNAL MEDICINE | Facility: CLINIC | Age: 40
End: 2020-10-08

## 2020-10-08 ENCOUNTER — OFFICE VISIT (OUTPATIENT)
Dept: INTERNAL MEDICINE | Facility: CLINIC | Age: 40
End: 2020-10-08
Payer: COMMERCIAL

## 2020-10-08 ENCOUNTER — TELEPHONE (OUTPATIENT)
Dept: INTERNAL MEDICINE | Facility: CLINIC | Age: 40
End: 2020-10-08

## 2020-10-08 DIAGNOSIS — Z79.4 TYPE 2 DIABETES MELLITUS WITHOUT COMPLICATION, WITH LONG-TERM CURRENT USE OF INSULIN: Primary | ICD-10-CM

## 2020-10-08 DIAGNOSIS — E78.2 MIXED DYSLIPIDEMIA: ICD-10-CM

## 2020-10-08 DIAGNOSIS — E11.9 TYPE 2 DIABETES MELLITUS WITHOUT COMPLICATION, WITH LONG-TERM CURRENT USE OF INSULIN: Primary | ICD-10-CM

## 2020-10-08 DIAGNOSIS — I10 ESSENTIAL HYPERTENSION: ICD-10-CM

## 2020-10-08 DIAGNOSIS — F32.0 CURRENT MILD EPISODE OF MAJOR DEPRESSIVE DISORDER, UNSPECIFIED WHETHER RECURRENT: ICD-10-CM

## 2020-10-08 PROCEDURE — 99213 PR OFFICE/OUTPT VISIT, EST, LEVL III, 20-29 MIN: ICD-10-PCS | Mod: 95,,, | Performed by: INTERNAL MEDICINE

## 2020-10-08 PROCEDURE — 99213 OFFICE O/P EST LOW 20 MIN: CPT | Mod: 95,,, | Performed by: INTERNAL MEDICINE

## 2020-10-08 PROCEDURE — 3052F HG A1C>EQUAL 8.0%<EQUAL 9.0%: CPT | Mod: CPTII,95,, | Performed by: INTERNAL MEDICINE

## 2020-10-08 PROCEDURE — 3052F PR MOST RECENT HEMOGLOBIN A1C LEVEL 8.0 - < 9.0%: ICD-10-PCS | Mod: CPTII,95,, | Performed by: INTERNAL MEDICINE

## 2020-10-08 NOTE — TELEPHONE ENCOUNTER
----- Message from Allan Lee sent at 10/8/2020  1:45 PM CDT -----  Regarding: Appt  Contact: Patient [phone]  Patient would like to get medical advice.    Comments: Patient calling stating not able to get into the Virtual appt for 1:45pm today, keep logging patient, would like to have phone call appt instead, call to inform.    Call Patient [phone]    Please call an advise  Thank you

## 2020-10-08 NOTE — PROGRESS NOTES
Subjective:       Patient ID: Chip Arora is a 40 y.o. male.    Chief Complaint: No chief complaint on file.    Patient is a 40 y.o.male who presents today for follow up.  Established Patient - Audio Only Telehealth Visit     The patient location is: [place]  The chief complaint leading to consultation is: follow up  Visit type: Virtual visit with audio only (telephone)  Total time spent with patient: 20 min       The reason for the audio only service rather than synchronous audio and video virtual visit was related to technical difficulties or patient preference/necessity.     Each patient to whom I provide medical services by telemedicine is:  (1) informed of the relationship between the physician and patient and the respective role of any other health care provider with respect to management of the patient; and (2) notified that they may decline to receive medical services by telemedicine and may withdraw from such care at any time. Patient verbally consented to receive this service via voice-only telephone call.       HPI:     DM: really well managed recently. Couple of instances when he would have hypoglycemia.    Taking tresiba and backed off to 30 units. Has endo appt coming up.     Had to go to an ortho for encapsulated shoulder injury. He is doing PT for this. He had a steroid injection which spiked him for a day or so.           This service was not originating from a related E/M service provided within the previous 7 days nor will  to an E/M service or procedure within the next 24 hours or my soonest available appointment.  Prevailing standard of care was able to be met in this audio-only visit.          Review of Systems   Constitutional: Negative for appetite change, chills and diaphoresis.   HENT: Negative for congestion, dental problem, drooling, ear discharge, ear pain, hearing loss, mouth sores and tinnitus.    Eyes: Negative for discharge, redness and itching.   Respiratory: Negative for chest  tightness, shortness of breath and wheezing.    Cardiovascular: Negative for chest pain, palpitations and leg swelling.   Gastrointestinal: Negative for abdominal pain, constipation, diarrhea, nausea and vomiting.   Endocrine: Negative for cold intolerance and heat intolerance.   Genitourinary: Negative for decreased urine volume, difficulty urinating, flank pain and hematuria.   Musculoskeletal: Negative for arthralgias, gait problem and myalgias.   Skin: Negative for color change and rash.   Allergic/Immunologic: Negative for environmental allergies.   Neurological: Negative for dizziness, syncope and headaches.   Hematological: Negative for adenopathy.   Psychiatric/Behavioral: Negative for agitation, behavioral problems, confusion and sleep disturbance.       Objective:      Physical Exam  Constitutional:       General: He is not in acute distress.     Appearance: He is well-developed. He is not diaphoretic.   HENT:      Head: Normocephalic and atraumatic.      Right Ear: External ear normal.      Left Ear: External ear normal.   Eyes:      General: No scleral icterus.        Right eye: No discharge.         Left eye: No discharge.      Conjunctiva/sclera: Conjunctivae normal.      Pupils: Pupils are equal, round, and reactive to light.   Neck:      Musculoskeletal: Normal range of motion and neck supple.   Pulmonary:      Effort: Pulmonary effort is normal.      Breath sounds: No stridor.   Neurological:      Mental Status: He is alert and oriented to person, place, and time.   Psychiatric:         Behavior: Behavior normal.         Thought Content: Thought content normal.         Judgment: Judgment normal.         Assessment and Plan:       1. Type 2 diabetes mellitus without complication, with long-term current use of insulin  - Hemoglobin A1C; Future  - Comprehensive Metabolic Panel; Future  - Lipid Panel; Future  - Hemoglobin A1C  - Comprehensive Metabolic Panel  - Lipid Panel    2. Current mild episode of  major depressive disorder, unspecified whether recurrent      3. Essential hypertension      4. Mixed dyslipidemia      Will do labs in [place]. Continue current meds for now. May need to decrease tresiba more if he still gets low readings.           No follow-ups on file.

## 2020-10-09 ENCOUNTER — TELEPHONE (OUTPATIENT)
Dept: ENDOCRINOLOGY | Facility: CLINIC | Age: 40
End: 2020-10-09

## 2020-10-09 NOTE — PROGRESS NOTES
NO LOS. Patient unable to connect to [employer]. [employer] link sent and patient was called. Reports blank white screen with [employer] link. We will order labs and reschedule appointment.

## 2020-10-11 ENCOUNTER — PATIENT OUTREACH (OUTPATIENT)
Dept: ADMINISTRATIVE | Facility: OTHER | Age: 40
End: 2020-10-11

## 2020-10-11 NOTE — LETTER
AUTHORIZATION FOR RELEASE OF   CONFIDENTIAL INFORMATION    Dear RUDY Campa MD,    We are seeing Chip Arora, date of birth 1980, in the clinic at [facility]. Yessenia Albert DO is the patient's PCP. Chip Arora has an outstanding lab/procedure at the time we reviewed his chart. In order to help keep his health information updated, he has authorized us to request the following medical record(s):        (  )  MAMMOGRAM                                      (  )  COLONOSCOPY      (  )  PAP SMEAR                                          (  )  OUTSIDE LAB RESULTS     (  )  DEXA SCAN                                          ( x )  EYE EXAM            (  )  FOOT EXAM                                          (  )  ENTIRE RECORD     (  )  OUTSIDE IMMUNIZATIONS                 (  )  _______________         Please fax records to [facility], Angele S Lafleur, DO, [phone]     If you have any questions, please contact Cyndie Hudson at [phone].           Patient Name: Chip Arora  : 1980  Patient Phone #: [phone]

## 2020-10-11 NOTE — PROGRESS NOTES
Care Everywhere: updated  Immunization: updated, no profile in links   Health Maintenance: updated  Media Review: review for outside eye exam report   Legacy Review:   Order placed:   Upcoming appts:  efax sent to Dr. Campa office to obtain eye exam report

## 2020-10-12 ENCOUNTER — OFFICE VISIT (OUTPATIENT)
Dept: ENDOCRINOLOGY | Facility: CLINIC | Age: 40
End: 2020-10-12
Payer: COMMERCIAL

## 2020-10-12 DIAGNOSIS — E11.9 TYPE 2 DIABETES MELLITUS WITHOUT COMPLICATION, WITH LONG-TERM CURRENT USE OF INSULIN: Primary | ICD-10-CM

## 2020-10-12 DIAGNOSIS — R80.9 TYPE 2 DIABETES MELLITUS WITH MICROALBUMINURIA, WITH LONG-TERM CURRENT USE OF INSULIN: ICD-10-CM

## 2020-10-12 DIAGNOSIS — E11.29 TYPE 2 DIABETES MELLITUS WITH MICROALBUMINURIA, WITH LONG-TERM CURRENT USE OF INSULIN: ICD-10-CM

## 2020-10-12 DIAGNOSIS — I10 ESSENTIAL HYPERTENSION: ICD-10-CM

## 2020-10-12 DIAGNOSIS — E78.2 MIXED DYSLIPIDEMIA: ICD-10-CM

## 2020-10-12 DIAGNOSIS — Z79.4 TYPE 2 DIABETES MELLITUS WITHOUT COMPLICATION, WITH LONG-TERM CURRENT USE OF INSULIN: Primary | ICD-10-CM

## 2020-10-12 DIAGNOSIS — Z79.4 TYPE 2 DIABETES MELLITUS WITH MICROALBUMINURIA, WITH LONG-TERM CURRENT USE OF INSULIN: ICD-10-CM

## 2020-10-12 PROCEDURE — 99499 NO LOS: ICD-10-PCS | Mod: 95,,, | Performed by: NURSE PRACTITIONER

## 2020-10-12 PROCEDURE — 99499 UNLISTED E&M SERVICE: CPT | Mod: 95,,, | Performed by: NURSE PRACTITIONER

## 2020-10-20 DIAGNOSIS — E11.9 TYPE 2 DIABETES MELLITUS WITHOUT COMPLICATION, WITH LONG-TERM CURRENT USE OF INSULIN: ICD-10-CM

## 2020-10-20 DIAGNOSIS — Z79.4 TYPE 2 DIABETES MELLITUS WITHOUT COMPLICATION, WITH LONG-TERM CURRENT USE OF INSULIN: ICD-10-CM

## 2020-10-21 RX ORDER — LEVOCETIRIZINE DIHYDROCHLORIDE 5 MG/1
TABLET, FILM COATED ORAL
Qty: 90 TABLET | Refills: 0 | Status: SHIPPED | OUTPATIENT
Start: 2020-10-21 | End: 2021-03-04

## 2020-10-21 RX ORDER — FLASH GLUCOSE SENSOR
KIT MISCELLANEOUS
Qty: 6 KIT | Refills: 0 | Status: SHIPPED | OUTPATIENT
Start: 2020-10-21 | End: 2021-03-04

## 2020-10-22 ENCOUNTER — FOLLOW UP (OUTPATIENT)
Dept: URBAN - METROPOLITAN AREA CLINIC 87 | Facility: CLINIC | Age: 40
End: 2020-10-22

## 2020-10-22 DIAGNOSIS — Z79.4: ICD-10-CM

## 2020-10-22 DIAGNOSIS — H43.11: ICD-10-CM

## 2020-10-22 DIAGNOSIS — H33.41: ICD-10-CM

## 2020-10-22 DIAGNOSIS — E11.3513: ICD-10-CM

## 2020-10-22 LAB
LEFT EYE DM RETINOPATHY: POSITIVE
RIGHT EYE DM RETINOPATHY: POSITIVE

## 2020-10-22 PROCEDURE — 92134 CPTRZ OPH DX IMG PST SGM RTA: CPT

## 2020-10-22 PROCEDURE — 67028 INJECTION EYE DRUG: CPT

## 2020-10-22 PROCEDURE — 92014 COMPRE OPH EXAM EST PT 1/>: CPT | Mod: 25

## 2020-10-22 PROCEDURE — 92202 OPSCPY EXTND ON/MAC DRAW: CPT

## 2020-10-22 ASSESSMENT — VISUAL ACUITY
OS_CC: 20/25
OD_CC: 20/50

## 2020-10-22 ASSESSMENT — TONOMETRY
OD_IOP_MMHG: 18
OS_IOP_MMHG: 16

## 2020-10-23 ENCOUNTER — PATIENT MESSAGE (OUTPATIENT)
Dept: PSYCHIATRY | Facility: CLINIC | Age: 40
End: 2020-10-23

## 2020-10-23 ENCOUNTER — PATIENT OUTREACH (OUTPATIENT)
Dept: ADMINISTRATIVE | Facility: HOSPITAL | Age: 40
End: 2020-10-23

## 2020-10-23 DIAGNOSIS — Z11.59 NEED FOR HEPATITIS C SCREENING TEST: Primary | ICD-10-CM

## 2020-11-05 ENCOUNTER — PATIENT MESSAGE (OUTPATIENT)
Dept: ENDOCRINOLOGY | Facility: CLINIC | Age: 40
End: 2020-11-05

## 2020-11-13 ENCOUNTER — PATIENT OUTREACH (OUTPATIENT)
Dept: ADMINISTRATIVE | Facility: HOSPITAL | Age: 40
End: 2020-11-13

## 2020-11-16 ENCOUNTER — PATIENT MESSAGE (OUTPATIENT)
Dept: PSYCHIATRY | Facility: CLINIC | Age: 40
End: 2020-11-16

## 2020-11-16 ENCOUNTER — PATIENT MESSAGE (OUTPATIENT)
Dept: INTERNAL MEDICINE | Facility: CLINIC | Age: 40
End: 2020-11-16

## 2020-11-16 DIAGNOSIS — E11.9 TYPE 2 DIABETES MELLITUS WITHOUT COMPLICATION, WITH LONG-TERM CURRENT USE OF INSULIN: ICD-10-CM

## 2020-11-16 DIAGNOSIS — Z79.4 TYPE 2 DIABETES MELLITUS WITHOUT COMPLICATION, WITH LONG-TERM CURRENT USE OF INSULIN: ICD-10-CM

## 2020-11-16 RX ORDER — EMPAGLIFLOZIN 10 MG/1
10 TABLET, FILM COATED ORAL DAILY
Qty: 30 TABLET | Refills: 0 | Status: SHIPPED | OUTPATIENT
Start: 2020-11-16 | End: 2020-12-28

## 2020-11-17 DIAGNOSIS — F90.0 ATTENTION DEFICIT HYPERACTIVITY DISORDER (ADHD), PREDOMINANTLY INATTENTIVE TYPE: ICD-10-CM

## 2020-11-17 RX ORDER — DEXTROAMPHETAMINE SACCHARATE, AMPHETAMINE ASPARTATE MONOHYDRATE, DEXTROAMPHETAMINE SULFATE AND AMPHETAMINE SULFATE 5; 5; 5; 5 MG/1; MG/1; MG/1; MG/1
20 CAPSULE, EXTENDED RELEASE ORAL EVERY MORNING
Qty: 30 CAPSULE | Refills: 0 | Status: SHIPPED | OUTPATIENT
Start: 2020-11-17 | End: 2021-01-06 | Stop reason: SDUPTHER

## 2020-11-25 NOTE — PROGRESS NOTES
Outpatient Psychiatry Follow-Up Visit (MD/NP)    9/28/2020    Clinical Status of Patient:  Outpatient (Ambulatory)    Chief Complaint:  Chip Arora is a 40 y.o. male who presents today for follow-up of anxiety and attention problems.  Met with patient.      Last visit was: 5/21/2020. Chart and  reviewed.   The patient location is: home  The chief complaint leading to consultation is: anxiety and attention problems    Visit type: audiovisual    Face to Face time with patient: 26 minutes  30 minutes of total time spent on the encounter, which includes face to face time and non-face to face time preparing to see the patient (eg, review of tests), Obtaining and/or reviewing separately obtained history, Documenting clinical information in the electronic or other health record, Independently interpreting results (not separately reported) and communicating results to the patient/family/caregiver, or Care coordination (not separately reported).     Each patient to whom he or she provides medical services by telemedicine is:  (1) informed of the relationship between the physician and patient and the respective role of any other health care provider with respect to management of the patient; and (2) notified that he or she may decline to receive medical services by telemedicine and may withdraw from such care at any time.    Interval History and Content of Current Session:  Current Psychiatric Medications/changes  · Continue Wellbutrin  mg po daily.  .  · Change to Adderall XR 20 mg po daily  · Continue Buspar 7.5 mg po TID     Virtual Visit: discussed sitautional stessors related to COVID. Working from home. Reports good response to Adderall. Will increase Wellbutrin and continue Buspar for anxiety. Reports good response. Denies SI/HI/AVH.      Psychotherapy:  · Target symptoms: depression, lack of focus  · Why chosen therapy is appropriate versus another modality: relevant to diagnosis  · Outcome monitoring  methods: self-report  · Therapeutic intervention type: insight oriented psychotherapy  · Topics discussed/themes: building skills sets for symptom management, symptom recognition  · The patient's response to the intervention is accepting. The patient's progress toward treatment goals is good.   · Duration of intervention: 13 minutes.    Review of Systems   · PSYCHIATRIC: Pertinant items are noted in the narrative.  · CONSTITUTIONAL: No weight gain or loss.   · MUSCULOSKELETAL: No pain or stiffness of the joints.  · NEUROLOGIC: No weakness, sensory changes, seizures, confusion, memory loss, tremor or other abnormal movements.  · ENDOCRINE: No polydipsia or polyuria.  · INTEGUMENTARY: No rashes or lacerations.  · EYES: No exophthalmos, jaundice or blindness.  · ENT: No dizziness, tinnitus or hearing loss.  · RESPIRATORY: No shortness of breath.  · CARDIOVASCULAR: No tachycardia or chest pain.  · GASTROINTESTINAL: No nausea, vomiting, pain, constipation or diarrhea.  · GENITOURINARY: No frequency, dysuria or sexual dysfunction.  · HEMATOLOGIC/LYMPHATIC: No excessive bleeding, prolonged or excessive bleeding after dental extraction/injury.    Past Medical, Family and Social History: The patient's past medical, family and social history have been reviewed and updated as appropriate within the electronic medical record - see encounter notes.    Compliance: yes    Side effects: None    Risk Parameters:  Patient reports no suicidal ideation  Patient reports no homicidal ideation  Patient reports no self-injurious behavior  Patient reports no violent behavior    Exam (detailed: at least 9 elements; comprehensive: all 15 elements)   Constitutional  Vitals:  Most recent vital signs, dated greater than 90 days prior to this appointment, were reviewed.   There were no vitals filed for this visit.     General:  unremarkable, age appropriate     Musculoskeletal  Muscle Strength/Tone:  no tremor, no tic   Gait & Station:   non-ataxic     Psychiatric  Speech:  no latency; no press   Mood & Affect:  euthymic  congruent and appropriate   Thought Process:  normal and logical   Associations:  intact   Thought Content:  normal, no suicidality, no homicidality, delusions, or paranoia   Insight:  intact   Judgement: behavior is adequate to circumstances   Orientation:  grossly intact   Memory: intact for content of interview   Language: grossly intact   Attention Span & Concentration:  able to focus   Fund of Knowledge:  intact and appropriate to age and level of education     Assessment and Diagnosis   Status/Progress: Based on the examination today, the patient's problem(s) is/are adequately but not ideally controlled.  New problems have been presented today (depression).   Co-morbidities and Lack of compliance are not complicating management of the primary condition.  There are no active rule-out diagnoses for this patient at this time.     General Impression:       ICD-10-CM ICD-9-CM   1. Current mild episode of major depressive disorder, unspecified whether recurrent  F32.0 296.21   2. Attention deficit hyperactivity disorder (ADHD), predominantly inattentive type  F90.0 314.00       Intervention/Counseling/Treatment Plan   · Medication Management: The risks and benefits of medication were discussed with the patient.   · Increase to Wellbutrin  mg po daily.  .  · Change to Adderall XR 20 mg po daily  · Continue Buspar 7.5 mg po TID     Return to Clinic: 6 months    Risks, benefits, side effects and alternative treatments discussed with patient. Patient agrees with the current plan as documented.  Encouraged Patient to keep future appointments.  Take medications as prescribed and abstain from substance abuse.  Pt to present to ED for thoughts to harm himself or others     [Follow-Up Evaluation] : a follow-up evaluation for [FreeTextEntry2] : LUE weakness

## 2020-12-30 ENCOUNTER — PATIENT MESSAGE (OUTPATIENT)
Dept: ENDOCRINOLOGY | Facility: CLINIC | Age: 40
End: 2020-12-30

## 2020-12-30 DIAGNOSIS — Z79.4 TYPE 2 DIABETES MELLITUS WITHOUT COMPLICATION, WITH LONG-TERM CURRENT USE OF INSULIN: ICD-10-CM

## 2020-12-30 DIAGNOSIS — E11.9 TYPE 2 DIABETES MELLITUS WITHOUT COMPLICATION, WITH LONG-TERM CURRENT USE OF INSULIN: ICD-10-CM

## 2020-12-30 RX ORDER — EMPAGLIFLOZIN 10 MG/1
10 TABLET, FILM COATED ORAL DAILY
Qty: 90 TABLET | Refills: 3 | Status: SHIPPED | OUTPATIENT
Start: 2020-12-30 | End: 2022-03-29

## 2021-01-04 ENCOUNTER — PATIENT MESSAGE (OUTPATIENT)
Dept: ADMINISTRATIVE | Facility: HOSPITAL | Age: 41
End: 2021-01-04

## 2021-01-05 ENCOUNTER — PATIENT MESSAGE (OUTPATIENT)
Dept: PSYCHIATRY | Facility: CLINIC | Age: 41
End: 2021-01-05

## 2021-01-07 ENCOUNTER — PATIENT OUTREACH (OUTPATIENT)
Dept: ADMINISTRATIVE | Facility: HOSPITAL | Age: 41
End: 2021-01-07

## 2021-01-07 DIAGNOSIS — R80.9 TYPE 2 DIABETES MELLITUS WITH MICROALBUMINURIA, WITH LONG-TERM CURRENT USE OF INSULIN: Primary | ICD-10-CM

## 2021-01-07 DIAGNOSIS — Z79.4 TYPE 2 DIABETES MELLITUS WITH MICROALBUMINURIA, WITH LONG-TERM CURRENT USE OF INSULIN: Primary | ICD-10-CM

## 2021-01-07 DIAGNOSIS — I10 ESSENTIAL HYPERTENSION: ICD-10-CM

## 2021-01-07 DIAGNOSIS — E11.29 TYPE 2 DIABETES MELLITUS WITH MICROALBUMINURIA, WITH LONG-TERM CURRENT USE OF INSULIN: Primary | ICD-10-CM

## 2021-01-12 ENCOUNTER — FOLLOW UP (OUTPATIENT)
Dept: URBAN - METROPOLITAN AREA CLINIC 87 | Facility: CLINIC | Age: 41
End: 2021-01-12

## 2021-01-12 DIAGNOSIS — Z79.4: ICD-10-CM

## 2021-01-12 DIAGNOSIS — H43.11: ICD-10-CM

## 2021-01-12 DIAGNOSIS — H33.41: ICD-10-CM

## 2021-01-12 DIAGNOSIS — E11.3513: ICD-10-CM

## 2021-01-12 PROCEDURE — 67028 INJECTION EYE DRUG: CPT

## 2021-01-12 PROCEDURE — 92202 OPSCPY EXTND ON/MAC DRAW: CPT

## 2021-01-12 PROCEDURE — 92014 COMPRE OPH EXAM EST PT 1/>: CPT | Mod: 25

## 2021-01-12 PROCEDURE — 92134 CPTRZ OPH DX IMG PST SGM RTA: CPT

## 2021-01-12 ASSESSMENT — VISUAL ACUITY
OD_CC: 20/60-2
OS_CC: 20/25-1

## 2021-01-12 ASSESSMENT — TONOMETRY
OS_IOP_MMHG: 21
OD_IOP_MMHG: 22

## 2021-01-26 ENCOUNTER — PATIENT MESSAGE (OUTPATIENT)
Dept: PSYCHIATRY | Facility: CLINIC | Age: 41
End: 2021-01-26

## 2021-01-27 DIAGNOSIS — F90.0 ATTENTION DEFICIT HYPERACTIVITY DISORDER (ADHD), PREDOMINANTLY INATTENTIVE TYPE: ICD-10-CM

## 2021-01-27 RX ORDER — DEXTROAMPHETAMINE SACCHARATE, AMPHETAMINE ASPARTATE MONOHYDRATE, DEXTROAMPHETAMINE SULFATE AND AMPHETAMINE SULFATE 5; 5; 5; 5 MG/1; MG/1; MG/1; MG/1
20 CAPSULE, EXTENDED RELEASE ORAL EVERY MORNING
Qty: 30 CAPSULE | Refills: 0 | Status: SHIPPED | OUTPATIENT
Start: 2021-01-27 | End: 2021-03-04 | Stop reason: SDUPTHER

## 2021-02-09 ENCOUNTER — PATIENT MESSAGE (OUTPATIENT)
Dept: INTERNAL MEDICINE | Facility: CLINIC | Age: 41
End: 2021-02-09

## 2021-02-10 ENCOUNTER — PATIENT MESSAGE (OUTPATIENT)
Dept: INTERNAL MEDICINE | Facility: CLINIC | Age: 41
End: 2021-02-10

## 2021-02-17 ENCOUNTER — PATIENT MESSAGE (OUTPATIENT)
Dept: INTERNAL MEDICINE | Facility: CLINIC | Age: 41
End: 2021-02-17

## 2021-03-03 ENCOUNTER — PATIENT MESSAGE (OUTPATIENT)
Dept: PSYCHIATRY | Facility: CLINIC | Age: 41
End: 2021-03-03

## 2021-03-04 DIAGNOSIS — F90.0 ATTENTION DEFICIT HYPERACTIVITY DISORDER (ADHD), PREDOMINANTLY INATTENTIVE TYPE: ICD-10-CM

## 2021-03-04 RX ORDER — DEXTROAMPHETAMINE SACCHARATE, AMPHETAMINE ASPARTATE MONOHYDRATE, DEXTROAMPHETAMINE SULFATE AND AMPHETAMINE SULFATE 5; 5; 5; 5 MG/1; MG/1; MG/1; MG/1
20 CAPSULE, EXTENDED RELEASE ORAL EVERY MORNING
Qty: 30 CAPSULE | Refills: 0 | Status: SHIPPED | OUTPATIENT
Start: 2021-03-04

## 2021-03-23 ENCOUNTER — FOLLOW UP (OUTPATIENT)
Dept: URBAN - METROPOLITAN AREA CLINIC 87 | Facility: CLINIC | Age: 41
End: 2021-03-23

## 2021-03-23 VITALS — HEIGHT: 60 IN

## 2021-03-23 DIAGNOSIS — Z79.4: ICD-10-CM

## 2021-03-23 DIAGNOSIS — H43.11: ICD-10-CM

## 2021-03-23 DIAGNOSIS — H33.41: ICD-10-CM

## 2021-03-23 DIAGNOSIS — E11.3513: ICD-10-CM

## 2021-03-23 LAB
LEFT EYE DM RETINOPATHY: NORMAL
RIGHT EYE DM RETINOPATHY: NORMAL

## 2021-03-23 PROCEDURE — 92134 CPTRZ OPH DX IMG PST SGM RTA: CPT

## 2021-03-23 PROCEDURE — 67028 INJECTION EYE DRUG: CPT

## 2021-03-23 PROCEDURE — 92014 COMPRE OPH EXAM EST PT 1/>: CPT | Mod: 25

## 2021-03-23 ASSESSMENT — VISUAL ACUITY
OS_CC: 20/20
OD_CC: 20/60+2

## 2021-03-23 ASSESSMENT — TONOMETRY
OD_IOP_MMHG: 21
OS_IOP_MMHG: 20

## 2021-04-05 ENCOUNTER — PATIENT MESSAGE (OUTPATIENT)
Dept: ADMINISTRATIVE | Facility: HOSPITAL | Age: 41
End: 2021-04-05

## 2021-04-16 ENCOUNTER — PATIENT MESSAGE (OUTPATIENT)
Dept: ENDOCRINOLOGY | Facility: CLINIC | Age: 41
End: 2021-04-16

## 2021-04-21 DIAGNOSIS — E11.9 TYPE 2 DIABETES MELLITUS WITHOUT COMPLICATION: ICD-10-CM

## 2021-05-27 ENCOUNTER — PATIENT OUTREACH (OUTPATIENT)
Dept: ADMINISTRATIVE | Facility: HOSPITAL | Age: 41
End: 2021-05-27

## 2021-06-08 ENCOUNTER — FOLLOW UP (OUTPATIENT)
Dept: URBAN - METROPOLITAN AREA CLINIC 87 | Facility: CLINIC | Age: 41
End: 2021-06-08

## 2021-06-08 DIAGNOSIS — H33.41: ICD-10-CM

## 2021-06-08 DIAGNOSIS — E11.3513: ICD-10-CM

## 2021-06-08 DIAGNOSIS — Z79.4: ICD-10-CM

## 2021-06-08 DIAGNOSIS — H43.11: ICD-10-CM

## 2021-06-08 PROCEDURE — 67028 INJECTION EYE DRUG: CPT

## 2021-06-08 PROCEDURE — 92134 CPTRZ OPH DX IMG PST SGM RTA: CPT

## 2021-06-08 PROCEDURE — 92014 COMPRE OPH EXAM EST PT 1/>: CPT | Mod: 25

## 2021-06-08 ASSESSMENT — TONOMETRY
OD_IOP_MMHG: 22
OD_IOP_MMHG: 15
OS_IOP_MMHG: 22
OS_IOP_MMHG: 14

## 2021-06-08 ASSESSMENT — VISUAL ACUITY
OS_CC: 20/25-2
OD_CC: 20/60-2

## 2021-07-06 ENCOUNTER — PATIENT MESSAGE (OUTPATIENT)
Dept: ADMINISTRATIVE | Facility: HOSPITAL | Age: 41
End: 2021-07-06

## 2021-08-03 ENCOUNTER — PATIENT MESSAGE (OUTPATIENT)
Dept: ADMINISTRATIVE | Facility: HOSPITAL | Age: 41
End: 2021-08-03

## 2021-09-06 DIAGNOSIS — E11.9 TYPE 2 DIABETES MELLITUS WITHOUT COMPLICATION, WITH LONG-TERM CURRENT USE OF INSULIN: ICD-10-CM

## 2021-09-06 DIAGNOSIS — Z79.4 TYPE 2 DIABETES MELLITUS WITHOUT COMPLICATION, WITH LONG-TERM CURRENT USE OF INSULIN: ICD-10-CM

## 2021-09-07 ENCOUNTER — PATIENT MESSAGE (OUTPATIENT)
Dept: INTERNAL MEDICINE | Facility: CLINIC | Age: 41
End: 2021-09-07

## 2021-09-07 RX ORDER — OLMESARTAN MEDOXOMIL 20 MG/1
TABLET ORAL
Qty: 90 TABLET | Refills: 3 | Status: SHIPPED | OUTPATIENT
Start: 2021-09-07 | End: 2022-09-12

## 2021-09-14 ENCOUNTER — FOLLOW UP (OUTPATIENT)
Dept: URBAN - METROPOLITAN AREA CLINIC 87 | Facility: CLINIC | Age: 41
End: 2021-09-14

## 2021-09-14 DIAGNOSIS — H33.41: ICD-10-CM

## 2021-09-14 DIAGNOSIS — H35.033: ICD-10-CM

## 2021-09-14 DIAGNOSIS — H43.11: ICD-10-CM

## 2021-09-14 DIAGNOSIS — E11.3513: ICD-10-CM

## 2021-09-14 DIAGNOSIS — Z79.4: ICD-10-CM

## 2021-09-14 PROCEDURE — 92134 CPTRZ OPH DX IMG PST SGM RTA: CPT

## 2021-09-14 PROCEDURE — 67028 INJECTION EYE DRUG: CPT

## 2021-09-14 PROCEDURE — 92202 OPSCPY EXTND ON/MAC DRAW: CPT | Mod: 59

## 2021-09-14 PROCEDURE — 92014 COMPRE OPH EXAM EST PT 1/>: CPT | Mod: 25

## 2021-09-14 ASSESSMENT — VISUAL ACUITY
OS_CC: 20/20-2
OD_CC: 20/50

## 2021-09-14 ASSESSMENT — TONOMETRY
OS_IOP_MMHG: 19
OD_IOP_MMHG: 20

## 2021-10-04 ENCOUNTER — PATIENT MESSAGE (OUTPATIENT)
Dept: ADMINISTRATIVE | Facility: HOSPITAL | Age: 41
End: 2021-10-04

## 2021-12-02 ENCOUNTER — FOLLOW UP (OUTPATIENT)
Dept: URBAN - METROPOLITAN AREA CLINIC 87 | Facility: CLINIC | Age: 41
End: 2021-12-02

## 2021-12-02 DIAGNOSIS — E11.3513: ICD-10-CM

## 2021-12-02 DIAGNOSIS — H43.11: ICD-10-CM

## 2021-12-02 DIAGNOSIS — H33.41: ICD-10-CM

## 2021-12-02 DIAGNOSIS — Z79.4: ICD-10-CM

## 2021-12-02 DIAGNOSIS — H35.033: ICD-10-CM

## 2021-12-02 PROCEDURE — 92014 COMPRE OPH EXAM EST PT 1/>: CPT | Mod: 25

## 2021-12-02 PROCEDURE — 92134 CPTRZ OPH DX IMG PST SGM RTA: CPT

## 2021-12-02 PROCEDURE — 67028 INJECTION EYE DRUG: CPT

## 2021-12-02 ASSESSMENT — VISUAL ACUITY
OD_CC: 20/40-1
OS_CC: 20/20-1

## 2021-12-02 ASSESSMENT — TONOMETRY
OS_IOP_MMHG: 20
OD_IOP_MMHG: 18

## 2021-12-21 RX ORDER — INSULIN DEGLUDEC 200 U/ML
INJECTION, SOLUTION SUBCUTANEOUS
Qty: 18 PEN | Refills: 0 | Status: SHIPPED | OUTPATIENT
Start: 2021-12-21

## 2022-01-25 ENCOUNTER — PATIENT MESSAGE (OUTPATIENT)
Dept: ADMINISTRATIVE | Facility: HOSPITAL | Age: 42
End: 2022-01-25
Payer: COMMERCIAL

## 2022-03-02 ENCOUNTER — TELEPHONE (OUTPATIENT)
Dept: PRIMARY CARE CLINIC | Facility: CLINIC | Age: 42
End: 2022-03-02
Payer: COMMERCIAL

## 2022-03-02 NOTE — TELEPHONE ENCOUNTER
I called the patient about a his overdue labs and home blood pressure reading. The patient states that he has moved to [facility] and no longer a patient of Dr. Albert. Chart updated.

## 2022-03-03 ENCOUNTER — FOLLOW UP (OUTPATIENT)
Dept: URBAN - METROPOLITAN AREA CLINIC 87 | Facility: CLINIC | Age: 42
End: 2022-03-03

## 2022-03-03 DIAGNOSIS — H25.13: ICD-10-CM

## 2022-03-03 DIAGNOSIS — H35.033: ICD-10-CM

## 2022-03-03 DIAGNOSIS — E11.3513: ICD-10-CM

## 2022-03-03 DIAGNOSIS — Z79.4: ICD-10-CM

## 2022-03-03 DIAGNOSIS — E11.3531: ICD-10-CM

## 2022-03-03 PROCEDURE — 67028 INJECTION EYE DRUG: CPT

## 2022-03-03 PROCEDURE — 92014 COMPRE OPH EXAM EST PT 1/>: CPT | Mod: 25

## 2022-03-03 PROCEDURE — 92201 OPSCPY EXTND RTA DRAW UNI/BI: CPT | Mod: 59

## 2022-03-03 PROCEDURE — 92134 CPTRZ OPH DX IMG PST SGM RTA: CPT

## 2022-03-03 ASSESSMENT — VISUAL ACUITY
OD_CC: 20/40-2
OS_CC: 20/25

## 2022-03-03 ASSESSMENT — TONOMETRY
OS_IOP_MMHG: 21
OD_IOP_MMHG: 21

## 2022-03-29 DIAGNOSIS — E11.9 TYPE 2 DIABETES MELLITUS WITHOUT COMPLICATION, WITH LONG-TERM CURRENT USE OF INSULIN: ICD-10-CM

## 2022-03-29 DIAGNOSIS — Z79.4 TYPE 2 DIABETES MELLITUS WITHOUT COMPLICATION, WITH LONG-TERM CURRENT USE OF INSULIN: ICD-10-CM

## 2022-03-29 RX ORDER — METFORMIN HYDROCHLORIDE 500 MG/1
TABLET, EXTENDED RELEASE ORAL
Qty: 90 TABLET | Refills: 3 | Status: SHIPPED | OUTPATIENT
Start: 2022-03-29

## 2022-03-29 RX ORDER — EMPAGLIFLOZIN 10 MG/1
10 TABLET, FILM COATED ORAL DAILY
Qty: 90 TABLET | Refills: 0 | Status: SHIPPED | OUTPATIENT
Start: 2022-03-29

## 2022-03-29 NOTE — TELEPHONE ENCOUNTER
This Rx Request does not qualify for assessment with the OR[facility]   Please review protocol details and the Care Due Message for extra clinical information    Reasons Rx Request may be deferred:  No Established PCP for [facility]C to sign under    Note composed:1:29 PM 03/29/2022

## 2022-04-01 ENCOUNTER — TELEPHONE (OUTPATIENT)
Dept: INTERNAL MEDICINE | Facility: CLINIC | Age: 42
End: 2022-04-01
Payer: COMMERCIAL

## 2022-04-01 NOTE — TELEPHONE ENCOUNTER
----- Message from Padmaja Reynaldojuan luis sent at 4/1/2022 11:20 AM CDT -----  Contact: Lulú elliott/ [phone]  Requesting an RX refill or new RX.  Is this a refill or new RX:   RX name and strength (copy/paste from chart):  TRESIBA FLEXTOUCH U-200 200 unit/mL (3 mL) insulin pen  Is this a 30 day or 90 day RX: 90  Pharmacy name and phone # (copy/paste from chart):    [facility] - [address] [address]  Phone: [phone] Fax: [phone]       The doctors have asked that we provide their patients with the following 2 reminders -- prescription refills can take up to 72 hours, and a friendly reminder that in the future you can use your [facility] account to request refills: yes

## 2022-04-01 NOTE — TELEPHONE ENCOUNTER
LVM for pt letting him know he needs to schedule an apt prior to med refills and that he can make an apt with any one of dr michel's colleagues since she I booked

## 2022-06-15 ENCOUNTER — PROBLEM (OUTPATIENT)
Dept: URBAN - METROPOLITAN AREA CLINIC 1 | Facility: CLINIC | Age: 42
End: 2022-06-15

## 2022-06-15 DIAGNOSIS — Z79.4: ICD-10-CM

## 2022-06-15 DIAGNOSIS — E11.3513: ICD-10-CM

## 2022-06-15 DIAGNOSIS — E11.3531: ICD-10-CM

## 2022-06-15 PROCEDURE — 99213 OFFICE O/P EST LOW 20 MIN: CPT | Mod: 25

## 2022-06-15 PROCEDURE — 67028 INJECTION EYE DRUG: CPT

## 2022-06-15 PROCEDURE — 92134 CPTRZ OPH DX IMG PST SGM RTA: CPT

## 2022-06-15 ASSESSMENT — VISUAL ACUITY: OD_SC: 20/40-1

## 2022-06-15 ASSESSMENT — TONOMETRY
OS_IOP_MMHG: 8
OD_IOP_MMHG: 26

## 2022-06-23 ENCOUNTER — PROBLEM (OUTPATIENT)
Dept: URBAN - METROPOLITAN AREA CLINIC 87 | Facility: CLINIC | Age: 42
End: 2022-06-23

## 2022-06-23 DIAGNOSIS — E11.3513: ICD-10-CM

## 2022-06-23 DIAGNOSIS — H25.13: ICD-10-CM

## 2022-06-23 DIAGNOSIS — E11.3531: ICD-10-CM

## 2022-06-23 DIAGNOSIS — H43.11: ICD-10-CM

## 2022-06-23 DIAGNOSIS — H35.033: ICD-10-CM

## 2022-06-23 DIAGNOSIS — Z79.4: ICD-10-CM

## 2022-06-23 LAB
LEFT EYE DM RETINOPATHY: NEGATIVE
RIGHT EYE DM RETINOPATHY: NEGATIVE

## 2022-06-23 PROCEDURE — 92134 CPTRZ OPH DX IMG PST SGM RTA: CPT

## 2022-06-23 PROCEDURE — 92202 OPSCPY EXTND ON/MAC DRAW: CPT

## 2022-06-23 PROCEDURE — 92014 COMPRE OPH EXAM EST PT 1/>: CPT

## 2022-06-23 ASSESSMENT — VISUAL ACUITY
OD_CC: 20/40-2
OS_CC: 20/25-2

## 2022-06-23 ASSESSMENT — TONOMETRY
OS_IOP_MMHG: 18
OD_IOP_MMHG: 21

## 2022-06-30 ENCOUNTER — PATIENT OUTREACH (OUTPATIENT)
Dept: ADMINISTRATIVE | Facility: HOSPITAL | Age: 42
End: 2022-06-30
Payer: COMMERCIAL

## 2022-07-26 ENCOUNTER — FOLLOW UP (OUTPATIENT)
Dept: URBAN - METROPOLITAN AREA CLINIC 87 | Facility: CLINIC | Age: 42
End: 2022-07-26

## 2022-07-26 DIAGNOSIS — H43.11: ICD-10-CM

## 2022-07-26 DIAGNOSIS — H35.033: ICD-10-CM

## 2022-07-26 DIAGNOSIS — Z79.4: ICD-10-CM

## 2022-07-26 DIAGNOSIS — H25.13: ICD-10-CM

## 2022-07-26 DIAGNOSIS — E11.3513: ICD-10-CM

## 2022-07-26 DIAGNOSIS — E11.3531: ICD-10-CM

## 2022-07-26 PROCEDURE — 92014 COMPRE OPH EXAM EST PT 1/>: CPT | Mod: 25

## 2022-07-26 PROCEDURE — 67028 INJECTION EYE DRUG: CPT

## 2022-07-26 PROCEDURE — 92134 CPTRZ OPH DX IMG PST SGM RTA: CPT

## 2022-07-26 ASSESSMENT — TONOMETRY
OD_IOP_MMHG: 19
OS_IOP_MMHG: 17

## 2022-07-26 ASSESSMENT — VISUAL ACUITY
OS_CC: 20/20-1
OD_CC: 20/50+1

## 2022-09-08 ENCOUNTER — FOLLOW UP (OUTPATIENT)
Dept: URBAN - METROPOLITAN AREA CLINIC 87 | Facility: CLINIC | Age: 42
End: 2022-09-08

## 2022-09-08 DIAGNOSIS — H43.11: ICD-10-CM

## 2022-09-08 DIAGNOSIS — Z79.4: ICD-10-CM

## 2022-09-08 DIAGNOSIS — H35.033: ICD-10-CM

## 2022-09-08 DIAGNOSIS — H25.13: ICD-10-CM

## 2022-09-08 DIAGNOSIS — E11.3531: ICD-10-CM

## 2022-09-08 DIAGNOSIS — E11.3513: ICD-10-CM

## 2022-09-08 PROCEDURE — 92134 CPTRZ OPH DX IMG PST SGM RTA: CPT

## 2022-09-08 PROCEDURE — C9257 BEVACIZUMAB INJECTION: HCPCS

## 2022-09-08 PROCEDURE — 92014 COMPRE OPH EXAM EST PT 1/>: CPT | Mod: 25

## 2022-09-08 PROCEDURE — 67028 INJECTION EYE DRUG: CPT

## 2022-09-08 PROCEDURE — A AVASTIN

## 2022-09-08 ASSESSMENT — TONOMETRY
OS_IOP_MMHG: 19
OD_IOP_MMHG: 14

## 2022-09-08 ASSESSMENT — VISUAL ACUITY
OD_CC: 20/50-1
OS_CC: 20/25-2

## 2022-10-20 ENCOUNTER — FOLLOW UP (OUTPATIENT)
Dept: URBAN - METROPOLITAN AREA CLINIC 87 | Facility: CLINIC | Age: 42
End: 2022-10-20

## 2022-10-20 DIAGNOSIS — E11.3513: ICD-10-CM

## 2022-10-20 DIAGNOSIS — E11.3531: ICD-10-CM

## 2022-10-20 DIAGNOSIS — Z79.4: ICD-10-CM

## 2022-10-20 DIAGNOSIS — H43.11: ICD-10-CM

## 2022-10-20 DIAGNOSIS — H35.033: ICD-10-CM

## 2022-10-20 DIAGNOSIS — H25.13: ICD-10-CM

## 2022-10-20 PROCEDURE — 92014 COMPRE OPH EXAM EST PT 1/>: CPT | Mod: 25

## 2022-10-20 PROCEDURE — 67028 INJECTION EYE DRUG: CPT

## 2022-10-20 PROCEDURE — 92134 CPTRZ OPH DX IMG PST SGM RTA: CPT

## 2022-10-20 ASSESSMENT — TONOMETRY
OD_IOP_MMHG: 21
OS_IOP_MMHG: 21

## 2022-10-20 ASSESSMENT — VISUAL ACUITY
OS_CC: 20/30-2
OD_CC: 20/50-2

## 2022-12-01 ENCOUNTER — FOLLOW UP (OUTPATIENT)
Dept: URBAN - METROPOLITAN AREA CLINIC 87 | Facility: CLINIC | Age: 42
End: 2022-12-01

## 2022-12-01 DIAGNOSIS — E11.3531: ICD-10-CM

## 2022-12-01 DIAGNOSIS — H43.11: ICD-10-CM

## 2022-12-01 DIAGNOSIS — H25.13: ICD-10-CM

## 2022-12-01 DIAGNOSIS — Z79.4: ICD-10-CM

## 2022-12-01 DIAGNOSIS — E11.3513: ICD-10-CM

## 2022-12-01 DIAGNOSIS — H35.033: ICD-10-CM

## 2022-12-01 PROCEDURE — 92014 COMPRE OPH EXAM EST PT 1/>: CPT | Mod: 25

## 2022-12-01 PROCEDURE — 92134 CPTRZ OPH DX IMG PST SGM RTA: CPT

## 2022-12-01 PROCEDURE — 67028 INJECTION EYE DRUG: CPT

## 2022-12-01 ASSESSMENT — TONOMETRY
OS_IOP_MMHG: 20
OD_IOP_MMHG: 20

## 2022-12-01 ASSESSMENT — VISUAL ACUITY
OD_CC: 20/40-1
OS_CC: 20/30-1

## 2023-01-19 ENCOUNTER — FOLLOW UP (OUTPATIENT)
Dept: URBAN - METROPOLITAN AREA CLINIC 87 | Facility: CLINIC | Age: 43
End: 2023-01-19

## 2023-01-19 DIAGNOSIS — E11.3531: ICD-10-CM

## 2023-01-19 DIAGNOSIS — H35.033: ICD-10-CM

## 2023-01-19 DIAGNOSIS — H25.13: ICD-10-CM

## 2023-01-19 DIAGNOSIS — Z79.4: ICD-10-CM

## 2023-01-19 DIAGNOSIS — H43.11: ICD-10-CM

## 2023-01-19 DIAGNOSIS — E11.3513: ICD-10-CM

## 2023-01-19 PROCEDURE — 92014 COMPRE OPH EXAM EST PT 1/>: CPT | Mod: 25

## 2023-01-19 PROCEDURE — 67028 INJECTION EYE DRUG: CPT

## 2023-01-19 PROCEDURE — 92134 CPTRZ OPH DX IMG PST SGM RTA: CPT

## 2023-01-19 ASSESSMENT — TONOMETRY
OD_IOP_MMHG: 20
OS_IOP_MMHG: 17

## 2023-01-19 ASSESSMENT — VISUAL ACUITY
OD_CC: 20/30
OS_CC: 20/25

## 2023-02-09 ENCOUNTER — PATIENT OUTREACH (OUTPATIENT)
Dept: ADMINISTRATIVE | Facility: HOSPITAL | Age: 43
End: 2023-02-09
Payer: COMMERCIAL

## 2023-03-23 ENCOUNTER — FOLLOW UP (OUTPATIENT)
Dept: URBAN - METROPOLITAN AREA CLINIC 87 | Facility: CLINIC | Age: 43
End: 2023-03-23

## 2023-03-23 DIAGNOSIS — H43.11: ICD-10-CM

## 2023-03-23 DIAGNOSIS — H25.13: ICD-10-CM

## 2023-03-23 DIAGNOSIS — E11.3531: ICD-10-CM

## 2023-03-23 DIAGNOSIS — E11.3513: ICD-10-CM

## 2023-03-23 DIAGNOSIS — Z79.4: ICD-10-CM

## 2023-03-23 DIAGNOSIS — H35.033: ICD-10-CM

## 2023-03-23 PROCEDURE — 92014 COMPRE OPH EXAM EST PT 1/>: CPT | Mod: 25

## 2023-03-23 PROCEDURE — 67028 INJECTION EYE DRUG: CPT

## 2023-03-23 PROCEDURE — 92134 CPTRZ OPH DX IMG PST SGM RTA: CPT

## 2023-03-23 ASSESSMENT — TONOMETRY
OS_IOP_MMHG: 18
OD_IOP_MMHG: 21

## 2023-03-23 ASSESSMENT — VISUAL ACUITY
OS_CC: 20/25-1
OD_CC: 20/50-1

## 2023-05-11 ENCOUNTER — FOLLOW UP (OUTPATIENT)
Dept: URBAN - METROPOLITAN AREA CLINIC 87 | Facility: CLINIC | Age: 43
End: 2023-05-11

## 2023-05-11 DIAGNOSIS — H25.13: ICD-10-CM

## 2023-05-11 DIAGNOSIS — E11.3513: ICD-10-CM

## 2023-05-11 DIAGNOSIS — H35.033: ICD-10-CM

## 2023-05-11 DIAGNOSIS — Z79.4: ICD-10-CM

## 2023-05-11 DIAGNOSIS — E11.3531: ICD-10-CM

## 2023-05-11 DIAGNOSIS — H43.11: ICD-10-CM

## 2023-05-11 PROCEDURE — 92014 COMPRE OPH EXAM EST PT 1/>: CPT | Mod: 25

## 2023-05-11 PROCEDURE — 92134 CPTRZ OPH DX IMG PST SGM RTA: CPT

## 2023-05-11 PROCEDURE — 67028 INJECTION EYE DRUG: CPT

## 2023-05-11 PROCEDURE — C9257 BEVACIZUMAB INJECTION: HCPCS

## 2023-05-11 PROCEDURE — A AVASTIN

## 2023-05-11 ASSESSMENT — VISUAL ACUITY
OS_CC: 20/25
OD_CC: 20/50

## 2023-05-11 ASSESSMENT — TONOMETRY
OS_IOP_MMHG: 22
OD_IOP_MMHG: 22

## 2023-07-20 ENCOUNTER — FOLLOW UP (OUTPATIENT)
Dept: URBAN - METROPOLITAN AREA CLINIC 87 | Facility: CLINIC | Age: 43
End: 2023-07-20

## 2023-07-20 DIAGNOSIS — H25.13: ICD-10-CM

## 2023-07-20 DIAGNOSIS — H43.11: ICD-10-CM

## 2023-07-20 DIAGNOSIS — H35.033: ICD-10-CM

## 2023-07-20 DIAGNOSIS — Z79.4: ICD-10-CM

## 2023-07-20 DIAGNOSIS — E11.3513: ICD-10-CM

## 2023-07-20 DIAGNOSIS — E11.3531: ICD-10-CM

## 2023-07-20 PROCEDURE — 92014 COMPRE OPH EXAM EST PT 1/>: CPT | Mod: 25

## 2023-07-20 PROCEDURE — 92134 CPTRZ OPH DX IMG PST SGM RTA: CPT

## 2023-07-20 PROCEDURE — 67028 INJECTION EYE DRUG: CPT

## 2023-07-20 ASSESSMENT — VISUAL ACUITY
OD_CC: 20/50+2
OS_CC: 20/30-1

## 2023-07-20 ASSESSMENT — TONOMETRY
OS_IOP_MMHG: 21
OD_IOP_MMHG: 23

## 2023-09-21 ENCOUNTER — FOLLOW UP (OUTPATIENT)
Dept: URBAN - METROPOLITAN AREA CLINIC 87 | Facility: CLINIC | Age: 43
End: 2023-09-21

## 2023-09-21 DIAGNOSIS — E11.3531: ICD-10-CM

## 2023-09-21 DIAGNOSIS — Z79.4: ICD-10-CM

## 2023-09-21 DIAGNOSIS — H35.033: ICD-10-CM

## 2023-09-21 DIAGNOSIS — H25.13: ICD-10-CM

## 2023-09-21 DIAGNOSIS — E11.3513: ICD-10-CM

## 2023-09-21 DIAGNOSIS — H43.11: ICD-10-CM

## 2023-09-21 PROCEDURE — 67028 INJECTION EYE DRUG: CPT

## 2023-09-21 PROCEDURE — 92014 COMPRE OPH EXAM EST PT 1/>: CPT | Mod: 25

## 2023-09-21 PROCEDURE — 92202 OPSCPY EXTND ON/MAC DRAW: CPT | Mod: NC

## 2023-09-21 PROCEDURE — 92134 CPTRZ OPH DX IMG PST SGM RTA: CPT

## 2023-09-21 ASSESSMENT — TONOMETRY
OS_IOP_MMHG: 21
OD_IOP_MMHG: 25

## 2023-09-21 ASSESSMENT — VISUAL ACUITY
OS_CC: 20/25-2
OD_CC: 20/40-2

## 2023-11-30 ENCOUNTER — FOLLOW UP (OUTPATIENT)
Dept: URBAN - METROPOLITAN AREA CLINIC 87 | Facility: CLINIC | Age: 43
End: 2023-11-30

## 2023-11-30 DIAGNOSIS — H25.13: ICD-10-CM

## 2023-11-30 DIAGNOSIS — E11.3531: ICD-10-CM

## 2023-11-30 DIAGNOSIS — H35.033: ICD-10-CM

## 2023-11-30 DIAGNOSIS — Z79.4: ICD-10-CM

## 2023-11-30 DIAGNOSIS — H43.11: ICD-10-CM

## 2023-11-30 DIAGNOSIS — E11.3513: ICD-10-CM

## 2023-11-30 PROCEDURE — 92014 COMPRE OPH EXAM EST PT 1/>: CPT | Mod: 25

## 2023-11-30 PROCEDURE — 67028 INJECTION EYE DRUG: CPT

## 2023-11-30 PROCEDURE — 92202 OPSCPY EXTND ON/MAC DRAW: CPT | Mod: NC

## 2023-11-30 PROCEDURE — 92134 CPTRZ OPH DX IMG PST SGM RTA: CPT

## 2023-11-30 ASSESSMENT — VISUAL ACUITY
OD_CC: 20/40-2
OS_CC: 20/25-1

## 2023-11-30 ASSESSMENT — TONOMETRY
OS_IOP_MMHG: 19
OD_IOP_MMHG: 20

## 2024-01-25 ENCOUNTER — PROBLEM (OUTPATIENT)
Dept: URBAN - METROPOLITAN AREA CLINIC 87 | Facility: CLINIC | Age: 44
End: 2024-01-25

## 2024-01-25 DIAGNOSIS — H35.033: ICD-10-CM

## 2024-01-25 DIAGNOSIS — E11.3531: ICD-10-CM

## 2024-01-25 DIAGNOSIS — H43.13: ICD-10-CM

## 2024-01-25 DIAGNOSIS — H43.11: ICD-10-CM

## 2024-01-25 DIAGNOSIS — H25.13: ICD-10-CM

## 2024-01-25 DIAGNOSIS — E11.3513: ICD-10-CM

## 2024-01-25 DIAGNOSIS — Z79.4: ICD-10-CM

## 2024-01-25 PROCEDURE — 92250 FUNDUS PHOTOGRAPHY W/I&R: CPT

## 2024-01-25 PROCEDURE — J0178S EYLEA PFS SAMPLE

## 2024-01-25 PROCEDURE — 92134 CPTRZ OPH DX IMG PST SGM RTA: CPT

## 2024-01-25 PROCEDURE — 92014 COMPRE OPH EXAM EST PT 1/>: CPT | Mod: 25

## 2024-01-25 PROCEDURE — 92202 OPSCPY EXTND ON/MAC DRAW: CPT

## 2024-01-25 PROCEDURE — 67028 INJECTION EYE DRUG: CPT

## 2024-01-25 ASSESSMENT — VISUAL ACUITY
OS_CC: 20/40-1
OD_CC: 20/30-2

## 2024-01-25 ASSESSMENT — TONOMETRY
OD_IOP_MMHG: 22
OS_IOP_MMHG: 22

## 2024-02-15 ENCOUNTER — FOLLOW UP (OUTPATIENT)
Dept: URBAN - METROPOLITAN AREA CLINIC 87 | Facility: CLINIC | Age: 44
End: 2024-02-15

## 2024-02-15 DIAGNOSIS — H43.13: ICD-10-CM

## 2024-02-15 DIAGNOSIS — E11.3513: ICD-10-CM

## 2024-02-15 DIAGNOSIS — E11.3531: ICD-10-CM

## 2024-02-15 DIAGNOSIS — Z79.4: ICD-10-CM

## 2024-02-15 DIAGNOSIS — H35.033: ICD-10-CM

## 2024-02-15 DIAGNOSIS — H25.13: ICD-10-CM

## 2024-02-15 PROCEDURE — 67028 INJECTION EYE DRUG: CPT

## 2024-02-15 PROCEDURE — 92134 CPTRZ OPH DX IMG PST SGM RTA: CPT

## 2024-02-15 PROCEDURE — 92014 COMPRE OPH EXAM EST PT 1/>: CPT | Mod: 25

## 2024-02-15 ASSESSMENT — TONOMETRY
OS_IOP_MMHG: 24
OD_IOP_MMHG: 24

## 2024-02-15 ASSESSMENT — VISUAL ACUITY
OD_CC: 20/30-2
OS_CC: 20/30+2

## 2024-05-02 ENCOUNTER — FOLLOW UP (OUTPATIENT)
Dept: URBAN - METROPOLITAN AREA CLINIC 87 | Facility: CLINIC | Age: 44
End: 2024-05-02

## 2024-05-02 DIAGNOSIS — E11.3531: ICD-10-CM

## 2024-05-02 DIAGNOSIS — H25.13: ICD-10-CM

## 2024-05-02 DIAGNOSIS — E11.3513: ICD-10-CM

## 2024-05-02 DIAGNOSIS — Z79.4: ICD-10-CM

## 2024-05-02 DIAGNOSIS — H21.569: ICD-10-CM

## 2024-05-02 DIAGNOSIS — H35.033: ICD-10-CM

## 2024-05-02 DIAGNOSIS — H43.13: ICD-10-CM

## 2024-05-02 PROCEDURE — 67028 INJECTION EYE DRUG: CPT | Mod: 50

## 2024-05-02 PROCEDURE — 92202 OPSCPY EXTND ON/MAC DRAW: CPT | Mod: NC

## 2024-05-02 PROCEDURE — 92014 COMPRE OPH EXAM EST PT 1/>: CPT | Mod: 25

## 2024-05-02 PROCEDURE — 92134 CPTRZ OPH DX IMG PST SGM RTA: CPT

## 2024-05-02 ASSESSMENT — VISUAL ACUITY
OS_CC: 20/25
OD_CC: 20/50-2

## 2024-05-02 ASSESSMENT — TONOMETRY
OS_IOP_MMHG: 23
OD_IOP_MMHG: 24

## 2024-07-09 ENCOUNTER — FOLLOW UP (OUTPATIENT)
Dept: URBAN - METROPOLITAN AREA CLINIC 87 | Facility: CLINIC | Age: 44
End: 2024-07-09

## 2024-07-09 DIAGNOSIS — H43.13: ICD-10-CM

## 2024-07-09 DIAGNOSIS — Z79.4: ICD-10-CM

## 2024-07-09 DIAGNOSIS — E11.3531: ICD-10-CM

## 2024-07-09 DIAGNOSIS — E11.3513: ICD-10-CM

## 2024-07-09 DIAGNOSIS — H25.13: ICD-10-CM

## 2024-07-09 DIAGNOSIS — H21.569: ICD-10-CM

## 2024-07-09 DIAGNOSIS — H35.033: ICD-10-CM

## 2024-07-09 PROCEDURE — 92202 OPSCPY EXTND ON/MAC DRAW: CPT | Mod: NC

## 2024-07-09 PROCEDURE — 92134 CPTRZ OPH DX IMG PST SGM RTA: CPT

## 2024-07-09 PROCEDURE — 67028 INJECTION EYE DRUG: CPT | Mod: 50

## 2024-07-09 PROCEDURE — 92014 COMPRE OPH EXAM EST PT 1/>: CPT | Mod: 25

## 2024-07-09 ASSESSMENT — TONOMETRY
OS_IOP_MMHG: 22
OD_IOP_MMHG: 23

## 2024-07-09 ASSESSMENT — VISUAL ACUITY
OS_CC: 20/30-1
OD_CC: 20/40-2

## 2024-09-19 ENCOUNTER — FOLLOW UP (OUTPATIENT)
Dept: URBAN - METROPOLITAN AREA CLINIC 87 | Facility: CLINIC | Age: 44
End: 2024-09-19

## 2024-09-19 DIAGNOSIS — E11.3531: ICD-10-CM

## 2024-09-19 DIAGNOSIS — H43.13: ICD-10-CM

## 2024-09-19 DIAGNOSIS — E11.3513: ICD-10-CM

## 2024-09-19 DIAGNOSIS — H35.033: ICD-10-CM

## 2024-09-19 DIAGNOSIS — H25.13: ICD-10-CM

## 2024-09-19 DIAGNOSIS — Z79.4: ICD-10-CM

## 2024-09-19 DIAGNOSIS — H21.569: ICD-10-CM

## 2024-09-19 PROCEDURE — 92202 OPSCPY EXTND ON/MAC DRAW: CPT | Mod: NC

## 2024-09-19 PROCEDURE — 92014 COMPRE OPH EXAM EST PT 1/>: CPT | Mod: 25

## 2024-09-19 PROCEDURE — 92134 CPTRZ OPH DX IMG PST SGM RTA: CPT

## 2024-09-19 PROCEDURE — 67028 INJECTION EYE DRUG: CPT | Mod: 50

## 2024-09-19 ASSESSMENT — VISUAL ACUITY
OS_CC: 20/30+2
OD_CC: 20/40-2

## 2024-09-19 ASSESSMENT — TONOMETRY
OD_IOP_MMHG: 22
OS_IOP_MMHG: 15

## 2024-12-05 ENCOUNTER — FOLLOW UP (OUTPATIENT)
Dept: URBAN - METROPOLITAN AREA CLINIC 87 | Facility: CLINIC | Age: 44
End: 2024-12-05

## 2024-12-05 DIAGNOSIS — H35.033: ICD-10-CM

## 2024-12-05 DIAGNOSIS — H25.13: ICD-10-CM

## 2024-12-05 DIAGNOSIS — E11.3531: ICD-10-CM

## 2024-12-05 DIAGNOSIS — E11.3513: ICD-10-CM

## 2024-12-05 DIAGNOSIS — H43.11: ICD-10-CM

## 2024-12-05 DIAGNOSIS — H21.569: ICD-10-CM

## 2024-12-05 DIAGNOSIS — H43.13: ICD-10-CM

## 2024-12-05 DIAGNOSIS — Z79.4: ICD-10-CM

## 2024-12-05 PROCEDURE — 92014 COMPRE OPH EXAM EST PT 1/>: CPT | Mod: 25

## 2024-12-05 PROCEDURE — C9257 BEVACIZUMAB INJECTION: HCPCS | Mod: JZ

## 2024-12-05 PROCEDURE — 92202 OPSCPY EXTND ON/MAC DRAW: CPT

## 2024-12-05 PROCEDURE — 92134 CPTRZ OPH DX IMG PST SGM RTA: CPT | Mod: NC

## 2024-12-05 PROCEDURE — 67028 INJECTION EYE DRUG: CPT | Mod: 50

## 2024-12-05 PROCEDURE — A AVASTIN: Mod: JZ

## 2024-12-05 ASSESSMENT — TONOMETRY
OD_IOP_MMHG: 21
OS_IOP_MMHG: 21

## 2024-12-05 ASSESSMENT — VISUAL ACUITY
OD_CC: 20/30-1
OS_CC: 20/25

## 2025-02-27 ENCOUNTER — FOLLOW UP (OUTPATIENT)
Age: 45
End: 2025-02-27

## 2025-02-27 DIAGNOSIS — H43.13: ICD-10-CM

## 2025-02-27 DIAGNOSIS — H35.033: ICD-10-CM

## 2025-02-27 DIAGNOSIS — E11.3513: ICD-10-CM

## 2025-02-27 DIAGNOSIS — E11.3531: ICD-10-CM

## 2025-02-27 DIAGNOSIS — H43.11: ICD-10-CM

## 2025-02-27 DIAGNOSIS — H21.569: ICD-10-CM

## 2025-02-27 DIAGNOSIS — Z79.4: ICD-10-CM

## 2025-02-27 DIAGNOSIS — H25.13: ICD-10-CM

## 2025-02-27 PROCEDURE — 67028 INJECTION EYE DRUG: CPT | Mod: 50

## 2025-02-27 PROCEDURE — 92014 COMPRE OPH EXAM EST PT 1/>: CPT

## 2025-02-27 PROCEDURE — 92202 OPSCPY EXTND ON/MAC DRAW: CPT

## 2025-02-27 PROCEDURE — 92134 CPTRZ OPH DX IMG PST SGM RTA: CPT

## 2025-02-27 ASSESSMENT — VISUAL ACUITY
OD_CC: 20/30
OS_CC: 20/25-2

## 2025-02-27 ASSESSMENT — TONOMETRY
OS_IOP_MMHG: 19
OD_IOP_MMHG: 22

## 2025-03-06 ENCOUNTER — PROBLEM (OUTPATIENT)
Dept: URBAN - METROPOLITAN AREA CLINIC 87 | Age: 45
End: 2025-03-06

## 2025-03-06 DIAGNOSIS — H43.13: ICD-10-CM

## 2025-03-06 DIAGNOSIS — E11.3513: ICD-10-CM

## 2025-03-06 DIAGNOSIS — Z79.4: ICD-10-CM

## 2025-03-06 DIAGNOSIS — H25.13: ICD-10-CM

## 2025-03-06 DIAGNOSIS — H15.012: ICD-10-CM

## 2025-03-06 DIAGNOSIS — H35.033: ICD-10-CM

## 2025-03-06 DIAGNOSIS — H21.569: ICD-10-CM

## 2025-03-06 PROCEDURE — 92012 INTRM OPH EXAM EST PATIENT: CPT

## 2025-03-06 PROCEDURE — 92202 OPSCPY EXTND ON/MAC DRAW: CPT

## 2025-03-06 PROCEDURE — 92134 CPTRZ OPH DX IMG PST SGM RTA: CPT

## 2025-03-06 ASSESSMENT — TONOMETRY
OS_IOP_MMHG: 23
OD_IOP_MMHG: 23

## 2025-03-06 ASSESSMENT — VISUAL ACUITY
OS_CC: 20/30
OD_CC: 20/40-2

## 2025-06-05 ENCOUNTER — FOLLOW UP (OUTPATIENT)
Dept: URBAN - METROPOLITAN AREA CLINIC 87 | Age: 45
End: 2025-06-05

## 2025-06-05 DIAGNOSIS — E11.3513: ICD-10-CM

## 2025-06-05 DIAGNOSIS — Z79.4: ICD-10-CM

## 2025-06-05 DIAGNOSIS — H25.13: ICD-10-CM

## 2025-06-05 DIAGNOSIS — H35.033: ICD-10-CM

## 2025-06-05 DIAGNOSIS — H43.13: ICD-10-CM

## 2025-06-05 DIAGNOSIS — H43.11: ICD-10-CM

## 2025-06-05 DIAGNOSIS — H21.569: ICD-10-CM

## 2025-06-05 DIAGNOSIS — H15.012: ICD-10-CM

## 2025-06-05 DIAGNOSIS — E11.3531: ICD-10-CM

## 2025-06-05 PROCEDURE — 92134 CPTRZ OPH DX IMG PST SGM RTA: CPT

## 2025-06-05 PROCEDURE — 67028 INJECTION EYE DRUG: CPT | Mod: 50

## 2025-06-05 PROCEDURE — 92014 COMPRE OPH EXAM EST PT 1/>: CPT | Mod: 25

## 2025-06-05 ASSESSMENT — TONOMETRY
OS_IOP_MMHG: 19
OD_IOP_MMHG: 20

## 2025-06-05 ASSESSMENT — VISUAL ACUITY
OS_CC: 20/25+2
OD_CC: 20/25-1

## 2025-08-28 ENCOUNTER — FOLLOW UP (OUTPATIENT)
Dept: URBAN - METROPOLITAN AREA CLINIC 87 | Age: 45
End: 2025-08-28

## 2025-08-28 DIAGNOSIS — H43.13: ICD-10-CM

## 2025-08-28 DIAGNOSIS — H35.033: ICD-10-CM

## 2025-08-28 DIAGNOSIS — E11.3531: ICD-10-CM

## 2025-08-28 DIAGNOSIS — H25.13: ICD-10-CM

## 2025-08-28 DIAGNOSIS — E11.3513: ICD-10-CM

## 2025-08-28 DIAGNOSIS — H15.012: ICD-10-CM

## 2025-08-28 DIAGNOSIS — H21.569: ICD-10-CM

## 2025-08-28 DIAGNOSIS — H43.11: ICD-10-CM

## 2025-08-28 DIAGNOSIS — Z79.4: ICD-10-CM

## 2025-08-28 PROCEDURE — 67028 INJECTION EYE DRUG: CPT | Mod: 50

## 2025-08-28 PROCEDURE — 92134 CPTRZ OPH DX IMG PST SGM RTA: CPT

## 2025-08-28 PROCEDURE — 92014 COMPRE OPH EXAM EST PT 1/>: CPT | Mod: 25

## 2025-08-28 PROCEDURE — 92201 OPSCPY EXTND RTA DRAW UNI/BI: CPT | Mod: NC

## 2025-08-28 ASSESSMENT — VISUAL ACUITY
OS_CC: 20/30+2
OD_CC: 20/30-1

## 2025-08-28 ASSESSMENT — TONOMETRY
OD_IOP_MMHG: 21
OS_IOP_MMHG: 20